# Patient Record
Sex: MALE | Race: WHITE | URBAN - METROPOLITAN AREA
[De-identification: names, ages, dates, MRNs, and addresses within clinical notes are randomized per-mention and may not be internally consistent; named-entity substitution may affect disease eponyms.]

---

## 2017-01-01 ENCOUNTER — MEDICAL CORRESPONDENCE (OUTPATIENT)
Dept: HEALTH INFORMATION MANAGEMENT | Facility: CLINIC | Age: 82
End: 2017-01-01

## 2017-01-01 ENCOUNTER — NURSE TRIAGE (OUTPATIENT)
Dept: NURSING | Facility: CLINIC | Age: 82
End: 2017-01-01

## 2017-01-01 ENCOUNTER — TRANSFERRED RECORDS (OUTPATIENT)
Dept: HEALTH INFORMATION MANAGEMENT | Facility: CLINIC | Age: 82
End: 2017-01-01

## 2017-01-01 ENCOUNTER — TELEPHONE (OUTPATIENT)
Dept: FAMILY MEDICINE | Facility: CLINIC | Age: 82
End: 2017-01-01

## 2017-01-01 DIAGNOSIS — I10 BENIGN ESSENTIAL HYPERTENSION: ICD-10-CM

## 2017-01-01 LAB
ALT SERPL-CCNC: 40 U/L (ref 0–45)
AST SERPL-CCNC: 34 U/L (ref 0–40)
CREAT SERPL-MCNC: 1.92 MG/DL (ref 0.7–1.3)
GFR SERPL CREATININE-BSD FRML MDRD: 33 ML/MIN/1.73M2
GLUCOSE SERPL-MCNC: 136 MG/DL (ref 70–125)
INR PPP: 1.29 (ref 0.9–1.1)
POTASSIUM SERPL-SCNC: 5.7 MMOL/L (ref 3.5–5)

## 2017-01-01 RX ORDER — AMLODIPINE BESYLATE 5 MG/1
TABLET ORAL
Qty: 30 TABLET | Refills: 0 | Status: SHIPPED | OUTPATIENT
Start: 2017-01-01 | End: 2018-01-01

## 2017-01-01 RX ORDER — LISINOPRIL AND HYDROCHLOROTHIAZIDE 20; 25 MG/1; MG/1
TABLET ORAL
Qty: 30 TABLET | Refills: 0 | Status: SHIPPED | OUTPATIENT
Start: 2017-01-01 | End: 2018-01-01

## 2017-02-14 DIAGNOSIS — E78.5 HYPERLIPIDEMIA LDL GOAL <100: ICD-10-CM

## 2017-02-14 RX ORDER — SIMVASTATIN 40 MG
TABLET ORAL
Qty: 90 TABLET | Refills: 3 | Status: SHIPPED | OUTPATIENT
Start: 2017-02-14 | End: 2018-01-01

## 2017-11-25 NOTE — TELEPHONE ENCOUNTER
"Danisha Aguila who identifies herself as Patient's significant other calling to inform patient is in [facility] in [place], [place] with a stoke and receiving \"clot busting drugs\".  Asking if medical providers require any medical information, how they may obtain it.  FNA advised to call clinic and will be directed to FNA.  "

## 2017-11-25 NOTE — TELEPHONE ENCOUNTER
Clinic Action Needed:No  Reason for Call: Isiah Hooker MD with [place] [place] [facility] calling to verify current medication list and inquiring about code status or advance directives.  Advised of current medication list in Epic, and dosages, verified no code status or advance directive on file per last office visit in December of 2016.  Isiah in ER in [place], [place] for care.   Routed to: Not routed.    Norma Chávez RN  [facility]

## 2017-11-28 NOTE — TELEPHONE ENCOUNTER
Daughter Alexandra Petty calling back, Please call Alexandra back at # [phone]  Ok to leave a detailed message    Thank You

## 2017-11-28 NOTE — TELEPHONE ENCOUNTER
"Spent 18 minutes listening to relayed story from significant other of 30 years.  Unable to give information as no Consent.  Pt has had stroke, is in [place], and [place] be discharged [place] a [place]cu in a few days. SO wondering if pt can be transferred to [place], in [place] they are saying pt cannot.  Daughter is decision maker who is in [place], not significant other. Daughter may be calling clinic later today to discuss. Also advised could have current hospital fax update to PCP.    \"We were away for holiday visiting family. [place] [place], I realized that Jayden just had a stroke.  He went to give me a bottle of water, and he couldn't say anything.  I looked and realized a side of his face had dropped.  Then when he went to drink the water it was spilling all over him. I got off of highway, and 3 blocks down fire station.  Immediately called for ambulance, and put him in ambulance. Immediately to [facility].  Was in ER at [facility] on Sunday the 19th  for ASHLEY abiley.  Went out of town Tuesday, and was waking up over night could not catch breath.  Went to  in [facility], was given medication help sleep, thought was panic attack.  Had stroke on Sat Afternoon.  Will stay at hospital through end of week, then rehab in [place].\"    Daughter Alexandra is decision maker.   Advised to call back to clinic if further questions, but arrangements and judgement on if pt can fly should be made by Hospitalist/hospital  in [place].  Diana Castillo RN    "

## 2017-11-28 NOTE — TELEPHONE ENCOUNTER
Reason for Call:  LINCOLN    Detailed comments: Patient's Significant other Mingo (Xuan) Anitha would like to talk to   due to Jayden had a Stroke in [place] on the [place] and is at a Hospital in  [place] right now, she wants to update  on want happened and what is going on    Phone Number Patient can be reached at: [phone] (or) [phone] (Mingo) Significant other    Best Time: anytime    Can we leave a detailed message on this number? YES    Call taken on 11/28/2017 at 8:14 AM by Fidel Ramirez

## 2017-11-28 NOTE — TELEPHONE ENCOUNTER
Did huddle with PCP on this.  If pt is ok to come back to [facility], office should help facilitate facility     However called daughter Alexandra back.  Daughter states everything is under control in [place], that the Hospitalist is handling things and they will be transitioning to TCU in [facility].  Daughter will call if wanting coordination with moving pt back to [place], and will have facility fax discharge paperwork if needed.    No further action needed in clinic at this point.  Diana Castillo RN

## 2017-11-29 NOTE — TELEPHONE ENCOUNTER
Medication is being filled for 1 time refill only due to:  Patient needs to be seen because due for physical and labs December 2017.   Debbie MORFIN RN    Requested Prescriptions   Pending Prescriptions Disp Refills     lisinopril-hydrochlorothiazide (PRINZIDE/ZESTORETIC) 20-25 MG per tablet [Pharmacy Med Name: LISINOPRIL-HCTZ 20-25 MG TAB] 90 tablet 3     Sig: TAKE 1 TABLET BY MOUTH EVERY DAY    Diuretics (Including Combos) Protocol Failed    11/29/2017 11:24 AM       Failed - Blood pressure under 140/90    BP Readings from Last 3 Encounters:   12/06/16 156/72   10/09/15 126/80   08/05/14 136/74                Failed - Normal serum creatinine on file in past 12 months    Recent Labs   Lab Test  12/06/16   0936   CR  2.03*             Passed - Recent or future visit with authorizing provider's specialty    Patient had office visit in the last year or has a visit in the next 30 days with authorizing provider.  See chart review.              Passed - Patient is age 18 or older       Passed - Normal serum potassium on file in past 12 months    Recent Labs   Lab Test  12/06/16   0936   POTASSIUM  4.8                   Passed - Normal serum sodium on file in past 12 months    Recent Labs   Lab Test  12/06/16   0936   NA  142

## 2017-11-29 NOTE — TELEPHONE ENCOUNTER
Left message asking Alexandra to call back to clarify what is needed/requested per below message  Janice NORRIS RN

## 2017-11-29 NOTE — TELEPHONE ENCOUNTER
Pt's daughter Alexandra called back. She had a question about one of pt's medications. It was difficult to hear her because there was a lot of background noise and side conversations between her and someone named Maliha, but what I gathered is that pt is out of one of his medications, she's not sure which one, but she wonders if it is best that Dr. Zaldivar send script to her pharmacy in [place], or if Hospital Doctor should prescribe. Please call Alexandra back for clarification asap. Ph # is [phone]

## 2017-12-01 NOTE — TELEPHONE ENCOUNTER
I reached her and they are going to fax Dr Zaldivar when he is discharged.  Nothing needed now  Faby Elmore RN- Triage [employer]

## 2017-12-08 NOTE — TELEPHONE ENCOUNTER
Reason for Call:  Other call back    Detailed comments: Pt needs rehab and lives in [facility]-- Pt's home care nurse is wondering if Kayley can follow up on pt with fax and phone due to the pt recently having a stroke. Please give pt's home care nurse a call back in regards to this.    Phone Number Patient can be reached at: Other phone number:  [facility]: [facility]-- [phone]    Best Time:     Can we leave a detailed message on this number? YES    Call taken on 12/8/2017 at 10:02 AM by Sarah Villar

## 2017-12-08 NOTE — TELEPHONE ENCOUNTER
Called [facility] back and they got one of the in house doctors to follow Jayden, but they will let him know that he needs an OV soon.   Savana Neal MA

## 2017-12-22 NOTE — TELEPHONE ENCOUNTER
I can not today, could do hosp follow up spot on the 26th.  However, if he is at [facility] then I suspect is seeing nursing home md.  I usually have that person care for person in [facility] and when out then see patient.    Azar Zaldivar M.D.

## 2017-12-22 NOTE — TELEPHONE ENCOUNTER
TO PCP:   Called patient - son Roberto (not on C2C) answered (unable to discuss reason for call)  Roberto states patient recently had a stroke and is currently at [facility] and he wants to bring pt in for OV to see Dr. Zaldivar but he is only in town through 12/26/17 and is asking if pt can either be fit into the schedule this afternoon or sometime on 12/26/17?   Roberto's cell number is: [phone] - he would like a call back     Also RN unable to send Rx as 1) patient overdue for OV/labs 2) last BP elevated 3) last creatinine elevated   Amlodipine 30 day Rx is pended     Janice NORRIS RN            Requested Prescriptions   Pending Prescriptions Disp Refills     amLODIPine (NORVASC) 5 MG tablet [Pharmacy Med Name: AMLODIPINE BESYLATE 5 MG TAB] 90 tablet 3     Sig: TAKE 1 TABLET BY MOUTH EVERY DAY    Calcium Channel Blockers Protocol  Failed    12/21/2017 10:01 AM       Failed - Blood pressure under 140/90    BP Readings from Last 3 Encounters:   12/06/16 156/72   10/09/15 126/80   08/05/14 136/74          Failed - Recent or future visit with authorizing provider    Patient had office visit in the last year or has a visit in the next 30 days with authorizing provider.  See chart review.          Failed - Normal serum creatinine on file in past 12 months    Recent Labs   Lab Test  12/06/16   0936   CR  2.03*            Passed - Patient is age 18 or older        Last OV 12/6/16

## 2017-12-22 NOTE — TELEPHONE ENCOUNTER
Spoke with Roberto - scheduled patient for 12/26/17 with PCP - still wants to see PCP even though currently at [facility]   Pt is receiving medication at [facility] currently - can discuss medication at upcoming OV     30 day Rx is pended   Routing refill request to provider for review/approval because:  Labs not current/out of range:  Last creatinine elevated   Last BP elevated       Janice NORRIS RN

## 2018-01-01 ENCOUNTER — ANTICOAGULATION THERAPY VISIT (OUTPATIENT)
Dept: NURSING | Facility: CLINIC | Age: 83
End: 2018-01-01
Payer: COMMERCIAL

## 2018-01-01 ENCOUNTER — MEDICAL CORRESPONDENCE (OUTPATIENT)
Dept: HEALTH INFORMATION MANAGEMENT | Facility: CLINIC | Age: 83
End: 2018-01-01

## 2018-01-01 ENCOUNTER — NURSE TRIAGE (OUTPATIENT)
Dept: NURSING | Facility: CLINIC | Age: 83
End: 2018-01-01

## 2018-01-01 ENCOUNTER — APPOINTMENT (OUTPATIENT)
Dept: ULTRASOUND IMAGING | Facility: CLINIC | Age: 83
DRG: 091 | End: 2018-01-01
Attending: HOSPITALIST
Payer: MEDICARE

## 2018-01-01 ENCOUNTER — APPOINTMENT (OUTPATIENT)
Dept: GENERAL RADIOLOGY | Facility: CLINIC | Age: 83
DRG: 091 | End: 2018-01-01
Attending: NURSE PRACTITIONER
Payer: MEDICARE

## 2018-01-01 ENCOUNTER — TELEPHONE (OUTPATIENT)
Dept: FAMILY MEDICINE | Facility: CLINIC | Age: 83
End: 2018-01-01

## 2018-01-01 ENCOUNTER — TRANSFERRED RECORDS (OUTPATIENT)
Dept: HEALTH INFORMATION MANAGEMENT | Facility: CLINIC | Age: 83
End: 2018-01-01

## 2018-01-01 ENCOUNTER — DOCUMENTATION ONLY (OUTPATIENT)
Dept: CARDIOLOGY | Facility: CLINIC | Age: 83
End: 2018-01-01

## 2018-01-01 ENCOUNTER — RECORDS - HEALTHEAST (OUTPATIENT)
Dept: LAB | Facility: CLINIC | Age: 83
End: 2018-01-01

## 2018-01-01 ENCOUNTER — DOCUMENTATION ONLY (OUTPATIENT)
Dept: SLEEP MEDICINE | Facility: CLINIC | Age: 83
End: 2018-01-01
Payer: COMMERCIAL

## 2018-01-01 ENCOUNTER — APPOINTMENT (OUTPATIENT)
Dept: NUCLEAR MEDICINE | Facility: CLINIC | Age: 83
DRG: 091 | End: 2018-01-01
Attending: INTERNAL MEDICINE
Payer: MEDICARE

## 2018-01-01 ENCOUNTER — DOCUMENTATION ONLY (OUTPATIENT)
Dept: FAMILY MEDICINE | Facility: CLINIC | Age: 83
End: 2018-01-01

## 2018-01-01 ENCOUNTER — OFFICE VISIT (OUTPATIENT)
Dept: FAMILY MEDICINE | Facility: CLINIC | Age: 83
End: 2018-01-01
Payer: COMMERCIAL

## 2018-01-01 ENCOUNTER — TELEPHONE (OUTPATIENT)
Dept: CARDIOLOGY | Facility: CLINIC | Age: 83
End: 2018-01-01

## 2018-01-01 ENCOUNTER — ANTICOAGULATION THERAPY VISIT (OUTPATIENT)
Dept: FAMILY MEDICINE | Facility: CLINIC | Age: 83
End: 2018-01-01
Payer: COMMERCIAL

## 2018-01-01 ENCOUNTER — HOSPITAL ENCOUNTER (INPATIENT)
Facility: CLINIC | Age: 83
LOS: 5 days | Discharge: NURSING, REHAB OR LONG-TERM CARE | DRG: 091 | End: 2018-06-18
Attending: INTERNAL MEDICINE | Admitting: INTERNAL MEDICINE
Payer: MEDICARE

## 2018-01-01 ENCOUNTER — ANTICOAGULATION THERAPY VISIT (OUTPATIENT)
Dept: FAMILY MEDICINE | Facility: CLINIC | Age: 83
End: 2018-01-01

## 2018-01-01 ENCOUNTER — APPOINTMENT (OUTPATIENT)
Dept: PHYSICAL THERAPY | Facility: CLINIC | Age: 83
DRG: 091 | End: 2018-01-01
Attending: PHYSICIAN ASSISTANT
Payer: MEDICARE

## 2018-01-01 ENCOUNTER — APPOINTMENT (OUTPATIENT)
Dept: CARDIOLOGY | Facility: CLINIC | Age: 83
DRG: 091 | End: 2018-01-01
Attending: PHYSICIAN ASSISTANT
Payer: MEDICARE

## 2018-01-01 ENCOUNTER — OFFICE VISIT (OUTPATIENT)
Dept: SLEEP MEDICINE | Facility: CLINIC | Age: 83
End: 2018-01-01
Payer: COMMERCIAL

## 2018-01-01 ENCOUNTER — APPOINTMENT (OUTPATIENT)
Dept: MRI IMAGING | Facility: CLINIC | Age: 83
DRG: 091 | End: 2018-01-01
Attending: NURSE PRACTITIONER
Payer: MEDICARE

## 2018-01-01 ENCOUNTER — HOSPITAL ENCOUNTER (OUTPATIENT)
Dept: CARDIOLOGY | Facility: CLINIC | Age: 83
Discharge: HOME | End: 2018-02-06
Attending: INTERNAL MEDICINE | Admitting: INTERNAL MEDICINE
Payer: MEDICARE

## 2018-01-01 ENCOUNTER — HOSPITAL ENCOUNTER (EMERGENCY)
Facility: CLINIC | Age: 83
Discharge: HOME | End: 2018-06-03
Attending: EMERGENCY MEDICINE | Admitting: EMERGENCY MEDICINE
Payer: MEDICARE

## 2018-01-01 ENCOUNTER — APPOINTMENT (OUTPATIENT)
Dept: PHYSICAL THERAPY | Facility: CLINIC | Age: 83
DRG: 091 | End: 2018-01-01
Attending: INTERNAL MEDICINE
Payer: MEDICARE

## 2018-01-01 ENCOUNTER — TELEPHONE (OUTPATIENT)
Dept: SLEEP MEDICINE | Facility: CLINIC | Age: 83
End: 2018-01-01

## 2018-01-01 ENCOUNTER — APPOINTMENT (OUTPATIENT)
Dept: CT IMAGING | Facility: CLINIC | Age: 83
DRG: 091 | End: 2018-01-01
Attending: PHYSICIAN ASSISTANT
Payer: MEDICARE

## 2018-01-01 ENCOUNTER — HOSPITAL ENCOUNTER (OUTPATIENT)
Dept: CT IMAGING | Facility: CLINIC | Age: 83
Discharge: HOME | End: 2018-05-18
Attending: NURSE PRACTITIONER | Admitting: NURSE PRACTITIONER
Payer: MEDICARE

## 2018-01-01 ENCOUNTER — ANTICOAGULATION THERAPY VISIT (OUTPATIENT)
Dept: NURSING | Facility: CLINIC | Age: 83
End: 2018-01-01

## 2018-01-01 ENCOUNTER — APPOINTMENT (OUTPATIENT)
Dept: CT IMAGING | Facility: CLINIC | Age: 83
DRG: 091 | End: 2018-01-01
Attending: NURSE PRACTITIONER
Payer: MEDICARE

## 2018-01-01 ENCOUNTER — APPOINTMENT (OUTPATIENT)
Dept: CT IMAGING | Facility: CLINIC | Age: 83
End: 2018-01-01
Attending: EMERGENCY MEDICINE
Payer: MEDICARE

## 2018-01-01 ENCOUNTER — OFFICE VISIT (OUTPATIENT)
Dept: CARDIOLOGY | Facility: CLINIC | Age: 83
End: 2018-01-01
Payer: COMMERCIAL

## 2018-01-01 VITALS
WEIGHT: 125.5 LBS | TEMPERATURE: 97.6 F | OXYGEN SATURATION: 95 % | BODY MASS INDEX: 18.59 KG/M2 | HEART RATE: 69 BPM | DIASTOLIC BLOOD PRESSURE: 68 MMHG | HEIGHT: 69 IN | SYSTOLIC BLOOD PRESSURE: 128 MMHG

## 2018-01-01 VITALS
HEIGHT: 69 IN | TEMPERATURE: 97.6 F | DIASTOLIC BLOOD PRESSURE: 86 MMHG | OXYGEN SATURATION: 100 % | SYSTOLIC BLOOD PRESSURE: 160 MMHG | HEART RATE: 81 BPM

## 2018-01-01 VITALS
BODY MASS INDEX: 18.51 KG/M2 | WEIGHT: 125 LBS | HEIGHT: 69 IN | SYSTOLIC BLOOD PRESSURE: 134 MMHG | DIASTOLIC BLOOD PRESSURE: 68 MMHG | HEART RATE: 61 BPM | OXYGEN SATURATION: 92 %

## 2018-01-01 VITALS
DIASTOLIC BLOOD PRESSURE: 76 MMHG | SYSTOLIC BLOOD PRESSURE: 118 MMHG | HEIGHT: 69 IN | TEMPERATURE: 97.4 F | BODY MASS INDEX: 22.07 KG/M2 | OXYGEN SATURATION: 94 % | WEIGHT: 149 LBS | HEART RATE: 96 BPM

## 2018-01-01 VITALS
WEIGHT: 122 LBS | BODY MASS INDEX: 18.07 KG/M2 | HEIGHT: 69 IN | TEMPERATURE: 97.8 F | SYSTOLIC BLOOD PRESSURE: 108 MMHG | HEART RATE: 65 BPM | DIASTOLIC BLOOD PRESSURE: 62 MMHG | OXYGEN SATURATION: 95 %

## 2018-01-01 VITALS
DIASTOLIC BLOOD PRESSURE: 65 MMHG | HEIGHT: 69 IN | TEMPERATURE: 97.4 F | HEART RATE: 68 BPM | BODY MASS INDEX: 18.96 KG/M2 | SYSTOLIC BLOOD PRESSURE: 126 MMHG | WEIGHT: 128 LBS | OXYGEN SATURATION: 100 %

## 2018-01-01 VITALS
HEART RATE: 56 BPM | HEIGHT: 69 IN | BODY MASS INDEX: 18.51 KG/M2 | RESPIRATION RATE: 16 BRPM | WEIGHT: 125 LBS | SYSTOLIC BLOOD PRESSURE: 136 MMHG | DIASTOLIC BLOOD PRESSURE: 62 MMHG | OXYGEN SATURATION: 95 %

## 2018-01-01 VITALS
BODY MASS INDEX: 18.81 KG/M2 | TEMPERATURE: 97.7 F | RESPIRATION RATE: 20 BRPM | OXYGEN SATURATION: 97 % | SYSTOLIC BLOOD PRESSURE: 146 MMHG | HEIGHT: 69 IN | WEIGHT: 127 LBS | DIASTOLIC BLOOD PRESSURE: 86 MMHG

## 2018-01-01 VITALS
DIASTOLIC BLOOD PRESSURE: 76 MMHG | HEART RATE: 64 BPM | BODY MASS INDEX: 17.96 KG/M2 | TEMPERATURE: 97.3 F | OXYGEN SATURATION: 93 % | WEIGHT: 121.6 LBS | SYSTOLIC BLOOD PRESSURE: 154 MMHG | RESPIRATION RATE: 16 BRPM

## 2018-01-01 VITALS
OXYGEN SATURATION: 100 % | HEART RATE: 62 BPM | HEIGHT: 69 IN | WEIGHT: 127.9 LBS | DIASTOLIC BLOOD PRESSURE: 63 MMHG | SYSTOLIC BLOOD PRESSURE: 127 MMHG | BODY MASS INDEX: 18.94 KG/M2 | TEMPERATURE: 96.5 F

## 2018-01-01 VITALS
BODY MASS INDEX: 18.51 KG/M2 | HEIGHT: 69 IN | WEIGHT: 125 LBS | SYSTOLIC BLOOD PRESSURE: 129 MMHG | HEART RATE: 62 BPM | DIASTOLIC BLOOD PRESSURE: 64 MMHG | RESPIRATION RATE: 16 BRPM | OXYGEN SATURATION: 92 %

## 2018-01-01 VITALS
HEIGHT: 69 IN | WEIGHT: 133 LBS | HEART RATE: 81 BPM | BODY MASS INDEX: 19.7 KG/M2 | TEMPERATURE: 97.9 F | OXYGEN SATURATION: 100 % | SYSTOLIC BLOOD PRESSURE: 126 MMHG | DIASTOLIC BLOOD PRESSURE: 58 MMHG

## 2018-01-01 DIAGNOSIS — I63.419 CEREBROVASCULAR ACCIDENT (CVA) DUE TO EMBOLISM OF MIDDLE CEREBRAL ARTERY, UNSPECIFIED BLOOD VESSEL LATERALITY (H): ICD-10-CM

## 2018-01-01 DIAGNOSIS — R29.6 RECURRENT FALLS: Primary | ICD-10-CM

## 2018-01-01 DIAGNOSIS — I73.9 PVD (PERIPHERAL VASCULAR DISEASE) WITH CLAUDICATION (H): ICD-10-CM

## 2018-01-01 DIAGNOSIS — Z79.01 LONG-TERM (CURRENT) USE OF ANTICOAGULANTS: ICD-10-CM

## 2018-01-01 DIAGNOSIS — I25.10 ASCVD (ARTERIOSCLEROTIC CARDIOVASCULAR DISEASE): ICD-10-CM

## 2018-01-01 DIAGNOSIS — I48.0 INTERMITTENT ATRIAL FIBRILLATION (H): Primary | ICD-10-CM

## 2018-01-01 DIAGNOSIS — N18.4 CKD (CHRONIC KIDNEY DISEASE) STAGE 4, GFR 15-29 ML/MIN (H): Primary | ICD-10-CM

## 2018-01-01 DIAGNOSIS — I10 BENIGN ESSENTIAL HYPERTENSION: ICD-10-CM

## 2018-01-01 DIAGNOSIS — I42.0 DILATED CARDIOMYOPATHY (H): ICD-10-CM

## 2018-01-01 DIAGNOSIS — I63.9 CEREBROVASCULAR ACCIDENT (CVA), UNSPECIFIED MECHANISM (H): ICD-10-CM

## 2018-01-01 DIAGNOSIS — Z87.820 SIGNIFICANT CLOSED HEAD TRAUMA WITHIN PAST 3 MONTHS: ICD-10-CM

## 2018-01-01 DIAGNOSIS — N18.4 CKD (CHRONIC KIDNEY DISEASE) STAGE 4, GFR 15-29 ML/MIN (H): ICD-10-CM

## 2018-01-01 DIAGNOSIS — Z71.89 ADVANCED DIRECTIVES, COUNSELING/DISCUSSION: Primary | ICD-10-CM

## 2018-01-01 DIAGNOSIS — I63.9 CEREBROVASCULAR ACCIDENT (CVA), UNSPECIFIED MECHANISM (H): Primary | ICD-10-CM

## 2018-01-01 DIAGNOSIS — I48.0 INTERMITTENT ATRIAL FIBRILLATION (H): ICD-10-CM

## 2018-01-01 DIAGNOSIS — I25.5 ISCHEMIC CARDIOMYOPATHY: ICD-10-CM

## 2018-01-01 DIAGNOSIS — R53.83 OTHER FATIGUE: ICD-10-CM

## 2018-01-01 DIAGNOSIS — R29.6 FALLS FREQUENTLY: Primary | ICD-10-CM

## 2018-01-01 DIAGNOSIS — F41.9 ANXIETY: ICD-10-CM

## 2018-01-01 DIAGNOSIS — G47.20 CIRCADIAN RHYTHM SLEEP DISORDER: Primary | ICD-10-CM

## 2018-01-01 DIAGNOSIS — G47.9 DIFFICULTY SLEEPING: ICD-10-CM

## 2018-01-01 DIAGNOSIS — R60.0 BILATERAL LEG EDEMA: ICD-10-CM

## 2018-01-01 DIAGNOSIS — S09.90XA ACUTE HEAD TRAUMA, INITIAL ENCOUNTER: Primary | ICD-10-CM

## 2018-01-01 DIAGNOSIS — I42.9 CARDIOMYOPATHY, UNSPECIFIED TYPE (H): ICD-10-CM

## 2018-01-01 DIAGNOSIS — R55 SYNCOPE, UNSPECIFIED SYNCOPE TYPE: Primary | ICD-10-CM

## 2018-01-01 DIAGNOSIS — R06.3 CHEYNE-STOKES BREATHING DISORDER: ICD-10-CM

## 2018-01-01 DIAGNOSIS — G47.419 NARCOLEPSY WITHOUT CATAPLEXY(347.00): ICD-10-CM

## 2018-01-01 DIAGNOSIS — S09.90XA ACUTE HEAD TRAUMA, INITIAL ENCOUNTER: ICD-10-CM

## 2018-01-01 DIAGNOSIS — R63.4 LOSS OF WEIGHT: Primary | ICD-10-CM

## 2018-01-01 DIAGNOSIS — G47.19 EXCESSIVE DAYTIME SLEEPINESS: ICD-10-CM

## 2018-01-01 DIAGNOSIS — E78.5 HYPERLIPIDEMIA LDL GOAL <100: ICD-10-CM

## 2018-01-01 DIAGNOSIS — R74.8 ELEVATED LIVER ENZYMES: ICD-10-CM

## 2018-01-01 DIAGNOSIS — F51.04 CHRONIC INSOMNIA: ICD-10-CM

## 2018-01-01 DIAGNOSIS — L02.436: ICD-10-CM

## 2018-01-01 DIAGNOSIS — R55 SYNCOPE, UNSPECIFIED SYNCOPE TYPE: ICD-10-CM
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
## 2018-01-01 DIAGNOSIS — E46 PROTEIN-CALORIE MALNUTRITION, UNSPECIFIED SEVERITY (H): ICD-10-CM

## 2018-01-01 DIAGNOSIS — Z79.01 CURRENT USE OF LONG TERM ANTICOAGULATION: ICD-10-CM

## 2018-01-01 DIAGNOSIS — I48.21 PERMANENT ATRIAL FIBRILLATION (H): ICD-10-CM

## 2018-01-01 DIAGNOSIS — I25.10 CORONARY ARTERY DISEASE INVOLVING NATIVE CORONARY ARTERY OF NATIVE HEART WITHOUT ANGINA PECTORIS: ICD-10-CM

## 2018-01-01 DIAGNOSIS — R41.89 COGNITIVE IMPAIRMENT: ICD-10-CM

## 2018-01-01 DIAGNOSIS — I63.419 CEREBROVASCULAR ACCIDENT (CVA) DUE TO EMBOLISM OF MIDDLE CEREBRAL ARTERY, UNSPECIFIED BLOOD VESSEL LATERALITY (H): Primary | ICD-10-CM

## 2018-01-01 DIAGNOSIS — G47.31 CENTRAL SLEEP APNEA: Primary | ICD-10-CM

## 2018-01-01 DIAGNOSIS — S00.03XA CONTUSION OF SCALP, INITIAL ENCOUNTER: ICD-10-CM

## 2018-01-01 DIAGNOSIS — I10 BENIGN ESSENTIAL HYPERTENSION: Primary | ICD-10-CM

## 2018-01-01 LAB
ALBUMIN SERPL-MCNC: 2.4 G/DL (ref 3.4–5)
ALBUMIN UR-MCNC: 100 MG/DL
ALP SERPL-CCNC: 227 U/L (ref 40–150)
ALT SERPL W P-5'-P-CCNC: 25 U/L (ref 0–70)
ANION GAP SERPL CALCULATED.3IONS-SCNC: 10 MMOL/L (ref 3–14)
ANION GAP SERPL CALCULATED.3IONS-SCNC: 5 MMOL/L (ref 3–14)
ANION GAP SERPL CALCULATED.3IONS-SCNC: 7 MMOL/L (ref 3–14)
ANION GAP SERPL CALCULATED.3IONS-SCNC: 8 MMOL/L (ref 5–18)
ANION GAP SERPL CALCULATED.3IONS-SCNC: 8 MMOL/L (ref 5–18)
ANION GAP SERPL CALCULATED.3IONS-SCNC: 9 MMOL/L (ref 3–14)
APPEARANCE UR: CLEAR
AST SERPL W P-5'-P-CCNC: 43 U/L (ref 0–45)
BACTERIA SPEC CULT: NORMAL
BACTERIA SPEC CULT: NORMAL
BASOPHILS # BLD AUTO: 0 10E9/L (ref 0–0.2)
BASOPHILS # BLD AUTO: 0 10E9/L (ref 0–0.2)
BASOPHILS # BLD AUTO: 0 THOU/UL (ref 0–0.2)
BASOPHILS NFR BLD AUTO: 0 % (ref 0–2)
BASOPHILS NFR BLD AUTO: 0.2 %
BASOPHILS NFR BLD AUTO: 0.2 %
BILIRUB DIRECT SERPL-MCNC: 0.1 MG/DL (ref 0–0.2)
BILIRUB SERPL-MCNC: 0.7 MG/DL (ref 0.2–1.3)
BILIRUB UR QL STRIP: NEGATIVE
BUN SERPL-MCNC: 43 MG/DL (ref 7–30)
BUN SERPL-MCNC: 43 MG/DL (ref 8–28)
BUN SERPL-MCNC: 47 MG/DL (ref 7–30)
BUN SERPL-MCNC: 47 MG/DL (ref 8–28)
BUN SERPL-MCNC: 48 MG/DL (ref 7–30)
BUN SERPL-MCNC: 49 MG/DL (ref 7–30)
BUN SERPL-MCNC: 55 MG/DL (ref 7–30)
BUN SERPL-MCNC: 64 MG/DL (ref 7–30)
CALCIUM SERPL-MCNC: 8.7 MG/DL (ref 8.5–10.1)
CALCIUM SERPL-MCNC: 9 MG/DL (ref 8.5–10.5)
CALCIUM SERPL-MCNC: 9.1 MG/DL (ref 8.5–10.1)
CALCIUM SERPL-MCNC: 9.2 MG/DL (ref 8.5–10.1)
CALCIUM SERPL-MCNC: 9.3 MG/DL (ref 8.5–10.1)
CALCIUM SERPL-MCNC: 9.4 MG/DL (ref 8.5–10.1)
CALCIUM SERPL-MCNC: 9.4 MG/DL (ref 8.5–10.5)
CALCIUM SERPL-MCNC: 9.6 MG/DL (ref 8.5–10.1)
CHLORIDE BLD-SCNC: 106 MMOL/L (ref 98–107)
CHLORIDE BLD-SCNC: 109 MMOL/L (ref 98–107)
CHLORIDE SERPL-SCNC: 103 MMOL/L (ref 94–109)
CHLORIDE SERPL-SCNC: 106 MMOL/L (ref 94–109)
CHLORIDE SERPL-SCNC: 107 MMOL/L (ref 94–109)
CHLORIDE SERPL-SCNC: 108 MMOL/L (ref 94–109)
CHLORIDE SERPL-SCNC: 108 MMOL/L (ref 94–109)
CHLORIDE SERPL-SCNC: 109 MMOL/L (ref 94–109)
CO2 SERPL-SCNC: 22 MMOL/L (ref 20–32)
CO2 SERPL-SCNC: 26 MMOL/L (ref 20–32)
CO2 SERPL-SCNC: 26 MMOL/L (ref 20–32)
CO2 SERPL-SCNC: 27 MMOL/L (ref 22–31)
CO2 SERPL-SCNC: 28 MMOL/L (ref 20–32)
CO2 SERPL-SCNC: 28 MMOL/L (ref 22–31)
COLOR UR AUTO: YELLOW
CREAT SERPL-MCNC: 1.95 MG/DL (ref 0.66–1.25)
CREAT SERPL-MCNC: 2.05 MG/DL (ref 0.7–1.3)
CREAT SERPL-MCNC: 2.06 MG/DL (ref 0.7–1.3)
CREAT SERPL-MCNC: 2.06 MG/DL (ref 0.7–1.3)
CREAT SERPL-MCNC: 2.09 MG/DL (ref 0.66–1.25)
CREAT SERPL-MCNC: 2.11 MG/DL (ref 0.66–1.25)
CREAT SERPL-MCNC: 2.12 MG/DL (ref 0.66–1.25)
CREAT SERPL-MCNC: 2.23 MG/DL (ref 0.66–1.25)
CREAT SERPL-MCNC: 2.35 MG/DL (ref 0.66–1.25)
CREAT SERPL-MCNC: 2.36 MG/DL (ref 0.7–1.3)
DIFFERENTIAL METHOD BLD: ABNORMAL
DIFFERENTIAL METHOD BLD: ABNORMAL
EOSINOPHIL # BLD AUTO: 0.1 10E9/L (ref 0–0.7)
EOSINOPHIL # BLD AUTO: 0.1 THOU/UL (ref 0–0.4)
EOSINOPHIL # BLD AUTO: 0.2 10E9/L (ref 0–0.7)
EOSINOPHIL NFR BLD AUTO: 1 %
EOSINOPHIL NFR BLD AUTO: 1 % (ref 0–6)
EOSINOPHIL NFR BLD AUTO: 2.7 %
ERYTHROCYTE [DISTWIDTH] IN BLOOD BY AUTOMATED COUNT: 14.1 % (ref 11–14.5)
ERYTHROCYTE [DISTWIDTH] IN BLOOD BY AUTOMATED COUNT: 15.2 % (ref 10–15)
ERYTHROCYTE [DISTWIDTH] IN BLOOD BY AUTOMATED COUNT: 15.6 % (ref 10–15)
ERYTHROCYTE [DISTWIDTH] IN BLOOD BY AUTOMATED COUNT: 16 % (ref 10–15)
ERYTHROCYTE [DISTWIDTH] IN BLOOD BY AUTOMATED COUNT: 16 % (ref 10–15)
ERYTHROCYTE [DISTWIDTH] IN BLOOD BY AUTOMATED COUNT: 16.3 % (ref 10–15)
FERRITIN SERPL-MCNC: 73 NG/ML (ref 26–388)
FLUAV AG SPEC QL IA: NORMAL
FLUBV AG SPEC QL IA: NORMAL
GFR SERPL CREATININE-BSD FRML MDRD: 26 ML/MIN/1.73M2
GFR SERPL CREATININE-BSD FRML MDRD: 26 ML/MIN/1.7M2
GFR SERPL CREATININE-BSD FRML MDRD: 27 ML/MIN/1.73M2
GFR SERPL CREATININE-BSD FRML MDRD: 28 ML/MIN/1.7M2
GFR SERPL CREATININE-BSD FRML MDRD: 30 ML/MIN/1.7M2
GFR SERPL CREATININE-BSD FRML MDRD: 31 ML/MIN/1.73M2
GFR SERPL CREATININE-BSD FRML MDRD: 31 ML/MIN/1.73M2
GFR SERPL CREATININE-BSD FRML MDRD: 33 ML/MIN/1.7M2
GLUCOSE BLD-MCNC: 130 MG/DL (ref 70–125)
GLUCOSE BLD-MCNC: 89 MG/DL (ref 70–125)
GLUCOSE SERPL-MCNC: 119 MG/DL (ref 70–99)
GLUCOSE SERPL-MCNC: 130 MG/DL (ref 70–125)
GLUCOSE SERPL-MCNC: 78 MG/DL (ref 70–99)
GLUCOSE SERPL-MCNC: 85 MG/DL (ref 70–99)
GLUCOSE SERPL-MCNC: 92 MG/DL (ref 70–99)
GLUCOSE SERPL-MCNC: 94 MG/DL (ref 70–99)
GLUCOSE SERPL-MCNC: 96 MG/DL (ref 70–99)
GLUCOSE SERPL-MCNC: 97 MG/DL (ref 74–106)
GLUCOSE UR STRIP-MCNC: NEGATIVE MG/DL
HCT VFR BLD AUTO: 32.8 % (ref 40–53)
HCT VFR BLD AUTO: 33.8 % (ref 40–53)
HCT VFR BLD AUTO: 33.9 % (ref 40–53)
HCT VFR BLD AUTO: 33.9 % (ref 40–54)
HCT VFR BLD AUTO: 35.7 % (ref 40–53)
HCT VFR BLD AUTO: 35.8 % (ref 40–53)
HGB BLD-MCNC: 10.4 G/DL (ref 14–18)
HGB BLD-MCNC: 10.7 G/DL (ref 13.3–17.7)
HGB BLD-MCNC: 10.9 G/DL (ref 13.3–17.7)
HGB BLD-MCNC: 11.1 G/DL (ref 13.3–17.7)
HGB BLD-MCNC: 11.1 G/DL (ref 13.3–17.7)
HGB BLD-MCNC: 11.7 G/DL (ref 13.3–17.7)
HGB UR QL STRIP: NEGATIVE
HYALINE CASTS #/AREA URNS LPF: 4 /LPF (ref 0–2)
IMM GRANULOCYTES # BLD: 0 10E9/L (ref 0–0.4)
IMM GRANULOCYTES # BLD: 0 10E9/L (ref 0–0.4)
IMM GRANULOCYTES NFR BLD: 0.2 %
IMM GRANULOCYTES NFR BLD: 0.2 %
INR POINT OF CARE: 1.6 (ref 0.86–1.14)
INR POINT OF CARE: 1.8 (ref 0.86–1.14)
INR POINT OF CARE: 2.2 (ref 0.86–1.14)
INR POINT OF CARE: 2.5 (ref 0.86–1.14)
INR POINT OF CARE: 2.8 (ref 0.86–1.14)
INR POINT OF CARE: 3.6 (ref 0.86–1.14)
INR POINT OF CARE: 3.7 (ref 0.86–1.14)
INR PPP: 1.2 (ref 0.86–1.14)
INR PPP: 1.2 (ref 1–1.2)
INR PPP: 1.4
INR PPP: 1.49 (ref 0.86–1.14)
INR PPP: 1.7 (ref 0.86–1.14)
INR PPP: 2 (ref 0.86–1.14)
INR PPP: 2.36 (ref 0.9–1.1)
INR PPP: 2.36 (ref 0.9–1.1)
INR PPP: 2.4
INR PPP: 2.6
INR PPP: 2.8
INR PPP: 2.9
INR PPP: 2.93 (ref 0.9–1.1)
INR PPP: 2.93 (ref 0.9–1.1)
INR PPP: 3.6
INR PPP: 3.9
INR PPP: 5.2
INTERPRETATION ECG - MUSE: NORMAL
IRON SATN MFR SERPL: 15 % (ref 15–46)
IRON SERPL-MCNC: 41 UG/DL (ref 35–180)
KETONES UR STRIP-MCNC: NEGATIVE MG/DL
LEUKOCYTE ESTERASE UR QL STRIP: ABNORMAL
LYMPHOCYTES # BLD AUTO: 0.7 THOU/UL (ref 0.8–4.4)
LYMPHOCYTES # BLD AUTO: 0.9 10E9/L (ref 0.8–5.3)
LYMPHOCYTES # BLD AUTO: 0.9 10E9/L (ref 0.8–5.3)
LYMPHOCYTES NFR BLD AUTO: 10 % (ref 20–40)
LYMPHOCYTES NFR BLD AUTO: 14.8 %
LYMPHOCYTES NFR BLD AUTO: 16.3 %
Lab: NORMAL
MCH RBC QN AUTO: 30.1 PG (ref 26.5–33)
MCH RBC QN AUTO: 30.1 PG (ref 27–34)
MCH RBC QN AUTO: 30.6 PG (ref 26.5–33)
MCH RBC QN AUTO: 30.6 PG (ref 26.5–33)
MCH RBC QN AUTO: 30.7 PG (ref 26.5–33)
MCH RBC QN AUTO: 31 PG (ref 26.5–33)
MCHC RBC AUTO-ENTMCNC: 30.7 G/DL (ref 32–36)
MCHC RBC AUTO-ENTMCNC: 31 G/DL (ref 31.5–36.5)
MCHC RBC AUTO-ENTMCNC: 32.2 G/DL (ref 31.5–36.5)
MCHC RBC AUTO-ENTMCNC: 32.6 G/DL (ref 31.5–36.5)
MCHC RBC AUTO-ENTMCNC: 32.7 G/DL (ref 31.5–36.5)
MCHC RBC AUTO-ENTMCNC: 32.8 G/DL (ref 31.5–36.5)
MCV RBC AUTO: 93 FL (ref 78–100)
MCV RBC AUTO: 94 FL (ref 78–100)
MCV RBC AUTO: 94 FL (ref 78–100)
MCV RBC AUTO: 95 FL (ref 78–100)
MCV RBC AUTO: 97 FL (ref 78–100)
MCV RBC AUTO: 98 FL (ref 80–100)
MONOCYTES # BLD AUTO: 0.4 10E9/L (ref 0–1.3)
MONOCYTES # BLD AUTO: 0.5 10E9/L (ref 0–1.3)
MONOCYTES # BLD AUTO: 0.6 THOU/UL (ref 0–0.9)
MONOCYTES NFR BLD AUTO: 10 % (ref 2–10)
MONOCYTES NFR BLD AUTO: 5.9 %
MONOCYTES NFR BLD AUTO: 9.6 %
NEUTROPHILS # BLD AUTO: 3.9 10E9/L (ref 1.6–8.3)
NEUTROPHILS # BLD AUTO: 4.8 10E9/L (ref 1.6–8.3)
NEUTROPHILS # BLD AUTO: 5.2 THOU/UL (ref 2–7.7)
NEUTROPHILS NFR BLD AUTO: 71 %
NEUTROPHILS NFR BLD AUTO: 77.9 %
NEUTROPHILS NFR BLD AUTO: 79 % (ref 50–70)
NITRATE UR QL: NEGATIVE
NRBC # BLD AUTO: 0 10*3/UL
NRBC # BLD AUTO: 0 10*3/UL
NRBC BLD AUTO-RTO: 0 /100
NRBC BLD AUTO-RTO: 0 /100
PH UR STRIP: 6 PH (ref 5–7)
PLATELET # BLD AUTO: 219 10E9/L (ref 150–450)
PLATELET # BLD AUTO: 229 10E9/L (ref 150–450)
PLATELET # BLD AUTO: 235 10E9/L (ref 150–450)
PLATELET # BLD AUTO: 249 10E9/L (ref 150–450)
PLATELET # BLD AUTO: 256 10E9/L (ref 150–450)
PLATELET # BLD AUTO: 287 THOU/UL (ref 140–440)
PMV BLD AUTO: 10.7 FL (ref 8.5–12.5)
POTASSIUM BLD-SCNC: 4.7 MMOL/L (ref 3.5–5)
POTASSIUM BLD-SCNC: 5.1 MMOL/L (ref 3.5–5)
POTASSIUM SERPL-SCNC: 4.3 MMOL/L (ref 3.4–5.3)
POTASSIUM SERPL-SCNC: 4.4 MMOL/L (ref 3.4–5.3)
POTASSIUM SERPL-SCNC: 4.6 MMOL/L (ref 3.4–5.3)
POTASSIUM SERPL-SCNC: 4.7 MMOL/L (ref 3.4–5.3)
POTASSIUM SERPL-SCNC: 4.8 MMOL/L (ref 3.5–5.1)
POTASSIUM SERPL-SCNC: 4.9 MMOL/L (ref 3.4–5.3)
POTASSIUM SERPL-SCNC: 5.1 MMOL/L (ref 3.5–5)
POTASSIUM SERPL-SCNC: 5.2 MMOL/L (ref 3.4–5.3)
PROT SERPL-MCNC: 6.9 G/DL (ref 6.8–8.8)
RBC # BLD AUTO: 3.45 MILL/UL (ref 4.4–6.2)
RBC # BLD AUTO: 3.5 10E12/L (ref 4.4–5.9)
RBC # BLD AUTO: 3.55 10E12/L (ref 4.4–5.9)
RBC # BLD AUTO: 3.63 10E12/L (ref 4.4–5.9)
RBC # BLD AUTO: 3.69 10E12/L (ref 4.4–5.9)
RBC # BLD AUTO: 3.78 10E12/L (ref 4.4–5.9)
RBC #/AREA URNS AUTO: <1 /HPF (ref 0–2)
SODIUM SERPL-SCNC: 138 MMOL/L (ref 133–144)
SODIUM SERPL-SCNC: 140 MMOL/L (ref 133–144)
SODIUM SERPL-SCNC: 141 MMOL/L (ref 133–144)
SODIUM SERPL-SCNC: 141 MMOL/L (ref 133–144)
SODIUM SERPL-SCNC: 141 MMOL/L (ref 136–145)
SODIUM SERPL-SCNC: 142 MMOL/L (ref 133–144)
SODIUM SERPL-SCNC: 142 MMOL/L (ref 133–144)
SODIUM SERPL-SCNC: 145 MMOL/L (ref 136–145)
SOURCE: ABNORMAL
SP GR UR STRIP: 1.01 (ref 1–1.03)
SPECIMEN SOURCE: NORMAL
TIBC SERPL-MCNC: 275 UG/DL (ref 240–430)
TSH SERPL DL<=0.005 MIU/L-ACNC: 2.17 MU/L (ref 0.4–4)
TSH SERPL DL<=0.005 MIU/L-ACNC: 2.42 MU/L (ref 0.4–4)
UROBILINOGEN UR STRIP-MCNC: NORMAL MG/DL (ref 0–2)
WBC # BLD AUTO: 5.5 10E9/L (ref 4–11)
WBC # BLD AUTO: 5.7 10E9/L (ref 4–11)
WBC # BLD AUTO: 6.1 10E9/L (ref 4–11)
WBC # BLD AUTO: 6.3 10E9/L (ref 4–11)
WBC # BLD AUTO: 6.8 10E9/L (ref 4–11)
WBC #/AREA URNS AUTO: 12 /HPF (ref 0–5)
WBC: 6.6 THOU/UL (ref 4–11)

## 2018-01-01 PROCEDURE — 25000128 H RX IP 250 OP 636: Performed by: INTERNAL MEDICINE

## 2018-01-01 PROCEDURE — 93970 EXTREMITY STUDY: CPT

## 2018-01-01 PROCEDURE — 40000193 ZZH STATISTIC PT WARD VISIT

## 2018-01-01 PROCEDURE — 99232 SBSQ HOSP IP/OBS MODERATE 35: CPT | Performed by: INTERNAL MEDICINE

## 2018-01-01 PROCEDURE — 25000132 ZZH RX MED GY IP 250 OP 250 PS 637: Mod: GY | Performed by: INTERNAL MEDICINE

## 2018-01-01 PROCEDURE — 85027 COMPLETE CBC AUTOMATED: CPT | Performed by: INTERNAL MEDICINE

## 2018-01-01 PROCEDURE — 80048 BASIC METABOLIC PNL TOTAL CA: CPT | Performed by: INTERNAL MEDICINE

## 2018-01-01 PROCEDURE — A9270 NON-COVERED ITEM OR SERVICE: HCPCS | Mod: GY | Performed by: INTERNAL MEDICINE

## 2018-01-01 PROCEDURE — 99222 1ST HOSP IP/OBS MODERATE 55: CPT | Mod: 25 | Performed by: INTERNAL MEDICINE

## 2018-01-01 PROCEDURE — 99213 OFFICE O/P EST LOW 20 MIN: CPT | Performed by: NURSE PRACTITIONER

## 2018-01-01 PROCEDURE — A9270 NON-COVERED ITEM OR SERVICE: HCPCS | Mod: GY | Performed by: PHYSICIAN ASSISTANT

## 2018-01-01 PROCEDURE — 99215 OFFICE O/P EST HI 40 MIN: CPT | Performed by: NURSE PRACTITIONER

## 2018-01-01 PROCEDURE — 82728 ASSAY OF FERRITIN: CPT | Performed by: INTERNAL MEDICINE

## 2018-01-01 PROCEDURE — 83550 IRON BINDING TEST: CPT | Performed by: INTERNAL MEDICINE

## 2018-01-01 PROCEDURE — 99205 OFFICE O/P NEW HI 60 MIN: CPT | Performed by: INTERNAL MEDICINE

## 2018-01-01 PROCEDURE — 36416 COLLJ CAPILLARY BLOOD SPEC: CPT | Performed by: INTERNAL MEDICINE

## 2018-01-01 PROCEDURE — 99214 OFFICE O/P EST MOD 30 MIN: CPT | Performed by: INTERNAL MEDICINE

## 2018-01-01 PROCEDURE — 99207 ZZC CDG-CODE CATEGORY CHANGED: CPT | Performed by: HOSPITALIST

## 2018-01-01 PROCEDURE — 36416 COLLJ CAPILLARY BLOOD SPEC: CPT

## 2018-01-01 PROCEDURE — 81001 URINALYSIS AUTO W/SCOPE: CPT | Performed by: PHYSICIAN ASSISTANT

## 2018-01-01 PROCEDURE — A9502 TC99M TETROFOSMIN: HCPCS | Performed by: INTERNAL MEDICINE

## 2018-01-01 PROCEDURE — 93306 TTE W/DOPPLER COMPLETE: CPT | Mod: 26 | Performed by: INTERNAL MEDICINE

## 2018-01-01 PROCEDURE — 25000132 ZZH RX MED GY IP 250 OP 250 PS 637: Mod: GY | Performed by: PHYSICIAN ASSISTANT

## 2018-01-01 PROCEDURE — 73080 X-RAY EXAM OF ELBOW: CPT | Mod: RT

## 2018-01-01 PROCEDURE — 99207 ZZC NO CHARGE NURSE ONLY: CPT

## 2018-01-01 PROCEDURE — 85027 COMPLETE CBC AUTOMATED: CPT | Performed by: PHYSICIAN ASSISTANT

## 2018-01-01 PROCEDURE — 85610 PROTHROMBIN TIME: CPT | Mod: QW | Performed by: INTERNAL MEDICINE

## 2018-01-01 PROCEDURE — 78452 HT MUSCLE IMAGE SPECT MULT: CPT | Mod: 26 | Performed by: INTERNAL MEDICINE

## 2018-01-01 PROCEDURE — 80048 BASIC METABOLIC PNL TOTAL CA: CPT | Performed by: EMERGENCY MEDICINE

## 2018-01-01 PROCEDURE — 85610 PROTHROMBIN TIME: CPT | Performed by: INTERNAL MEDICINE

## 2018-01-01 PROCEDURE — 99218 ZZC INITIAL OBSERVATION CARE,LEVL I: CPT | Performed by: PHYSICIAN ASSISTANT

## 2018-01-01 PROCEDURE — 40000193 ZZH STATISTIC PT WARD VISIT: Performed by: PHYSICAL THERAPIST

## 2018-01-01 PROCEDURE — 95803 ACTIGRAPHY TESTING: CPT | Performed by: INTERNAL MEDICINE

## 2018-01-01 PROCEDURE — 40000275 ZZH STATISTIC RCP TIME EA 10 MIN

## 2018-01-01 PROCEDURE — 97116 GAIT TRAINING THERAPY: CPT | Mod: GP

## 2018-01-01 PROCEDURE — 84443 ASSAY THYROID STIM HORMONE: CPT | Performed by: PHYSICIAN ASSISTANT

## 2018-01-01 PROCEDURE — 99284 EMERGENCY DEPT VISIT MOD MDM: CPT | Mod: 25

## 2018-01-01 PROCEDURE — G0378 HOSPITAL OBSERVATION PER HR: HCPCS

## 2018-01-01 PROCEDURE — 36415 COLL VENOUS BLD VENIPUNCTURE: CPT | Performed by: PHYSICIAN ASSISTANT

## 2018-01-01 PROCEDURE — 85025 COMPLETE CBC W/AUTO DIFF WBC: CPT | Performed by: INTERNAL MEDICINE

## 2018-01-01 PROCEDURE — 36415 COLL VENOUS BLD VENIPUNCTURE: CPT | Performed by: INTERNAL MEDICINE

## 2018-01-01 PROCEDURE — 12000000 ZZH R&B MED SURG/OB

## 2018-01-01 PROCEDURE — 93005 ELECTROCARDIOGRAM TRACING: CPT

## 2018-01-01 PROCEDURE — 70450 CT HEAD/BRAIN W/O DYE: CPT

## 2018-01-01 PROCEDURE — 97110 THERAPEUTIC EXERCISES: CPT | Mod: GP

## 2018-01-01 PROCEDURE — 99207 ZZC NO CHARGE NURSE ONLY: CPT | Performed by: INTERNAL MEDICINE

## 2018-01-01 PROCEDURE — 93270 REMOTE 30 DAY ECG REV/REPORT: CPT | Performed by: PHYSICIAN ASSISTANT

## 2018-01-01 PROCEDURE — 73502 X-RAY EXAM HIP UNI 2-3 VIEWS: CPT

## 2018-01-01 PROCEDURE — 85610 PROTHROMBIN TIME: CPT | Mod: QW

## 2018-01-01 PROCEDURE — 93018 CV STRESS TEST I&R ONLY: CPT | Performed by: INTERNAL MEDICINE

## 2018-01-01 PROCEDURE — 40000855 ZZH STATISTIC ECHO STRESS OR NM NPI

## 2018-01-01 PROCEDURE — 99225 ZZC SUBSEQUENT OBSERVATION CARE,LEVEL II: CPT | Performed by: HOSPITALIST

## 2018-01-01 PROCEDURE — 99231 SBSQ HOSP IP/OBS SF/LOW 25: CPT | Performed by: NURSE PRACTITIONER

## 2018-01-01 PROCEDURE — 80076 HEPATIC FUNCTION PANEL: CPT | Performed by: INTERNAL MEDICINE

## 2018-01-01 PROCEDURE — 93306 TTE W/DOPPLER COMPLETE: CPT

## 2018-01-01 PROCEDURE — 25000132 ZZH RX MED GY IP 250 OP 250 PS 637: Mod: GY | Performed by: HOSPITALIST

## 2018-01-01 PROCEDURE — 99215 OFFICE O/P EST HI 40 MIN: CPT | Performed by: INTERNAL MEDICINE

## 2018-01-01 PROCEDURE — 40000061 ZZH STATISTIC EEG TIME EA 10 MIN

## 2018-01-01 PROCEDURE — 85610 PROTHROMBIN TIME: CPT | Performed by: PHYSICIAN ASSISTANT

## 2018-01-01 PROCEDURE — 70551 MRI BRAIN STEM W/O DYE: CPT

## 2018-01-01 PROCEDURE — 84443 ASSAY THYROID STIM HORMONE: CPT | Performed by: INTERNAL MEDICINE

## 2018-01-01 PROCEDURE — 94762 N-INVAS EAR/PLS OXIMTRY CONT: CPT

## 2018-01-01 PROCEDURE — 93017 CV STRESS TEST TRACING ONLY: CPT

## 2018-01-01 PROCEDURE — 87086 URINE CULTURE/COLONY COUNT: CPT | Performed by: INTERNAL MEDICINE

## 2018-01-01 PROCEDURE — 25000132 ZZH RX MED GY IP 250 OP 250 PS 637: Mod: GY | Performed by: EMERGENCY MEDICINE

## 2018-01-01 PROCEDURE — 99233 SBSQ HOSP IP/OBS HIGH 50: CPT | Performed by: INTERNAL MEDICINE

## 2018-01-01 PROCEDURE — 85610 PROTHROMBIN TIME: CPT | Performed by: EMERGENCY MEDICINE

## 2018-01-01 PROCEDURE — 93016 CV STRESS TEST SUPVJ ONLY: CPT | Performed by: INTERNAL MEDICINE

## 2018-01-01 PROCEDURE — G0463 HOSPITAL OUTPT CLINIC VISIT: HCPCS

## 2018-01-01 PROCEDURE — 95813 EEG EXTND MNTR 61-119 MIN: CPT

## 2018-01-01 PROCEDURE — 80048 BASIC METABOLIC PNL TOTAL CA: CPT | Performed by: PHYSICIAN ASSISTANT

## 2018-01-01 PROCEDURE — 99207 ZZC APP CREDIT; MD BILLING SHARED VISIT: CPT | Performed by: NURSE PRACTITIONER

## 2018-01-01 PROCEDURE — 85025 COMPLETE CBC W/AUTO DIFF WBC: CPT | Performed by: EMERGENCY MEDICINE

## 2018-01-01 PROCEDURE — 97161 PT EVAL LOW COMPLEX 20 MIN: CPT | Mod: GP | Performed by: PHYSICAL THERAPIST

## 2018-01-01 PROCEDURE — 99239 HOSP IP/OBS DSCHRG MGMT >30: CPT | Performed by: INTERNAL MEDICINE

## 2018-01-01 PROCEDURE — 83540 ASSAY OF IRON: CPT | Performed by: INTERNAL MEDICINE

## 2018-01-01 PROCEDURE — 99222 1ST HOSP IP/OBS MODERATE 55: CPT | Performed by: NURSE PRACTITIONER

## 2018-01-01 PROCEDURE — A9270 NON-COVERED ITEM OR SERVICE: HCPCS | Mod: GY | Performed by: EMERGENCY MEDICINE

## 2018-01-01 PROCEDURE — 93010 ELECTROCARDIOGRAM REPORT: CPT | Performed by: INTERNAL MEDICINE

## 2018-01-01 PROCEDURE — 97161 PT EVAL LOW COMPLEX 20 MIN: CPT | Mod: GP

## 2018-01-01 PROCEDURE — 34300033 ZZH RX 343: Performed by: INTERNAL MEDICINE

## 2018-01-01 PROCEDURE — 78452 HT MUSCLE IMAGE SPECT MULT: CPT

## 2018-01-01 PROCEDURE — 99207 ZZC DOWN CODE DUE TO INITIAL EXAM: CPT | Performed by: PHYSICIAN ASSISTANT

## 2018-01-01 RX ORDER — LOSARTAN POTASSIUM 50 MG/1
50 TABLET ORAL DAILY
Status: DISCONTINUED | OUTPATIENT
Start: 2018-01-01 | End: 2018-01-01

## 2018-01-01 RX ORDER — MULTIPLE VITAMINS W/ MINERALS TAB 9MG-400MCG
1 TAB ORAL DAILY
Qty: 30 EACH | Refills: 0 | DISCHARGE
Start: 2018-01-01

## 2018-01-01 RX ORDER — WARFARIN SODIUM 2.5 MG/1
TABLET ORAL
Qty: 180 TABLET | Refills: 0 | Status: ON HOLD | OUTPATIENT
Start: 2018-01-01 | End: 2018-01-01

## 2018-01-01 RX ORDER — ONDANSETRON 4 MG/1
4 TABLET, ORALLY DISINTEGRATING ORAL EVERY 6 HOURS PRN
Status: DISCONTINUED | OUTPATIENT
Start: 2018-01-01 | End: 2018-01-01 | Stop reason: HOSPADM

## 2018-01-01 RX ORDER — METOPROLOL TARTRATE 50 MG
50 TABLET ORAL 2 TIMES DAILY
Status: DISCONTINUED | OUTPATIENT
Start: 2018-01-01 | End: 2018-01-01 | Stop reason: HOSPADM

## 2018-01-01 RX ORDER — ONDANSETRON 2 MG/ML
4 INJECTION INTRAMUSCULAR; INTRAVENOUS EVERY 6 HOURS PRN
Status: DISCONTINUED | OUTPATIENT
Start: 2018-01-01 | End: 2018-01-01 | Stop reason: HOSPADM

## 2018-01-01 RX ORDER — NALOXONE HYDROCHLORIDE 0.4 MG/ML
.1-.4 INJECTION, SOLUTION INTRAMUSCULAR; INTRAVENOUS; SUBCUTANEOUS
Status: DISCONTINUED | OUTPATIENT
Start: 2018-01-01 | End: 2018-01-01 | Stop reason: HOSPADM

## 2018-01-01 RX ORDER — TORSEMIDE 10 MG/1
10 TABLET ORAL DAILY
Status: DISCONTINUED | OUTPATIENT
Start: 2018-01-01 | End: 2018-01-01 | Stop reason: HOSPADM

## 2018-01-01 RX ORDER — LOSARTAN POTASSIUM 100 MG/1
100 TABLET ORAL DAILY
COMMUNITY
Start: 2017-01-01 | End: 2018-01-01

## 2018-01-01 RX ORDER — TORSEMIDE 20 MG/1
20 TABLET ORAL DAILY
Qty: 90 TABLET | Refills: 3 | Status: ON HOLD | OUTPATIENT
Start: 2018-01-01 | End: 2018-01-01

## 2018-01-01 RX ORDER — LOSARTAN POTASSIUM 50 MG/1
50 TABLET ORAL DAILY
Status: DISCONTINUED | OUTPATIENT
Start: 2018-01-01 | End: 2018-01-01 | Stop reason: HOSPADM

## 2018-01-01 RX ORDER — ALBUTEROL SULFATE 90 UG/1
2 AEROSOL, METERED RESPIRATORY (INHALATION) EVERY 5 MIN PRN
Status: DISCONTINUED | OUTPATIENT
Start: 2018-01-01 | End: 2018-01-01

## 2018-01-01 RX ORDER — ASPIRIN 81 MG/1
81 TABLET ORAL DAILY
Status: DISCONTINUED | OUTPATIENT
Start: 2018-01-01 | End: 2018-01-01 | Stop reason: HOSPADM

## 2018-01-01 RX ORDER — ACETAMINOPHEN 650 MG/1
650 SUPPOSITORY RECTAL EVERY 4 HOURS PRN
Status: DISCONTINUED | OUTPATIENT
Start: 2018-01-01 | End: 2018-01-01 | Stop reason: HOSPADM

## 2018-01-01 RX ORDER — SIMVASTATIN 40 MG
TABLET ORAL
Qty: 90 TABLET | Refills: 0 | Status: SHIPPED | OUTPATIENT
Start: 2018-01-01 | End: 2018-01-01

## 2018-01-01 RX ORDER — WARFARIN SODIUM 2.5 MG/1
2.5 TABLET ORAL DAILY
Qty: 90 TABLET | Refills: 0 | Status: SHIPPED | OUTPATIENT
Start: 2018-01-01 | End: 2018-01-01

## 2018-01-01 RX ORDER — WARFARIN SODIUM 7.5 MG/1
7.5 TABLET ORAL ONCE
Status: COMPLETED | OUTPATIENT
Start: 2018-01-01 | End: 2018-01-01

## 2018-01-01 RX ORDER — ATENOLOL 50 MG/1
TABLET ORAL
Qty: 45 TABLET | Refills: 0 | Status: SHIPPED | OUTPATIENT
Start: 2018-01-01 | End: 2018-01-01

## 2018-01-01 RX ORDER — LOSARTAN POTASSIUM 100 MG/1
100 TABLET ORAL DAILY
Qty: 90 TABLET | Refills: 3 | Status: ON HOLD | OUTPATIENT
Start: 2018-01-01 | End: 2018-01-01

## 2018-01-01 RX ORDER — WARFARIN SODIUM 2.5 MG/1
TABLET ORAL
Qty: 90 TABLET | Refills: 0 | Status: CANCELLED | OUTPATIENT
Start: 2018-01-01

## 2018-01-01 RX ORDER — REGADENOSON 0.08 MG/ML
0.4 INJECTION, SOLUTION INTRAVENOUS ONCE
Status: COMPLETED | OUTPATIENT
Start: 2018-01-01 | End: 2018-01-01

## 2018-01-01 RX ORDER — METOPROLOL TARTRATE 25 MG/1
50 TABLET, FILM COATED ORAL 2 TIMES DAILY
Qty: 120 TABLET | Refills: 11 | Status: SHIPPED | OUTPATIENT
Start: 2018-01-01

## 2018-01-01 RX ORDER — BUDESONIDE AND FORMOTEROL FUMARATE DIHYDRATE 160; 4.5 UG/1; UG/1
2 AEROSOL RESPIRATORY (INHALATION) 2 TIMES DAILY PRN
COMMUNITY
End: 2018-01-01

## 2018-01-01 RX ORDER — WARFARIN SODIUM 2.5 MG/1
2.5 TABLET ORAL DAILY
COMMUNITY
Start: 2017-01-01 | End: 2018-01-01

## 2018-01-01 RX ORDER — LANOLIN ALCOHOL/MO/W.PET/CERES
3 CREAM (GRAM) TOPICAL AT BEDTIME
COMMUNITY
Start: 2017-01-01 | End: 2018-01-01

## 2018-01-01 RX ORDER — MULTIPLE VITAMINS W/ MINERALS TAB 9MG-400MCG
1 TAB ORAL DAILY
Status: DISCONTINUED | OUTPATIENT
Start: 2018-01-01 | End: 2018-01-01 | Stop reason: HOSPADM

## 2018-01-01 RX ORDER — CLOTRIMAZOLE 1 %
CREAM (GRAM) TOPICAL 2 TIMES DAILY
COMMUNITY

## 2018-01-01 RX ORDER — DOCUSATE SODIUM 100 MG/1
100 CAPSULE, LIQUID FILLED ORAL 2 TIMES DAILY PRN
COMMUNITY
Start: 2017-01-01 | End: 2018-01-01

## 2018-01-01 RX ORDER — MODAFINIL 100 MG/1
100 TABLET ORAL DAILY
Qty: 30 TABLET | Refills: 3 | Status: SHIPPED | OUTPATIENT
Start: 2018-01-01

## 2018-01-01 RX ORDER — WARFARIN SODIUM 2.5 MG/1
TABLET ORAL
OUTPATIENT
Start: 2018-01-01

## 2018-01-01 RX ORDER — CEPHALEXIN 500 MG/1
500 CAPSULE ORAL 2 TIMES DAILY
Qty: 14 CAPSULE | Refills: 0 | Status: SHIPPED | OUTPATIENT
Start: 2018-01-01 | End: 2018-01-01

## 2018-01-01 RX ORDER — METOPROLOL TARTRATE 25 MG/1
50 TABLET, FILM COATED ORAL 2 TIMES DAILY
Qty: 120 TABLET | Refills: 3
Start: 2018-01-01 | End: 2018-01-01

## 2018-01-01 RX ORDER — ASPIRIN 81 MG/1
81 TABLET ORAL DAILY
Qty: 90 TABLET | Refills: 3 | Status: SHIPPED | OUTPATIENT
Start: 2018-01-01

## 2018-01-01 RX ORDER — CLOTRIMAZOLE AND BETAMETHASONE DIPROPIONATE 10; .64 MG/G; MG/G
CREAM TOPICAL 2 TIMES DAILY
Qty: 15 G | Refills: 1 | Status: SHIPPED | OUTPATIENT
Start: 2018-01-01

## 2018-01-01 RX ORDER — ACYCLOVIR 200 MG/1
0-1 CAPSULE ORAL
Status: DISCONTINUED | OUTPATIENT
Start: 2018-01-01 | End: 2018-01-01 | Stop reason: HOSPADM

## 2018-01-01 RX ORDER — METOPROLOL TARTRATE 25 MG/1
25 TABLET, FILM COATED ORAL 2 TIMES DAILY
Qty: 120 TABLET | Refills: 3 | Status: SHIPPED | OUTPATIENT
Start: 2018-01-01 | End: 2018-01-01

## 2018-01-01 RX ORDER — ACETAMINOPHEN 325 MG/1
650 TABLET ORAL EVERY 4 HOURS PRN
Status: DISCONTINUED | OUTPATIENT
Start: 2018-01-01 | End: 2018-01-01 | Stop reason: HOSPADM

## 2018-01-01 RX ORDER — ASPIRIN 81 MG/1
81 TABLET ORAL DAILY
COMMUNITY
Start: 2017-01-01 | End: 2018-01-01

## 2018-01-01 RX ORDER — METOPROLOL TARTRATE 25 MG/1
25 TABLET, FILM COATED ORAL 2 TIMES DAILY
COMMUNITY
Start: 2017-01-01 | End: 2018-01-01

## 2018-01-01 RX ORDER — TORSEMIDE 10 MG/1
10 TABLET ORAL DAILY
Qty: 90 TABLET | Refills: 3 | Status: SHIPPED | OUTPATIENT
Start: 2018-01-01 | End: 2018-01-01

## 2018-01-01 RX ORDER — TORSEMIDE 10 MG/1
20 TABLET ORAL DAILY
Qty: 90 TABLET | Refills: 3
Start: 2018-01-01 | End: 2018-01-01

## 2018-01-01 RX ORDER — WARFARIN SODIUM 2.5 MG/1
TABLET ORAL
Qty: 90 TABLET | Refills: 0 | Status: SHIPPED | OUTPATIENT
Start: 2018-01-01 | End: 2018-01-01

## 2018-01-01 RX ORDER — AMINOPHYLLINE 25 MG/ML
50-100 INJECTION, SOLUTION INTRAVENOUS
Status: DISCONTINUED | OUTPATIENT
Start: 2018-01-01 | End: 2018-01-01

## 2018-01-01 RX ADMIN — LOSARTAN POTASSIUM 50 MG: 50 TABLET ORAL at 08:38

## 2018-01-01 RX ADMIN — MULTIPLE VITAMINS W/ MINERALS TAB 1 TABLET: TAB at 09:31

## 2018-01-01 RX ADMIN — SERTRALINE HYDROCHLORIDE 50 MG: 50 TABLET ORAL at 08:31

## 2018-01-01 RX ADMIN — TORSEMIDE 10 MG: 10 TABLET ORAL at 08:40

## 2018-01-01 RX ADMIN — MULTIPLE VITAMINS W/ MINERALS TAB 1 TABLET: TAB at 10:21

## 2018-01-01 RX ADMIN — ASPIRIN 81 MG: 81 TABLET, COATED ORAL at 08:31

## 2018-01-01 RX ADMIN — LOSARTAN POTASSIUM 50 MG: 50 TABLET ORAL at 08:30

## 2018-01-01 RX ADMIN — METOPROLOL TARTRATE 50 MG: 50 TABLET, FILM COATED ORAL at 08:41

## 2018-01-01 RX ADMIN — MULTIPLE VITAMINS W/ MINERALS TAB 1 TABLET: TAB at 10:58

## 2018-01-01 RX ADMIN — SERTRALINE HYDROCHLORIDE 50 MG: 50 TABLET ORAL at 10:58

## 2018-01-01 RX ADMIN — SERTRALINE HYDROCHLORIDE 50 MG: 50 TABLET ORAL at 10:21

## 2018-01-01 RX ADMIN — TORSEMIDE 10 MG: 10 TABLET ORAL at 09:31

## 2018-01-01 RX ADMIN — MULTIPLE VITAMINS W/ MINERALS TAB 1 TABLET: TAB at 18:32

## 2018-01-01 RX ADMIN — SERTRALINE HYDROCHLORIDE 50 MG: 50 TABLET ORAL at 08:38

## 2018-01-01 RX ADMIN — LOSARTAN POTASSIUM 50 MG: 50 TABLET ORAL at 09:31

## 2018-01-01 RX ADMIN — LOSARTAN POTASSIUM 50 MG: 50 TABLET ORAL at 08:41

## 2018-01-01 RX ADMIN — MULTIPLE VITAMINS W/ MINERALS TAB 1 TABLET: TAB at 08:31

## 2018-01-01 RX ADMIN — MULTIPLE VITAMINS W/ MINERALS TAB 1 TABLET: TAB at 08:40

## 2018-01-01 RX ADMIN — METOPROLOL TARTRATE 50 MG: 50 TABLET, FILM COATED ORAL at 22:31

## 2018-01-01 RX ADMIN — METOPROLOL TARTRATE 50 MG: 50 TABLET, FILM COATED ORAL at 10:21

## 2018-01-01 RX ADMIN — TORSEMIDE 10 MG: 10 TABLET ORAL at 09:38

## 2018-01-01 RX ADMIN — SERTRALINE HYDROCHLORIDE 50 MG: 50 TABLET ORAL at 09:38

## 2018-01-01 RX ADMIN — ASPIRIN 81 MG: 81 TABLET, COATED ORAL at 10:21

## 2018-01-01 RX ADMIN — LOSARTAN POTASSIUM 50 MG: 50 TABLET ORAL at 19:35

## 2018-01-01 RX ADMIN — TETROFOSMIN 8 MCI.: 1.38 INJECTION, POWDER, LYOPHILIZED, FOR SOLUTION INTRAVENOUS at 10:55

## 2018-01-01 RX ADMIN — SERTRALINE HYDROCHLORIDE 50 MG: 50 TABLET ORAL at 08:40

## 2018-01-01 RX ADMIN — METOPROLOL TARTRATE 50 MG: 50 TABLET, FILM COATED ORAL at 22:14

## 2018-01-01 RX ADMIN — ASPIRIN 81 MG: 81 TABLET, COATED ORAL at 09:31

## 2018-01-01 RX ADMIN — WARFARIN SODIUM 7.5 MG: 7.5 TABLET ORAL at 15:02

## 2018-01-01 RX ADMIN — METOPROLOL TARTRATE 50 MG: 50 TABLET, FILM COATED ORAL at 08:31

## 2018-01-01 RX ADMIN — SERTRALINE HYDROCHLORIDE 50 MG: 50 TABLET ORAL at 09:31

## 2018-01-01 RX ADMIN — LOSARTAN POTASSIUM 50 MG: 50 TABLET ORAL at 10:20

## 2018-01-01 RX ADMIN — MULTIPLE VITAMINS W/ MINERALS TAB 1 TABLET: TAB at 09:38

## 2018-01-01 RX ADMIN — ASPIRIN 81 MG: 81 TABLET, COATED ORAL at 17:29

## 2018-01-01 RX ADMIN — METOPROLOL TARTRATE 50 MG: 50 TABLET, FILM COATED ORAL at 09:31

## 2018-01-01 RX ADMIN — MICONAZOLE NITRATE: 2 POWDER TOPICAL at 22:15

## 2018-01-01 RX ADMIN — REGADENOSON 0.4 MG: 0.08 INJECTION, SOLUTION INTRAVENOUS at 12:57

## 2018-01-01 RX ADMIN — TETROFOSMIN 26 MCI.: 1.38 INJECTION, POWDER, LYOPHILIZED, FOR SOLUTION INTRAVENOUS at 13:00

## 2018-01-01 RX ADMIN — ACETAMINOPHEN 650 MG: 325 TABLET, FILM COATED ORAL at 01:55

## 2018-01-01 RX ADMIN — METOPROLOL TARTRATE 50 MG: 50 TABLET, FILM COATED ORAL at 21:33

## 2018-01-01 RX ADMIN — TORSEMIDE 10 MG: 10 TABLET ORAL at 08:30

## 2018-01-01 RX ADMIN — Medication 1 MG: at 22:53

## 2018-01-01 RX ADMIN — ASPIRIN 81 MG: 81 TABLET, COATED ORAL at 08:40

## 2018-01-01 RX ADMIN — METOPROLOL TARTRATE 50 MG: 50 TABLET, FILM COATED ORAL at 21:07

## 2018-01-01 RX ADMIN — METOPROLOL TARTRATE 50 MG: 50 TABLET, FILM COATED ORAL at 21:34

## 2018-01-01 RX ADMIN — Medication 1 MG: at 22:14

## 2018-01-01 RX ADMIN — METOPROLOL TARTRATE 50 MG: 50 TABLET, FILM COATED ORAL at 20:58

## 2018-01-01 RX ADMIN — METOPROLOL TARTRATE 50 MG: 50 TABLET, FILM COATED ORAL at 08:38

## 2018-01-01 RX ADMIN — ASPIRIN 81 MG: 81 TABLET, COATED ORAL at 09:38

## 2018-01-01 RX ADMIN — Medication 1 MG: at 23:36

## 2018-01-01 RX ADMIN — METOPROLOL TARTRATE 50 MG: 50 TABLET, FILM COATED ORAL at 10:58

## 2018-01-01 RX ADMIN — LOSARTAN POTASSIUM 50 MG: 50 TABLET ORAL at 10:58

## 2018-01-01 RX ADMIN — METOPROLOL TARTRATE 50 MG: 50 TABLET, FILM COATED ORAL at 09:38

## 2018-01-01 RX ADMIN — LOSARTAN POTASSIUM 50 MG: 50 TABLET ORAL at 09:38

## 2018-01-01 RX ADMIN — SERTRALINE HYDROCHLORIDE 50 MG: 50 TABLET ORAL at 19:34

## 2018-01-01 RX ADMIN — TORSEMIDE 10 MG: 10 TABLET ORAL at 10:21

## 2018-01-01 ASSESSMENT — ACTIVITIES OF DAILY LIVING (ADL)
TRANSFERRING: 1-->ASSISTIVE EQUIPMENT
BATHING: 0-->INDEPENDENT
RETIRED_COMMUNICATION: 0-->UNDERSTANDS/COMMUNICATES WITHOUT DIFFICULTY
RETIRED_EATING: 0-->INDEPENDENT
TOILETING: 1-->ASSISTIVE EQUIPMENT
WHICH_OF_THE_ABOVE_FUNCTIONAL_RISKS_HAD_A_RECENT_ONSET_OR_CHANGE?: AMBULATION
SWALLOWING: 0-->SWALLOWS FOODS/LIQUIDS WITHOUT DIFFICULTY
AMBULATION: 1-->ASSISTIVE EQUIPMENT
DRESS: 0-->INDEPENDENT
COGNITION: 0 - NO COGNITION ISSUES REPORTED

## 2018-01-01 ASSESSMENT — ENCOUNTER SYMPTOMS: ROS SKIN COMMENTS: ABRASION TO RIGHT FOREHEAD

## 2018-01-15 NOTE — TELEPHONE ENCOUNTER
"[facility] for [facility]      Patient has not been seen by [facility] provider >1 yr.    No information in epic regarding recent \"stroke\" or hospitalization.   Warfarin not on patient's med list.   Notes/orders Warfarin dosing information from rehab not available.    How long has patient been taking Warfarin?    On Lovenox currently?   No current INR referral in [facility].     Patient is currently scheduled with Dr Zaldivar 1-25-18.  Dr Zaldivar not in clinic this week.     If INR/Warfarin need to be managed by [facility] beginning this week---advised patient be scheduled with same day provider tomorrow.    Otherwise, [facility] TCU provider who managed Warfarin and INR while in rehab could be notified with today's INR results for orders in the interim.      Kori MARIA RN,BSN      "

## 2018-01-15 NOTE — TELEPHONE ENCOUNTER
Reason for Call:  INR    Who is calling?  Home Care: [facility]    Phone number:  [phone]    Fax number:      Name of caller: Deb    INR Value:  1.7- finger stick    Are there any other concerns:  No    Can we leave a detailed message on this number? YES    Phone number patient can be reached at: Home number on file [phone] (home)      Call taken on 1/15/2018 at 12:37 PM by Conchita Chakraborty

## 2018-01-15 NOTE — TELEPHONE ENCOUNTER
Reason for Call:  Other Would like to Speak with      Detailed comments: PT recently had a stroke and he would like to speak with someone one on Dr. Zaldivar's team  Phone Number Patient can be reached at: Home number on file [phone] (home)    Best Time: Today    Can we leave a detailed message on this number? YES    Call taken on 1/15/2018 at 9:23 AM by Dalia Butt

## 2018-01-15 NOTE — TELEPHONE ENCOUNTER
"[facility].    Will await return call.      Patient has not been seen by [facility] provider >1 yr.    No information in epic regarding recent \"stroke\" or hospitalization.   Warfarin not on patient's med list.   Notes/orders Warfarin dosing information from rehab not available.    Per note in epic from 12-8-17, patient was being managed by in-house rehab provider until seen in future by Dr Zaldivar.    How long has patient been taking Warfarin?    On Lovenox currently?   No current INR referral in [facility].     Patient is currently scheduled with Dr Zaldivar 1-25-18.  Dr Zaldivar not in clinic this week.     If INR/Warfarin need to be managed by [facility] beginning this week---advised patient be scheduled with same day provider tomorrow.    Otherwise, [facility] TCU provider who managed Warfarin and INR while in rehab could be notified with today's INR results for orders in the interim.      Kori MARIA RN,BSN        "

## 2018-01-15 NOTE — TELEPHONE ENCOUNTER
"Returned call to Xuan, patient's \"significant other\".    No signed  \"Authorization To Discuss Protected Health Information\" in epic.     Xuan says she's responsible for setting up patient's meds today and needs Warfarin dosing advise.    Reports Deb HC nurse placed call to clinic ealier with INR results from today--asking for Warfarin dosing.      Informed Xuan that clinic would return call to Deb, HC nurse for additional information.     Kori MARIA RN,BSN    "

## 2018-01-15 NOTE — TELEPHONE ENCOUNTER
"Routing to Mandi Calles as FYI.  Please be on the lookout for patient's med records from [facility].   Patient to schedule OV tomorrow with NP.   Will need med review and med list update.   Will also need INR referral and INR done in lab?  INR nurse if available after OV with NP.    Patient does have OV scheduled with Dr Zaldivar 1-25-18.    OK's same Warfarin dose as directed by [facility] provider--for patient to take tonight.    Please route back if you have other Warfarin dosing advise.      Deb called back.    Reports patient admitted today to .   Had \"stroke\" in November while in [place].    Was discharged from [facility] TCU Friday.   Hasn't been seen by Dr Zaldivar >1 yr.  Per note 12-8-17 in epic---patient was being followed/managed by in-house [facility] provider during TCU.     Discharge orders from \"in-house\" provider for Warfarin---take 2.5mg FriSatSun and have INR rechecked Monday.   INR on Friday was 1.6 (per nurse)  INR today 1.7  Nurse reports patient not taking Lovenox.     Advised Warfarin 2.5mg again tonight and for patient to schedule OV with NP tomorrow for med review and update, and INR referral.  Also all med records from [facility] to be faxed to clinic today to be available for appointment tomorrow.    Will be attn: Mandi Calles. (Pt might be scheduled with Karen?).  Patient's \"friend\" Xuan will call to schedule the appointment      Kori MARIA RN,BSN    "

## 2018-01-15 NOTE — TELEPHONE ENCOUNTER
No answer, will need to recall.  Last seen 12/2016 - due for OV/and hosp F/U?  Diana Castillo RN

## 2018-01-16 PROBLEM — Z79.01 LONG-TERM (CURRENT) USE OF ANTICOAGULANTS: Status: ACTIVE | Noted: 2018-01-01

## 2018-01-16 NOTE — PATIENT INSTRUCTIONS
We will get discharge information from the hospital   We will get you started with our INR clinic    Please follow up with the stroke neurologist   See DR Zalidvar as planned

## 2018-01-16 NOTE — MR AVS SNAPSHOT
After Visit Summary   1/16/2018    Bj Ponce    MRN: [id number]           Patient Information     Date Of Birth          10/6/1929        Visit Information        Provider Department      1/16/2018 10:30 AM Karen Armendariz APRN [facility]        Today's Diagnoses     Cerebrovascular accident (CVA), unspecified mechanism (H)    -  1      Care Instructions    We will get discharge information from the hospital   We will get you started with our INR clinic    Please follow up with the stroke neurologist   See DR Zaldivar as planned           Follow-ups after your visit        Additional Services     INR CLINIC REFERRAL       Your provider has referred you to INR Services.    Please be aware that coverage of these services is subject to the terms and limitations of your health insurance plan.  Call member services at your health plan with any benefit or coverage questions.    Indication for Anticoagulation: CVA/TIA  If nonstandard INR is desired, indicate goal range and explanation:   Expected Duration of Therapy: Lifetime            NEUROLOGY ADULT REFERRAL       Your provider has referred you for the following:   Consult at [facility]: [facility]  Adriana [phone]   [email or web address]    Please be aware that coverage of these services is subject to the terms and limitations of your health insurance plan.  Call member services at your health plan with any benefit or coverage questions.      Please bring the following with you to your appointment:    (1) Any X-Rays, CTs or MRIs which have been performed.  Contact the facility where they were done to arrange for  prior to your scheduled appointment.    (2) List of current medications  (3) This referral request   (4) Any documents/labs given to you for this referral                  Your next 10 appointments already scheduled     Jan 25, 2018  2:00 PM CST   Office Visit with  "Azar [facility]divar MD   [facility]    [address] [facility] [address] [phone]           Bring a current list of meds and any records pertaining to this visit. For Physicals, please bring immunization records and any forms needing to be filled out. Please arrive 10 minutes early to complete paperwork.              Who to contact     If you have questions or need follow up information about today's clinic visit or your schedule please contact [facility] directly at [phone].  Normal or non-critical lab and imaging results will be communicated to you by [employer], letter or phone within 4 business days after the clinic has received the results. If you do not hear from us within 7 days, please contact the clinic through [employer] or phone. If you have a critical or abnormal lab result, we will notify you by phone as soon as possible.  Submit refill requests through [employer] or call your pharmacy and they will forward the refill request to us. Please allow 3 business days for your refill to be completed.          Additional Information About Your Visit        MyChart Information     [employer] lets you send messages to your doctor, view your test results, renew your prescriptions, schedule appointments and more. To sign up, go to [email or web address] . Click on \"Log in\" on the left side of the screen, which will take you to the Welcome page. Then click on \"Sign up Now\" on the right side of the page.     You will be asked to enter the access code listed below, as well as some personal information. Please follow the directions to create your username and password.     Your access code is: [email or web address]  Expires: 2018 12:00 PM     Your access code will  in 90 days. If you need help or a new code, please call your [facility] or [phone].        Care EveryWhere ID     This is your Care EveryWhere ID. This could be used by other " organizations to access your [place] medical records  [id number]         Blood Pressure from Last 3 Encounters:   12/06/16 156/72   10/09/15 126/80   08/05/14 136/74    Weight from Last 3 Encounters:   12/06/16 142 lb (64.4 kg)   10/09/15 147 lb (66.7 kg)   08/05/14 152 lb 6.4 oz (69.1 kg)              We Performed the Following     INR CLINIC REFERRAL     NEUROLOGY ADULT REFERRAL          Today's Medication Changes          These changes are accurate as of: 1/16/18 12:00 PM.  If you have any questions, ask your nurse or doctor.               These medicines have changed or have updated prescriptions.        Dose/Directions    aspirin EC 81 MG EC tablet   This may have changed:  Another medication with the same name was removed. Continue taking this medication, and follow the directions you see here.   Used for:  Cerebrovascular accident (CVA), unspecified mechanism (H)        Dose:  81 mg   Take 81 mg by mouth daily   Refills:  0       simvastatin 40 MG tablet   Commonly known as:  ZOCOR   This may have changed:  Another medication with the same name was removed. Continue taking this medication, and follow the directions you see here.   Used for:  Hyperlipidemia LDL goal <100        TAKE ONE TABLET BY MOUTH AT BEDTIME   Quantity:  90 tablet   Refills:  3         Stop taking these medicines if you haven't already. Please contact your care team if you have questions.     ALEVE PO           amLODIPine 5 MG tablet   Commonly known as:  NORVASC           atenolol 50 MG tablet   Commonly known as:  TENORMIN           lisinopril-hydrochlorothiazide 20-25 MG per tablet   Commonly known as:  PRINZIDE/ZESTORETIC                    Primary Care Provider Office Phone # Fax #    Azar Zaldivar [phone] [address]  [place] [address]        Equal Access to Services     LALITHA MERA AH: Noam Reinoso, jaquelin lora, rc ramos  priti billyaan ah. [facility] [phone].    ATENCIÓN: Si poli chi, tiene a clifford disposición servicios gratuitos de asistencia lingüística. Kenny al [phone].    We comply with applicable federal civil rights laws and [place] laws. We do not discriminate on the basis of race, color, national origin, age, disability, sex, sexual orientation, or gender identity.            Thank you!     Thank you for choosing [facility]  for your care. Our goal is always to provide you with excellent care. Hearing back from our patients is one way we can continue to improve our services. Please take a few minutes to complete the written survey that you may receive in the mail after your visit with us. Thank you!             Your Updated Medication List - Protect others around you: Learn how to safely use, store and throw away your medicines at www.disposemymeds.org.          This list is accurate as of: 1/16/18 12:00 PM.  Always use your most recent med list.                   Brand Name Dispense Instructions for use Diagnosis    aspirin EC 81 MG EC tablet      Take 81 mg by mouth daily    Cerebrovascular accident (CVA), unspecified mechanism (H)       budesonide-formoterol 160-4.5 MCG/ACT Inhaler    SYMBICORT     Inhale 2 puffs into the lungs 2 times daily as needed    Cerebrovascular accident (CVA), unspecified mechanism (H)       clotrimazole 1 % cream    LOTRIMIN     Apply topically 2 times daily Apply to feet and between toes    Cerebrovascular accident (CVA), unspecified mechanism (H)       docusate sodium 100 MG capsule    COLACE     Take 100 mg by mouth 2 times daily as needed    Cerebrovascular accident (CVA), unspecified mechanism (H)       losartan 100 MG tablet    COZAAR     Take 100 mg by mouth daily    Cerebrovascular accident (CVA), unspecified mechanism (H)       melatonin 3 MG tablet      Take 3 mg by mouth At Bedtime    Cerebrovascular accident (CVA), unspecified mechanism (H)       metoprolol  tartrate 25 MG tablet    LOPRESSOR     Take 25 mg by mouth 2 times daily    Cerebrovascular accident (CVA), unspecified mechanism (H)       simvastatin 40 MG tablet    ZOCOR    90 tablet    TAKE ONE TABLET BY MOUTH AT BEDTIME    Hyperlipidemia LDL goal <100       warfarin 2.5 MG tablet    COUMADIN     Take 2.5 mg by mouth daily AS INSTRUCTED BY INR  CLINIC    Cerebrovascular accident (CVA), unspecified mechanism (H)

## 2018-01-16 NOTE — PROGRESS NOTES
ANTICOAGULATION FOLLOW-UP CLINIC VISIT    Patient Name:  Bj Ponce  Date:  1/16/2018  Contact Type:  Telephone    SUBJECTIVE:        OBJECTIVE    INR Protime   Date Value Ref Range Status   01/16/2018 1.8 (A) 0.86 - 1.14 Final       ASSESSMENT / PLAN  INR assessment SUB    Recheck INR In: 3 DAYS    INR Location [facility]R      Anticoagulation Summary as of 1/16/2018     INR goal 2.0-3.0   Today's INR 1.8!   Maintenance plan No maintenance plan   Full instructions 1/16: 2.5 mg; 1/17: 2.5 mg; 1/18: 2.5 mg   Plan last modified Prachi Olsen RN (1/16/2018)   Next INR check 1/19/2018   Target end date Indefinite    Indications   Intermittent atrial fibrillation (H) [I48.0]  Long-term (current) use of anticoagulants [Z79.01] [Z79.01]         Anticoagulation Episode Summary     [facility]    Preferred lab     Send INR reminders to  ANTICOAGULATION    Comments [facility]; new to  INR on 1/16/18      Anticoagulation Care Providers     Provider Role Specialty Phone number    Azar Zaldivar MD [facility] Internal Medicine [phone]            See the Encounter Report to view Anticoagulation Flowsheet and Dosing Calendar (Go to Encounters tab in chart review, and find the Anticoagulation Therapy Visit)    Dosage adjustment made based on physician directed care plan. Deb from [facility] calling back. Patient saw BRETT Oglesby today, to get INR referral. He was previously managed by provider at [facility] and now will be managed by [facility] clinic.  INR today 1.8.  He has had 12.5 mg/7 days due to HOLD X 2 on 1/9 and 1/10 due to supra readings (5.0 and 3.8 consecutively).  Begin 2.5 mg daily and recheck INR on Friday. Home care has NO record of patient's dosing prior to 1/9/18, so basically we are re-initiating. She will be seeing patient twice a week initially, so much flexibility for INR testing.      rPachi Olsen, RN

## 2018-01-16 NOTE — NURSING NOTE
"Chief Complaint   Patient presents with     INR Followup     [facility]      Medication Reconciliation       Initial There were no vitals taken for this visit. Estimated body mass index is 20.97 kg/(m^2) as calculated from the following:    Height as of 12/6/16: 5' 9\" (1.753 m).    Weight as of 12/6/16: 142 lb (64.4 kg).  Medication Reconciliation: complete   Many med changes made based on discharge med list from [facility] brought in by sig other.  Karen verified that the meds I discontinued were not on the med list we received.      "

## 2018-01-16 NOTE — MR AVS SNAPSHOT
Bj CARROLL Kris   1/16/2018   Anticoagulation Therapy Visit    Description:  88 year old male   Provider:  Azar Zaldivar MD   Department:  [facility] Nurse           INR as of 1/16/2018     Today's INR 1.8!      Anticoagulation Summary as of 1/16/2018     INR goal 2.0-3.0   Today's INR 1.8!   Full instructions 1/16: 2.5 mg; 1/17: 2.5 mg; 1/18: 2.5 mg   Next INR check 1/19/2018    Indications   Intermittent atrial fibrillation (H) [I48.0]  Long-term (current) use of anticoagulants [Z79.01] [Z79.01]         Description     Held warfarin X 2 days due to supra 5.0 on 1/9/18. New to  [facility]. Has [facility]      Contact Numbers     Clinic Number:         January 2018 Details    Sun Mon Tue Wed Thu Fri Sat      1               2               3               4               5               6                 7               8               9               10               11               12               13                 14               15               16      2.5 mg   See details      17      2.5 mg         18      2.5 mg         19            20                 21               22               23               24               25               26               27                 28               29               30               31                   Date Details   01/16 This INR check       Date of next INR:  1/19/2018         How to take your warfarin dose     To take:  2.5 mg Take 1 of the 2.5 mg tablets.

## 2018-01-16 NOTE — TELEPHONE ENCOUNTER
See anticoagulation therapy encounter. Patient saw Mendel today to get INR referral and begin warfarin management by [facility] clinic. Previously, he was managed by provider at [facility].  I spoke with home care nurse, Deb, from [facility].  She is unsure what previous INR dosing was. However, she reports INR 5.0 one week ago and patient held warfarin X 2 days.    Advised 2.5 mg TuWTh and recheck INR Friday.  See anticoagulation flow sheet for dosing over past 7 days. (12.5 mg per her report)  Prachi Olsen RN

## 2018-01-16 NOTE — PROGRESS NOTES
HPI      SUBJECTIVE:   Bj Ponce is a 88 year old male who presents to clinic today for the following health issues:    -- updated our med list  Based on [facility] discharge med list.  Many changes made.      At end of Nov he had a CVA while in [place], [place]. See details below.    Pt just discharged from TCU on Saturday, 3 days ago and is supposed to have home care. They came yesterday and did an INR, but there was a miscommunication about who would be managing this.   Jayden has been feeling ok   Has trouble sleeping. Partly d/t perseverating   Tried a couple medications without improvement. Cannot take Ambien d/t side effects.    Deb [phone] [facility]        We received records from [facility] after the office visit and were reviewed:  L MCA embolism, received TPA. All deficits resolved    Workup showed heavy carotid plaques bilaterally   Echo with EF of 35-40%   He also was positive for influenza and BNP of 16k  Started on atorvastatin which increased his LFTs and was subsequently stopped   1/3 he had a slight bump of potassium (5.1) and kidney function (crt 2.06)       Past Medical History:   Diagnosis Date     afib 2004, 2010, 2012    post op and then again 2010, 2012     ASCVD (arteriosclerotic cardiovascular disease) 2004    angio for cp and 2 vessel dz, cabg done 2004, post op afib     CKD (chronic kidney disease) stage 4, GFR 15-29 ml/min (H)      CVA (cerebral vascular accident) (H) 11/25/2017    L MCA embolism, received TPA. No residual deficits     Genital herpes      HTN (hypertension)      Hypercholesteremia      Pseudogout      PVD (peripheral vascular disease) with claudication (H)      Past Surgical History:   Procedure Laterality Date     CATARACT IOL, RT/LT  2006     CORONARY ARTERY BYPASS  2004     HYDROCELECTOMY SCROTAL  1997     TONSILLECTOMY       TURP  1997     Social History   Substance Use Topics     Smoking status: Former Smoker     Quit date: 4/16/1954      "Smokeless tobacco: Never Used     Alcohol use No     Current Outpatient Prescriptions   Medication Sig Dispense Refill     aspirin EC 81 MG EC tablet Take 81 mg by mouth daily       losartan (COZAAR) 100 MG tablet Take 100 mg by mouth daily       metoprolol tartrate (LOPRESSOR) 25 MG tablet Take 25 mg by mouth 2 times daily       budesonide-formoterol (SYMBICORT) 160-4.5 MCG/ACT Inhaler Inhale 2 puffs into the lungs 2 times daily as needed       melatonin 3 MG tablet Take 3 mg by mouth At Bedtime       docusate sodium (COLACE) 100 MG capsule Take 100 mg by mouth 2 times daily as needed       clotrimazole (LOTRIMIN) 1 % cream Apply topically 2 times daily Apply to feet and between toes       warfarin (COUMADIN) 2.5 MG tablet Take 1 tablet (2.5 mg) by mouth daily OR AS INSTRUCTED BY INR  CLINIC 90 tablet 0     simvastatin (ZOCOR) 40 MG tablet TAKE ONE TABLET BY MOUTH AT BEDTIME 90 tablet 3     No Known Allergies    Reviewed and updated as needed this visit by clinical staff and provider    ROS  Detailed as above       /76 (Cuff Size: Adult Regular)  Pulse 96  Temp 97.4  F (36.3  C) (Oral)  Ht 5' 9\" (1.753 m)  Wt 149 lb (67.6 kg)  SpO2 94%  BMI 22 kg/m2    Physical Exam   Constitutional: He is well-developed, well-nourished, and in no distress.   HENT:   Head: Normocephalic.   Eyes: Conjunctivae are normal.   Pulmonary/Chest: Effort normal.   Neurological: He is alert.   Psychiatric: Mood and affect normal.   Drowsy    Vitals reviewed.      Assessment and Plan:       ICD-10-CM    1. Cerebrovascular accident (CVA), unspecified mechanism (H) I63.9 aspirin EC 81 MG EC tablet     losartan (COZAAR) 100 MG tablet     metoprolol tartrate (LOPRESSOR) 25 MG tablet     budesonide-formoterol (SYMBICORT) 160-4.5 MCG/ACT Inhaler     melatonin 3 MG tablet     docusate sodium (COLACE) 100 MG capsule     clotrimazole (LOTRIMIN) 1 % cream     INR CLINIC REFERRAL     NEUROLOGY ADULT REFERRAL     DISCONTINUED: warfarin " (COUMADIN) 2.5 MG tablet   2. Intermittent atrial fibrillation (H) I48.0    3. Difficulty sleeping G47.9        Recent hospitalization in [place] and was found to have a CVA, given TPA and all deficits resolved.  He was just discharged from TCU over the weekend and is transitioning to home care  He will need INR follow-up, so this is ordered today for our INR clinic. He will have the home care nurse draw the INR as of now.  He has been struggling with sleep, and there was some mention about some mild cognitive impairments, so thought it would be prudent to follow-up with stroke neurologist. Patient and his significant other would like to wait until follow-up with PCP prior to doing this.  After patient left the clinic and thorough review of fax notes was completed, I found that he did have a slight elevation of his potassium and creatinine as noted above in history of present illness. This, however, seems to follow his trend based on previous labs completed here  He has follow-up with his PCP scheduled for next week and should contact us if there are any concerns in the interim       The total visit time was 40 minutes more  than 50% was spent in counseling and coordination of care as discussed above.      Karen Armendariz, APRN, CNP  [facility]

## 2018-01-17 NOTE — TELEPHONE ENCOUNTER
Reason for Call:  Home Health Care    Deb with [facility] called regarding (reason for call):     Orders are needed for this patient.     PT: Eval and treat    OT: Eval and treat    Skilled Nursinx a week for 3 weeks   Pressure ulcer on Coccyx area   Foam dressing to be changed twice a week     Home health aide Services to assist with personal care     Pt was admitted to  on 1/15    Pt Provider: Dr. Zaldivar    Phone Number Homecare Nurse can be reached at: [phone]    Can we leave a detailed message on this number? YES      Call taken on 2018 at 8:24 AM by Savana Valdivia

## 2018-01-19 NOTE — TELEPHONE ENCOUNTER
Reason for Call:  Home Health Care    Leigha with [facility] called regarding (reason for call): Orders    Orders are needed for this patient. OT       OT: 2 x week for 2 weeks, activities of daily living, strengthening and home safety fall prevention       Pt Provider:     Phone Number Homecare Nurse can be reached at: [phone]    Can we leave a detailed message on this number? YES        Call taken on 1/19/2018 at 3:46 PM by Fidel Ramirez

## 2018-01-19 NOTE — PROGRESS NOTES
ANTICOAGULATION FOLLOW-UP CLINIC VISIT    Patient Name:  Bj Ponce  Date:  1/19/2018  Contact Type:  Telephone    SUBJECTIVE:     Patient Findings     Positives Initiation of therapy           OBJECTIVE    INR   Date Value Ref Range Status   01/19/2018 1.4  Final       ASSESSMENT / PLAN  INR assessment SUB    Recheck INR In: 3 DAYS    INR Location [facility] INR      Anticoagulation Summary as of 1/19/2018     INR goal 2.0-3.0   Today's INR 1.4!   Maintenance plan No maintenance plan   Full instructions 1/19: 5 mg; 1/20: 2.5 mg; 1/21: 5 mg   Plan last modified Prachi Olsen, RN (1/16/2018)   Next INR check 1/22/2018   Target end date Indefinite    Indications   Intermittent atrial fibrillation (H) [I48.0]  Long-term (current) use of anticoagulants [Z79.01] [Z79.01]         Anticoagulation Episode Summary     [facility]    Preferred lab     Send INR reminders to CS ANTICOAGULATION    Comments [facility]; new to  INR on 1/16/18      Anticoagulation Care Providers     Provider Role Specialty Phone number    Azar Zaldivar MD [facility] Internal Medicine [phone]            See the Encounter Report to view Anticoagulation Flowsheet and Dosing Calendar (Go to Encounters tab in chart review, and find the Anticoagulation Therapy Visit)    Dosage adjustment made based on physician directed care plan. Charmaine from [facility] reporting INR 1.4.  See flow sheet for dose increase; recheck on 1/22/18 during visit for medication set-up.    Prachi Olsen, RN

## 2018-01-19 NOTE — MR AVS SNAPSHOT
Bj CARROLL Kris   1/19/2018   Anticoagulation Therapy Visit    Description:  88 year old male   Provider:  Azar Zaldivar MD   Department:  [facility]           INR as of 1/19/2018     Today's INR 1.4!      Anticoagulation Summary as of 1/19/2018     INR goal 2.0-3.0   Today's INR 1.4!   Full instructions 1/19: 5 mg; 1/20: 2.5 mg; 1/21: 5 mg   Next INR check 1/22/2018    Indications   Intermittent atrial fibrillation (H) [I48.0]  Long-term (current) use of anticoagulants [Z79.01] [Z79.01]         January 2018 Details    Sun Mon Tue Wed Thu Fri Sat      1               2               3               4               5               6                 7               8               9               10               11               12               13                 14               15               16               17               18               19      5 mg   See details      20      2.5 mg           21      5 mg         22            23               24               25               26               27                 28               29               30               31                   Date Details   01/19 This INR check       Date of next INR:  1/22/2018         How to take your warfarin dose     To take:  2.5 mg Take 1 of the 2.5 mg tablets.    To take:  5 mg Take 2 of the 2.5 mg tablets.

## 2018-01-22 NOTE — TELEPHONE ENCOUNTER
Reason for Call:  INR    Who is calling?  Home Care: [facility]    Phone number:  [phone]    Fax number:  None    Name of caller: Charmaine    INR Value:  1.4    Are there any other concerns:  Yes please call back ASAP    Can we leave a detailed message on this number? YES    Phone number patient can be reached at: NA      Call taken on 1/22/2018 at 12:31 PM by Lindsay Ralph

## 2018-01-22 NOTE — TELEPHONE ENCOUNTER
Left message for Sye to call back to INR nurse - would really like to talk to Sye - as number has been low x2 , need to verify/discuss.  Please try to lync INR nurses.  Diana Castillo RN

## 2018-01-24 NOTE — PROGRESS NOTES
"  SUBJECTIVE:   Bj Ponce is a 88 year old male who presents to clinic today for the following health issues:          Hospital Follow-up Visit:    Hospital/Nursing Home/ Rehab Facility: [facility]  Date of Admission: 12/8/17  Date of Discharge: ***  Reason(s) for Admission: stroke rehab            Problems taking medications regularly:  {NONE DEFAULTED:897330::\"None\"}       Medication changes since discharge: {NONE DEFAULTED:536977::\"None\"}       Problems adhering to non-medication therapy:  {NONE DEFAULTED:296718::\"None\"}  {roomer to stop here, delete this reminder}  Summary of hospitalization:  {HOSPITAL DISCHARGE SUMMARY INFO:407716::\"[facility] discharge summary reviewed\"}  Diagnostic Tests/Treatments reviewed.  Follow up needed: {NONE DEFAULTED:110049::\"none\"}  Other Healthcare Providers Involved in Patient s Care:         {those currently involved after discharge:680300::\"None\"}  Update since discharge: {IMPROVED DEFAULT:494607::\"improved.\"} {include information from family, SNF, care coordination}    Post Discharge Medication Reconciliation: {ACO Med Rec (Provider):920325}.  Plan of care communicated with {Communicate Plan to:227457::\"patient\"}     Coding guidelines for this visit:  Type of Medical   Decision Making Face-to-Face Visit       within 7 Days of discharge Face-to-Face Visit        within 14 days of discharge   Moderate Complexity 32597 08654   High Complexity 41660 29207              {additional problems for provider to add:872903}    Problem list and histories reviewed & adjusted, as indicated.  Additional history: {NONE - AS DOCUMENTED:610240::\"as documented\"}    {HIST REVIEW/ LINKS 2:947144}    Reviewed and updated as needed this visit by clinical staff       Reviewed and updated as needed this visit by Provider         {PROVIDER CHARTING PREFERENCE:281312}    "

## 2018-01-25 PROBLEM — I63.9 CVA (CEREBRAL VASCULAR ACCIDENT) (H): Status: ACTIVE | Noted: 2017-01-01

## 2018-01-25 NOTE — LETTER
[facility]  [address] [facility]  [address]  [place]  [address]  Tel: [phone]    January 29, 2018    Bj Ponce  [address]   [place] [address]        Dear Mr. Ponce,    Your labs are stable with some minor abnormalities but nothing serious.  Your kidney test or creatinine has been high before so this is not new.  Your blood salts and sugar tests are normal and your iron level and thyroid are fine.  Your proteins are low, likely due to your recent health issues.  Your blood count is just fine.     Please follow up with me as we discussed.    If you have any further questions or problems, please contact our office.      Sincerely,    Azar Zaldivar MD/ Eduarda Farah CMA  Results for orders placed or performed in visit on 01/25/18   CBC with platelets   Result Value Ref Range    WBC 6.8 4.0 - 11.0 10e9/L    RBC Count 3.69 (L) 4.4 - 5.9 10e12/L    Hemoglobin 11.1 (L) 13.3 - 17.7 g/dL    Hematocrit 35.8 (L) 40.0 - 53.0 %    MCV 97 78 - 100 fl    MCH 30.1 26.5 - 33.0 pg    MCHC 31.0 (L) 31.5 - 36.5 g/dL    RDW 15.2 (H) 10.0 - 15.0 %    Platelet Count 256 150 - 450 10e9/L   Basic metabolic panel   Result Value Ref Range    Sodium 141 133 - 144 mmol/L    Potassium 5.2 3.4 - 5.3 mmol/L    Chloride 106 94 - 109 mmol/L    Carbon Dioxide 28 20 - 32 mmol/L    Anion Gap 7 3 - 14 mmol/L    Glucose 85 70 - 99 mg/dL    Urea Nitrogen 43 (H) 7 - 30 mg/dL    Creatinine 2.35 (H) 0.66 - 1.25 mg/dL    GFR Estimate 26 (L) >60 mL/min/1.7m2    GFR Estimate If Black 32 (L) >60 mL/min/1.7m2    Calcium 8.7 8.5 - 10.1 mg/dL   Hepatic panel   Result Value Ref Range    Bilirubin Direct 0.1 0.0 - 0.2 mg/dL    Bilirubin Total 0.7 0.2 - 1.3 mg/dL    Albumin 2.4 (L) 3.4 - 5.0 g/dL    Protein Total 6.9 6.8 - 8.8 g/dL    Alkaline Phosphatase 227 (H) 40 - 150 U/L    ALT 25 0 - 70 U/L    AST 43 0 - 45 U/L   TSH with free T4 reflex   Result Value Ref Range    TSH 2.42 0.40 - 4.00 mU/L   Ferritin   Result Value  Ref Range    Ferritin 73 26 - 388 ng/mL   Iron and iron binding capacity   Result Value Ref Range    Iron 41 35 - 180 ug/dL    Iron Binding Cap 275 240 - 430 ug/dL    Iron Saturation Index 15 15 - 46 %   INR   Result Value Ref Range    INR 1.70 (H) 0.86 - 1.14               Enclosure: Lab Results

## 2018-01-25 NOTE — PROGRESS NOTES
The patient presents with his longtime female friend for post hospital and then TCU follow-up.  I do not see records from [place] from his November stroke but I did review the notes from the last office visit here.    The patient presented with an acute stroke in November in [place] and was found to have a left middle cerebral artery embolism.  He was treated with TPA with the abrupt resolution of his symptoms.  He apparently came in with expressive aphasia and facial droop.  During that evaluation he was found to have calcified carotid plaques and an echocardiogram that showed an EF of 35-40%.  He had been on Zocor the last time I saw him over a year ago and apparently this was changed to Lipitor but his LFTs went up so now he is off of that.  He has a history of atrial fibrillation and I encouraged him to take anticoagulation last office visit but he did not want to do that.  He has known history of CKD stage IV as well as peripheral vascular disease and hypercholesterolemia.    The patient is living at [facility] now after getting out of [facility].  He is no longer living in his [place].  He seems to be doing fairly well.  His biggest complaint is anxiety and insomnia.  Apparently was some of this before he went to [place] but it is been worse since.  He does perseverate about things at times at night and has a hard time falling asleep and staying asleep.  He has been on Remeron as well as melatonin without success.  He does not have depression.    He also has bilateral lower extremity edema which is somewhat new.  He has no chest pain or shortness of breath.    They do not have the medication list with him today.  He was previously on atenolol, Zestoretic, Norvasc, simvastatin and aspirin.    He is using a walker now.  He was not before.  He has had no falls.  He denies HEENT, cardiovascular, respiratory, GI,  complaints on review of systems except for his edema.  With respect to  neurologic symptoms he is doing quite well without any expressive aphasia or focal weakness.    He does note that he is generally not as strong as he used to be as well as fatigue and has the insomnia.  He has no other symptoms.    Past Medical History:   Diagnosis Date     afib 2004, 2010, 2012    post op and then again 2010, 2012     ASCVD (arteriosclerotic cardiovascular disease) 2004    angio for cp and 2 vessel dz, cabg done 2004, post op afib     Cardiomyopathy (H) 11/2017    found on echo done during cvi in [facility]     CKD (chronic kidney disease) stage 4, GFR 15-29 ml/min (H)      CVA (cerebral vascular accident) (H) 11/25/2017    L MCA embolism, received TPA. No residual deficits, add coumadin     Genital herpes      HTN (hypertension)      Hypercholesteremia      Pseudogout      PVD (peripheral vascular disease) with claudication (H)      Past Surgical History:   Procedure Laterality Date     CATARACT IOL, RT/LT  2006     CORONARY ARTERY BYPASS  2004     HYDROCELECTOMY SCROTAL  1997     TONSILLECTOMY       TURP  1997     Social History     Social History     Marital status:      Spouse name: N/A     Number of children: 3     Years of education: N/A     Occupational History           Social History Main Topics     Smoking status: Former Smoker     Quit date: 4/16/1954     Smokeless tobacco: Never Used     Alcohol use No     Drug use: No     Sexual activity: Not Currently     Other Topics Concern     Not on file     Social History Narrative     Current Outpatient Prescriptions   Medication Sig Dispense Refill     sertraline (ZOLOFT) 50 MG tablet Take 1 tablet (50 mg) by mouth daily 30 tablet 1     aspirin EC 81 MG EC tablet Take 81 mg by mouth daily       losartan (COZAAR) 100 MG tablet Take 100 mg by mouth daily       metoprolol tartrate (LOPRESSOR) 25 MG tablet Take 25 mg by mouth 2 times daily       melatonin 3 MG tablet Take 3 mg by mouth At Bedtime       clotrimazole (LOTRIMIN) 1  "% cream Apply topically 2 times daily Apply to feet and between toes       warfarin (COUMADIN) 2.5 MG tablet Take 1 tablet (2.5 mg) by mouth daily OR AS INSTRUCTED BY INR  CLINIC 90 tablet 0     No Known Allergies  FAMILY HISTORY NOTED AND REVIEWED    REVIEW OF SYSTEMS: above    PHYSICAL EXAM    /58  Pulse 81  Temp 97.9  F (36.6  C) (Oral)  Ht 5' 9\" (1.753 m)  Wt 133 lb (60.3 kg)  SpO2 100%  BMI 19.64 kg/m2    Patient appears non toxic  Patient able to get onto and off table with min assist    Mouth within normal limits  tms and canals within normal limits  Eyes within normal limits  Neck within normal limits  No supraclavicular, cervical or axillary lymphadenopathy  Thyroid within normal limits  Lungs clear, normal flow  cv irreg, irreg, no murmer, rub or gallop, no jvp, 1+ bilat lower leg edema  Abdomen normal active bowel sounds, soft, non-tender no mgr, no hepatosplenomegaly  Neuro: alert, speech fluent, cn within normal limits, torres well    Labs sent; echo to be done    ASSESSMENT:  1. Acute cvi, resolved, embolic, suspect afib  2. Afib, rate fine, on anticoag  3. Hypertension, controlled  4. ?cmyop, will order echo  5. Ckd, follow up labs  6. Elevated cholesterol, follow up labs and consider statin  7. Pvd, stable  8. Ascvd, stable  9. Fatigue, due to above  10. Anxiety, significant  11. Insomnia, due to anxiety    PLAN:  Labs today  Dc remeron  Add zoloft 50mg  Continue other meds  Echo  Follow up 3 weeks  Leg elevation and limit salt  Call if changes    Azar Zaldivar M.D.        "

## 2018-01-25 NOTE — TELEPHONE ENCOUNTER
"Requested Prescriptions   Pending Prescriptions Disp Refills     simvastatin (ZOCOR) 40 MG tablet [Pharmacy Med Name: SIMVASTATIN 40 MG TABLET] 90 tablet 3     Sig: TAKE ONE TABLET BY MOUTH AT BEDTIME    Statins Protocol Failed    1/25/2018  1:45 AM       Failed - LDL on file in past 12 months    Recent Labs   Lab Test  12/06/16   0936   LDL  78            Passed - No abnormal creatine kinase in past 12 months    No lab results found.         Passed - Recent or future visit with authorizing provider    Patient had office visit in the last year or has a visit in the next 30 days with authorizing provider.  See \"Patient Info\" tab in inbasket, or \"Choose Columns\" in Meds & Orders section of the refill encounter.            Passed - Patient is age 18 or older     Last Written Prescription Date:  2/14/17  Last Fill Quantity: 90 tablet,  # refills: 3   Last Office Visit with G, P or [facility] prescribing provider:  1/16/18 Marcello, 12/06/16 Kayley   Future Office Visit:    Next 5 appointments (look out 90 days)     Jan 25, 2018  2:00 PM CST   [facility]    [address] [facility] [address] [phone]                      atenolol (TENORMIN) 50 MG tablet [Pharmacy Med Name: ATENOLOL 50 MG TABLET] 45 tablet 3     Sig: TAKE ONE-HALF TABLET BY MOUTH ONCE DAILY    Beta-Blockers Protocol Passed    1/25/2018  1:45 AM       Passed - Blood pressure under 140/90    BP Readings from Last 3 Encounters:   01/16/18 118/76   12/06/16 156/72   10/09/15 126/80                Passed - Patient is age 6 or older       Passed - Recent or future visit with authorizing provider's specialty    Patient had office visit in the last year or has a visit in the next 30 days with authorizing provider.  See \"Patient Info\" tab in inbasket, or \"Choose Columns\" in Meds & Orders section of the refill encounter.              Last Written Prescription Date:  " ?  Last Fill Quantity: ?,   # refills: ?  Last Office Visit: 1/16/18 Marcello, 12/06/16 Kayley  Future Office visit:    Next 5 appointments (look out 90 days)     Jan 25, 2018  2:00 PM CST   [facility]    [address] [facility] [address]   [phone]                   Routing refill request to provider for review/approval because:  Drug not active on patient's medication list

## 2018-01-25 NOTE — MR AVS SNAPSHOT
Bj CARROLL Kris   1/25/2018   Anticoagulation Therapy Visit    Description:  88 year old male   Provider:  Azar Zaldivar MD   Department:  [facility]           INR as of 1/25/2018     Today's INR 1.70!      Anticoagulation Summary as of 1/25/2018     INR goal 2.0-3.0   Today's INR 1.70!   Full instructions 1/25: 7.5 mg; 1/26: 7.5 mg; 1/27: 5 mg; 1/28: 7.5 mg   Next INR check 1/29/2018    Indications   Intermittent atrial fibrillation (H) [I48.0]  Long-term (current) use of anticoagulants [Z79.01] [Z79.01]         Contact Numbers     Clinic Number:         January 2018 Details    Sun Mon Tue Wed Thu Fri Sat      1               2               3               4               5               6                 7               8               9               10               11               12               13                 14               15               16               17               18               19               20                 21               22               23               24               25      7.5 mg   See details      26      7.5 mg         27      5 mg           28      7.5 mg         29            30               31                   Date Details   01/25 This INR check       Date of next INR:  1/29/2018         How to take your warfarin dose     To take:  5 mg Take 2 of the 2.5 mg tablets.    To take:  7.5 mg Take 3 of the 2.5 mg tablets.

## 2018-01-25 NOTE — TELEPHONE ENCOUNTER
Reason for Call:  Home Health Care    Yasmany with [facility] called regarding INR: Pt needs to be advised on how much coumadin tonight after his INR today.    Pt Provider: Dr. Zaldivar    Phone Number Homecare Nurse can be reached at: [phone]    Can we leave a detailed message on this number? YES    Phone number patient can be reached at: Other phone number:  [phone]    Best Time: any    Call taken on 1/25/2018 at 4:34 PM by Dalia Butt

## 2018-01-25 NOTE — NURSING NOTE
"Chief Complaint   Patient presents with     Hospital F/U       Initial /81  Pulse 81  Temp 97.9  F (36.6  C) (Oral)  Ht 5' 9\" (1.753 m)  Wt 133 lb (60.3 kg)  SpO2 100%  BMI 19.64 kg/m2 Estimated body mass index is 19.64 kg/(m^2) as calculated from the following:    Height as of this encounter: 5' 9\" (1.753 m).    Weight as of this encounter: 133 lb (60.3 kg).  Medication Reconciliation: complete   Eduarda Farah CMA      "

## 2018-01-25 NOTE — PROGRESS NOTES
Can you please dose this patients coumadin and let the patient know?    Thanks    Azar Zaldivar M.D.

## 2018-01-25 NOTE — PATIENT INSTRUCTIONS
1. Stop the remeron    2. Start the new medication for your anxiety which should gradually help your sleep.  This is called sertraline and you take one daily in the morning.    3. I will have the heart clinic call you to schedule an echocardiogram    4. I need to see you back in 3 to 4 weeks, schedule that today    5. Call if problems before    6. For the leg swelling try to really restrict your sodium intake and keep your legs elevated when you are seated    Azar Zaldivar M.D.

## 2018-01-25 NOTE — PROGRESS NOTES
ANTICOAGULATION FOLLOW-UP CLINIC VISIT    Patient Name:  Bj Ponce  Date:  1/25/2018  Contact Type:  Telephone/ Spoke with Deb ([facility] RN) and provided dosing instruction and recheck date.     SUBJECTIVE:     Patient Findings     Positives Initiation of therapy           OBJECTIVE    INR   Date Value Ref Range Status   01/25/2018 1.70 (H) 0.86 - 1.14 Final     Comment:     Performed at Point of Care  This test is intended for monitoring Coumadin therapy.  Results are not   accurate in patients with prolonged INR due to factor deficiency.         ASSESSMENT / PLAN  INR assessment SUB    Recheck INR In: 4 DAYS    INR Location Home[facility] INR      Anticoagulation Summary as of 1/25/2018     INR goal 2.0-3.0   Today's INR 1.70!   Maintenance plan No maintenance plan   Full instructions 1/25: 7.5 mg; 1/26: 7.5 mg; 1/27: 5 mg; 1/28: 7.5 mg   Plan last modified Prachi Olsen RN (1/16/2018)   Next INR check 1/29/2018   Target end date Indefinite    Indications   Intermittent atrial fibrillation (H) [I48.0]  Long-term (current) use of anticoagulants [Z79.01] [Z79.01]         Anticoagulation Episode Summary     INR check location [facility]    Preferred lab     Send INR reminders to [facility] ANTICOAGULATION    Comments [facility]; new to  INR on 1/16/18      Anticoagulation Care Providers     Provider Role Specialty Phone number    Azar Zaldivar MD [facility] Internal Medicine [phone]            See the Encounter Report to view Anticoagulation Flowsheet and Dosing Calendar (Go to Encounters tab in chart review, and find the Anticoagulation Therapy Visit)    Dosage adjustment made based on physician directed care plan.      Janny Haddad RN

## 2018-01-25 NOTE — TELEPHONE ENCOUNTER
Spoke with home care RN and provided dosing instructions based on INR results for patient.  See Anticoagulation Encounter from 1/25/18 for more details.    Janny Haddad RN

## 2018-01-25 NOTE — MR AVS SNAPSHOT
After Visit Summary   1/25/2018    Bj Ponce    MRN: [id number]           Patient Information     Date Of Birth          10/6/1929        Visit Information        Provider Department      1/25/2018 2:00 PM Azar Zaldivar MD [facility]        Today's Diagnoses     Cerebrovascular accident (CVA) due to embolism of middle cerebral artery, unspecified blood vessel laterality (H)    -  1    CKD (chronic kidney disease) stage 4, GFR 15-29 ml/min (H)        Intermittent atrial fibrillation (H)        Cardiomyopathy, unspecified type (H)        PVD (peripheral vascular disease) with claudication (H)        Hyperlipidemia LDL goal <100        Benign essential hypertension        ASCVD (arteriosclerotic cardiovascular disease)        Long-term (current) use of anticoagulants [Z79.01]        Chronic insomnia        Bilateral leg edema        Other fatigue        Elevated liver enzymes        Anxiety          Care Instructions    1. Stop the remeron    2. Start the new medication for your anxiety which should gradually help your sleep.  This is called sertraline and you take one daily in the morning.    3. I will have the heart clinic call you to schedule an echocardiogram    4. I need to see you back in 3 to 4 weeks, schedule that today    5. Call if problems before    6. For the leg swelling try to really restrict your sodium intake and keep your legs elevated when you are seated    Azar Zaldivar M.D.            Follow-ups after your visit        Future tests that were ordered for you today     Open Future Orders        Priority Expected Expires Ordered    Echocardiogram Complete Routine  1/25/2019 1/25/2018            Who to contact     If you have questions or need follow up information about today's clinic visit or your schedule please contact [facility] directly at [phone].  Normal or non-critical lab and imaging results will be communicated to you by MyChart, letter  "or phone within 4 business days after the clinic has received the results. If you do not hear from us within 7 days, please contact the clinic through [employer] or phone. If you have a critical or abnormal lab result, we will notify you by phone as soon as possible.  Submit refill requests through [employer] or call your pharmacy and they will forward the refill request to us. Please allow 3 business days for your refill to be completed.          Additional Information About Your Visit        [employer] Information     [employer] lets you send messages to your doctor, view your test results, renew your prescriptions, schedule appointments and more. To sign up, go to [email or web address] . Click on \"Log in\" on the left side of the screen, which will take you to the Welcome page. Then click on \"Sign up Now\" on the right side of the page.     You will be asked to enter the access code listed below, as well as some personal information. Please follow the directions to create your username and password.     Your access code is: [email or web address]  Expires: 2018 12:00 PM     Your access code will  in 90 days. If you need help or a new code, please call your [facility] clinic or [phone].        Care EveryWhere ID     This is your Care EveryWhere ID. This could be used by other organizations to access your [facility] medical records  [id number]        Your Vitals Were     Pulse Temperature Height Pulse Oximetry BMI (Body Mass Index)       81 97.9  F (36.6  C) (Oral) 5' 9\" (1.753 m) 100% 19.64 kg/m2        Blood Pressure from Last 3 Encounters:   18 126/58   18 118/76   16 156/72    Weight from Last 3 Encounters:   18 133 lb (60.3 kg)   18 149 lb (67.6 kg)   16 142 lb (64.4 kg)              We Performed the Following     Basic metabolic panel     CBC with platelets     Ferritin     Hepatic panel     Iron and iron binding capacity     TSH with free T4 reflex          Today's Medication " Changes          These changes are accurate as of 1/25/18  2:36 PM.  If you have any questions, ask your nurse or doctor.               Start taking these medicines.        Dose/Directions    sertraline 50 MG tablet   Commonly known as:  ZOLOFT   Used for:  Anxiety   Started by:  Azar Zaldivar MD        Dose:  50 mg   Take 1 tablet (50 mg) by mouth daily   Quantity:  30 tablet   Refills:  1         Stop taking these medicines if you haven't already. Please contact your care team if you have questions.     budesonide-formoterol 160-4.5 MCG/ACT Inhaler   Commonly known as:  SYMBICORT   Stopped by:  Azar Zaldivar MD           docusate sodium 100 MG capsule   Commonly known as:  COLACE   Stopped by:  Azar Zaldivar MD           simvastatin 40 MG tablet   Commonly known as:  ZOCOR   Stopped by:  Azar Zaldivar MD                Where to get your medicines      These medications were sent to [place] [address] [place] [address] [address]     Phone:  [phone]     sertraline 50 MG tablet                Primary Care Provider Office Phone # Fax #    Azar Zaldivar [phone] [address] [address]        Equal Access to Services     [facility] HospitalSTEFANIE AH: Hadii adarsh ku hadasho Soomaali, waaxda luqadaha, qaybta kaalmada adeegyada, waxay billie reynaga. So [facility] [phone].    ATENCIÓN: Si habla español, tiene a clifford disposición servicios gratuitos de asistencia lingüística. ame al [phone].    We comply with applicable federal civil rights laws and [place] laws. We do not discriminate on the basis of race, color, national origin, age, disability, sex, sexual orientation, or gender identity.            Thank you!     Thank you for choosing [facility]  for your care. Our goal is always to provide you with excellent care. Hearing back from our patients is one way we  can continue to improve our services. Please take a few minutes to complete the written survey that you may receive in the mail after your visit with us. Thank you!             Your Updated Medication List - Protect others around you: Learn how to safely use, store and throw away your medicines at www.disposemymeds.org.          This list is accurate as of 1/25/18  2:36 PM.  Always use your most recent med list.                   Brand Name Dispense Instructions for use Diagnosis    aspirin EC 81 MG EC tablet      Take 81 mg by mouth daily    Cerebrovascular accident (CVA), unspecified mechanism (H)       clotrimazole 1 % cream    LOTRIMIN     Apply topically 2 times daily Apply to feet and between toes    Cerebrovascular accident (CVA), unspecified mechanism (H)       losartan 100 MG tablet    COZAAR     Take 100 mg by mouth daily    Cerebrovascular accident (CVA), unspecified mechanism (H)       melatonin 3 MG tablet      Take 3 mg by mouth At Bedtime    Cerebrovascular accident (CVA), unspecified mechanism (H)       metoprolol tartrate 25 MG tablet    LOPRESSOR     Take 25 mg by mouth 2 times daily    Cerebrovascular accident (CVA), unspecified mechanism (H)       sertraline 50 MG tablet    ZOLOFT    30 tablet    Take 1 tablet (50 mg) by mouth daily    Anxiety       warfarin 2.5 MG tablet    COUMADIN    90 tablet    Take 1 tablet (2.5 mg) by mouth daily OR AS INSTRUCTED BY INR  CLINIC    Cerebrovascular accident (CVA), unspecified mechanism (H)

## 2018-01-26 NOTE — TELEPHONE ENCOUNTER
2 med questions from tcu    1. Ok for pt to continue Melatonin?  Nothing in OV notes, but pt recalls PCP advising to discontinue as well as Remeron.    2. Pt is taking Torsemide 10 mg daily, but this is not noted on [facility].  Did find this script in outside records, and may have been prescribed by TCU provider.  Copied below.  Should pt continue this rx?  Please advise.    Diana Castillo RN        New: TORSEMIDE 10 MG TABLET   Add as: torsemide (DEMADEX) 10 MG tablet    (1 dispense in past 1 month)      External Pharmacy 1/15/2018 AddDiscard       Sig: TAKE 1 TAB BY MOUTH DAILY  Dispense date: 1/15/2018  Qty: 30.00

## 2018-01-26 NOTE — TELEPHONE ENCOUNTER
Ok melatonin, no remeron    If he has been on the lasix then continue, but if not then do not use    Azar Zaldivar M.D.

## 2018-01-26 NOTE — TELEPHONE ENCOUNTER
Yes he is currently on torsemide, 10mg daily.  Pended RX for review. Verified pharmacy, prefers 90 day rx.    Deb gave verbal back, no remeron, ok to 3mg melatonin.    Diana Castillo RN

## 2018-01-26 NOTE — TELEPHONE ENCOUNTER
Pt's medication list states that he takes melatonin 3 MG tablet.  Per Pt he no longer takes this. Please call CESAR Woody at [facility]  To verify.  [phone]

## 2018-01-27 NOTE — PROGRESS NOTES
It was a pleasure seeing you.  I wanted to get back to you with your test results.  I have enclosed a copy for your records.    Your labs are stable with some minor abnormalities but nothing serious.  Your kidney test or creatinine has been high before so this is not new.  Your blood salts and sugar tests are normal and your iron level and thyroid are fine.  Your proteins are low, likely due to your recent health issues.  Your blood count is just fine.     Please follow up with me as we discussed.  If you have any questions please call me.

## 2018-01-29 NOTE — MR AVS SNAPSHOT
Bj CARROLL Kris   1/29/2018   Anticoagulation Therapy Visit    Description:  88 year old male   Provider:  Azar Zaldivar MD   Department:  [facility]           INR as of 1/29/2018     Today's INR 3.6!      Anticoagulation Summary as of 1/29/2018     INR goal 2.0-3.0   Today's INR 3.6!   Full instructions 5 mg on Mon, Wed, Fri; 7.5 mg all other days   Next INR check 2/1/2018    Indications   Intermittent atrial fibrillation (H) [I48.0]  Long-term (current) use of anticoagulants [Z79.01] [Z79.01]         January 2018 Details    Sun Mon Tue Wed Thu Fri Sat      1               2               3               4               5               6                 7               8               9               10               11               12               13                 14               15               16               17               18               19               20                 21               22               23               24               25               26               27                 28               29      5 mg   See details      30      7.5 mg         31      5 mg             Date Details   01/29 This INR check               How to take your warfarin dose     To take:  5 mg Take 1 of the 5 mg tablets.    To take:  7.5 mg Take 1.5 of the 5 mg tablets.           February 2018 Details    Sun Mon Tue Wed Thu Fri Sat         1            2               3                 4               5               6               7               8               9               10                 11               12               13               14               15               16               17                 18               19               20               21               22               23               24                 25               26               27               28                   Date Details   No additional details    Date of next INR:  2/1/2018         How to  take your warfarin dose     To take:  7.5 mg Take 1.5 of the 5 mg tablets.

## 2018-01-29 NOTE — PROGRESS NOTES
ANTICOAGULATION FOLLOW-UP CLINIC VISIT    Patient Name:  Bj Ponce  Date:  1/29/2018  Contact Type:  Telephone/ [facility]    SUBJECTIVE:        OBJECTIVE    INR   Date Value Ref Range Status   01/29/2018 3.6  Final       ASSESSMENT / PLAN  INR assessment SUPRA    Recheck INR In: 3 DAYS    [facility]R Location [facility]      Anticoagulation Summary as of 1/29/2018     INR goal 2.0-3.0   Today's INR 3.6!   Maintenance plan 5 mg (5 mg x 1) on Mon, Wed, Fri; 7.5 mg (5 mg x 1.5) all other days   Full instructions 5 mg on Mon, Wed, Fri; 7.5 mg all other days   Weekly total 45 mg   Plan last modified Diana Castillo RN (1/29/2018)   Next INR check 2/1/2018   Target end date Indefinite    Indications   Intermittent atrial fibrillation (H) [I48.0]  Long-term (current) use of anticoagulants [Z79.01] [Z79.01]         Anticoagulation Episode Summary     [facility]    Preferred lab     Send INR reminders to [facility] ANT[facility]AGULATION    Comments [facility]; new to  INR on 1/16/18- To clarify [facility] is different than Facility, ok to give Verbals for INR      Anticoagulation Care Providers     Provider Role Specialty Phone number    Azar Zaldivar MD [facility] Internal Medicine [phone]            See the Encounter Report to view Anticoagulation Flowsheet and Dosing Calendar (Go to Encounters tab in chart review, and find the Anticoagulation Therapy Visit)    Dosage adjustment made based on physician directed care plan.    Diana Castillo RN

## 2018-02-01 NOTE — TELEPHONE ENCOUNTER
Reason for Call:  INR    Who is calling?  [facility]: [facility]    Phone number:  [phone]    Fax number:      Name of caller: Janny    INR Value:  3.9    Are there any other concerns:  Yes: just that INR value went up this week, they were thinking it would go down.     The only change was that patient started taking Zoloft last week.    Please call ASAP!    Can we leave a detailed message on this number? YES        Call taken on 2/1/2018 at 11:28 AM by Bridgette Wheeler  ..

## 2018-02-01 NOTE — PROGRESS NOTES
ANTICOAGULATION FOLLOW-UP CLINIC VISIT    Patient Name:  Bj Ponce  Date:  2/1/2018  Contact Type:  Telephone/ Janny Herman     SUBJECTIVE:     Patient Findings     Positives Change in diet/appetite    Comments Started Zoloft. Some GI upset and Diarrhea           OBJECTIVE    INR   Date Value Ref Range Status   02/01/2018 3.9  Final       ASSESSMENT / PLAN  INR assessment SUPRA    Recheck INR In: 4 DAYS    INR Location [facility]      Anticoagulation Summary as of 2/1/2018     INR goal 2.0-3.0   Today's INR 3.9!   Maintenance plan 5 mg (5 mg x 1) on Mon, Wed, Fri; 7.5 mg (5 mg x 1.5) all other days   Full instructions 2/1: 2.5 mg; 2/2: 2.5 mg; 2/3: 5 mg; 2/4: 2.5 mg; Otherwise 5 mg on Mon, Wed, Fri; 7.5 mg all other days   Weekly total 45 mg   Plan last modified Diana Castillo RN (1/29/2018)   Next INR check 2/5/2018   Target end date Indefinite    Indications   Intermittent atrial fibrillation (H) [I48.0]  Long-term (current) use of anticoagulants [Z79.01] [Z79.01]         Anticoagulation Episode Summary     [facility]    Preferred lab     Send INR reminders to [facility] ANTICOAGULATION    Comments [facility]; new to [facility]R on 1/16/18- To clarify [facility] is different than Facility, ok to give Verbals for INR      Anticoagulation Care Providers     Provider Role Specialty Phone number    Azar Zaldivar MD [facility] Internal Medicine [phone]            See the Encounter Report to view Anticoagulation Flowsheet and Dosing Calendar (Go to Encounters tab in chart review, and find the Anticoagulation Therapy Visit)    Janny confirms and agrees to dosing as noted above.  Patient just started Zoloft and has some GI upset (nausea, diarrhea). Dosing based on [facility] Protocol and Provider directed care plan.      Kate Lowry, RN

## 2018-02-01 NOTE — MR AVS SNAPSHOT
Bj CARROLL Kris   2/1/2018   Anticoagulation Therapy Visit    Description:  88 year old male   Provider:  Azar Zaldivar MD   Department:  [facility]           INR as of 2/1/2018     Today's INR 3.9!      Anticoagulation Summary as of 2/1/2018     INR goal 2.0-3.0   Today's INR 3.9!   Full instructions 2/1: 2.5 mg; 2/2: 2.5 mg; 2/3: 5 mg; 2/4: 2.5 mg; Otherwise 5 mg on Mon, Wed, Fri; 7.5 mg all other days   Next INR check 2/5/2018    Indications   Intermittent atrial fibrillation (H) [I48.0]  Long-term (current) use of anticoagulants [Z79.01] [Z79.01]         Description     Janny from [facility]       Contact Numbers     Clinic Number:         February 2018 Details    Sun Mon Tue Wed Thu Fri Sat         1      2.5 mg   See details      2      2.5 mg         3      5 mg           4      2.5 mg         5            6               7               8               9               10                 11               12               13               14               15               16               17                 18               19               20               21               22               23               24                 25               26               27               28                   Date Details   02/01 This INR check       Date of next INR:  2/5/2018         How to take your warfarin dose     To take:  2.5 mg Take 0.5 of a 5 mg tablet.    To take:  5 mg Take 1 of the 5 mg tablets.

## 2018-02-05 NOTE — TELEPHONE ENCOUNTER
Reason for Call:  Request for results:    Name of test or procedure: INR Results    Date of test of procedure: 02/05/2018    Location of the test or procedure[facility]    OK to leave the result message on voice mail or with a family member? YES    Phone number Patient can be reached at:  [facility]: Mireya-- [phone]          Additional comments: Pt's home care nurse called this morning to report the pt's INR for the day at 2.8 by finger stick. If there are any questions please give them a call back. Thank you.    Call taken on 2/5/2018 at 11:10 AM by Sarah Villar

## 2018-02-05 NOTE — PROGRESS NOTES
ANTICOAGULATION FOLLOW-UP CLINIC VISIT    Patient Name:  Bj Ponce  Date:  2/5/2018  Contact Type:  Telephone/ Spoke with Mireya and provided verbal order with dosing instruction and recheck date.     SUBJECTIVE:     Patient Findings     Positives No Problem Findings    Comments Reason for Call:  Results     Name of test or procedure: INR Results     Date of test of procedure: 02/05/2018     Location of the test or procedure: [facility]     OK to leave the result message on voice mail or with a family member? YES     Phone number Patient can be reached at:  [facility]: Mireya-- [phone]     Pt's home care nurse called this morning to report the pt's INR for the day at 2.8 by finger stick. If there are any questions please give them a call back.              OBJECTIVE    INR   Date Value Ref Range Status   02/05/2018 2.8  Corrected       ASSESSMENT / PLAN  INR assessment THER    Recheck INR In: 3 DAYS    INR Location [facility]      Anticoagulation Summary as of 2/5/2018     INR goal 2.0-3.0   Today's INR 2.8   Maintenance plan 5 mg (5 mg x 1) on Mon, Wed, Fri; 7.5 mg (5 mg x 1.5) all other days   Full instructions 5 mg on Mon, Wed, Fri; 7.5 mg all other days   Weekly total 45 mg   No change documented Janny Haddad, ASHLEY   Plan last modified Diana Castillo, RN (1/29/2018)   Next INR check 2/8/2018   Target end date Indefinite    Indications   Intermittent atrial fibrillation (H) [I48.0]  Long-term (current) use of anticoagulants [Z79.01] [Z79.01]         Anticoagulation Episode Summary     [facility]    Preferred lab     Send INR reminders to [facility] ANTICOAGULATION    Comments [facility]; new to  INR on 1/16/18- To clarify [facility] is different than Facility, ok to give Verbals for INR      Anticoagulation Care Providers     Provider Role Specialty Phone number    Azar Zaldivar MD [facility] Internal Medicine [phone]            See the Encounter Report to  view Anticoagulation Flowsheet and Dosing Calendar (Go to Encounters tab in chart review, and find the Anticoagulation Therapy Visit)    Dosage adjustment made based on physician directed care plan.      Janny Haddad RN

## 2018-02-05 NOTE — MR AVS SNAPSHOT
Bj CARROLL Kris   2/5/2018   Anticoagulation Therapy Visit    Description:  88 year old male   Provider:  Azar Zaldivar MD   Department:  [facility]           INR as of 2/5/2018     Today's INR 2.8      Anticoagulation Summary as of 2/5/2018     INR goal 2.0-3.0   Today's INR 2.8   Full instructions 5 mg on Mon, Wed, Fri; 7.5 mg all other days   Next INR check 2/8/2018    Indications   Intermittent atrial fibrillation (H) [I48.0]  Long-term (current) use of anticoagulants [Z79.01] [Z79.01]         Contact Numbers     Clinic Number:         February 2018 Details    Sun Mon Tue Wed Thu Fri Sat         1               2               3                 4               5      5 mg   See details      6      7.5 mg         7      5 mg         8            9               10                 11               12               13               14               15               16               17                 18               19               20               21               22               23               24                 25               26               27               28                   Date Details   02/05 This INR check       Date of next INR:  2/8/2018         How to take your warfarin dose     To take:  5 mg Take 1 of the 5 mg tablets.    To take:  7.5 mg Take 1.5 of the 5 mg tablets.

## 2018-02-05 NOTE — TELEPHONE ENCOUNTER
Spoke with RN and provided dosing instructions based on INR results for patient.  See Anticoagulation Encounter from 2/5/18 for more details.    Janny Haddad RN

## 2018-02-06 NOTE — TELEPHONE ENCOUNTER
Please call patient re echo just done.  It looks about the same as the one in [place].  His heart function is a little but reduced but nothing serious.  We will treat this with medications so I want to raise the metoprolol dose to 50mg bid and riase the torsemide dose to 20mg daily and see patient back in follow up on the 19th as he has scheduled.  If any problems before call    Thanks    Azar Zaldivar M.D.

## 2018-02-06 NOTE — TELEPHONE ENCOUNTER
aspirin EC 81 MG EC tablet  ; losartan (COZAAR) 100 MG tablet  ; metoprolol tartrate (LOPRESSOR) 25 MG tablet  Last Written Prescription Date:  ?  Last Fill Quantity: ?,   # refills: ?  Last Office Visit: 1/25/2018  Future Office visit:    Next 5 appointments (look out 90 days)     Feb 19, 2018 11:30 AM CST   Office Visit with Azar [facility]aldivar MD   [facility]    [address] [facility] [address]   [phone]                   Routing refill request to provider for review/approval because:  Medication is reported/historical

## 2018-02-06 NOTE — TELEPHONE ENCOUNTER
Reason for Call:  Medication or medication refill:    Do you use a [facility]?  Name of the pharmacy and phone number for the current request:       [facility] [id number] [place] [address]      Name of the medication requested: Asprin 81mg, Cozar 100mg, Metoprolol Succ. 25mg 1BID    Other request: none    Can we leave a detailed message on this number? YES    Phone number patient can be reached at: [phone] (Mireya) from [facility]    Best Time: anytime    Call taken on 2/6/2018 at 9:43 AM by Fidel Ramirez

## 2018-02-07 NOTE — TELEPHONE ENCOUNTER
Called and spoke with patient    Updated on plan below.     Pt verbalized understanding of medication changes with Read-back.     Advised to notify clinic with symptoms of low blood pressure (lightheaded/dizziness), and any other questions or concerns, but otherwise follow up with PCP on the 19th as scheduled.     Krystle ERNANDEZ RN

## 2018-02-08 NOTE — TELEPHONE ENCOUNTER
Yes,    If pulse under 50 hold that dose of metoprolol    If blood pressure under 100 call    Azar Zaldivar M.D.

## 2018-02-08 NOTE — TELEPHONE ENCOUNTER
Sorry, but I am confused on what the issue is, what is he taking and what do they have listed that he should be taking    Thanks    Azar Zaldivar M.D.

## 2018-02-08 NOTE — MR AVS SNAPSHOT
Bj CARROLL Kris   2/8/2018   Anticoagulation Therapy Visit    Description:  88 year old male   Provider:  Azar Zaldivar MD   Department:  [facility]           INR as of 2/8/2018     Today's INR 2.9      Anticoagulation Summary as of 2/8/2018     INR goal 2.0-3.0   Today's INR 2.9   Full instructions 5 mg every day   Next INR check 2/15/2018    Indications   Intermittent atrial fibrillation (H) [I48.0]  Long-term (current) use of anticoagulants [Z79.01] [Z79.01]         February 2018 Details    Sun Mon Tue Wed Thu Fri Sat         1               2               3                 4               5               6               7               8      5 mg   See details      9      5 mg         10      5 mg           11      5 mg         12      5 mg         13      5 mg         14      5 mg         15            16               17                 18               19               20               21               22               23               24                 25               26               27               28                   Date Details   02/08 This INR check       Date of next INR:  2/15/2018         How to take your warfarin dose     To take:  5 mg Take 1 of the 5 mg tablets.

## 2018-02-08 NOTE — TELEPHONE ENCOUNTER
Reason for Call:  INR    Who is calling?  [facility]: [facility]    Phone number:  [phone]    Fax number:      Name of caller: Deb    INR Value:  2.9 finger stick    Are there any other concerns:  Yes: Clarification needed- Pt's dosing for metoprolol tartrate (LOPRESSOR) 25 mg tablets  Changed from 1 tablet twice a day to 2 tablet twice a day but new Rx does not state this.  Deb with Pt now [phone]        Can we leave a detailed message on this number? YES    Phone number patient can be reached at: Home number on file [phone] (home)      Call taken on 2/8/2018 at 11:37 AM by Conchita Chakraborty

## 2018-02-08 NOTE — PROGRESS NOTES
ANTICOAGULATION FOLLOW-UP CLINIC VISIT    Patient Name:  Bj Ponce  Date:  2/8/2018  Contact Type:  Telephone    SUBJECTIVE:     Patient Findings     Positives No Problem Findings           OBJECTIVE    INR   Date Value Ref Range Status   02/08/2018 2.9  Final       ASSESSMENT / PLAN  INR assessment THER    Recheck INR In: 1 WEEK    [facility]      Anticoagulation Summary as of 2/8/2018     INR goal 2.0-3.0   Today's INR 2.9   Maintenance plan 5 mg (5 mg x 1) every day   Full instructions 5 mg every day   Weekly total 35 mg   Plan last modified Prachi Olsen RN (2/8/2018)   Next INR check 2/15/2018   Target end date Indefinite    Indications   Intermittent atrial fibrillation (H) [I48.0]  Long-term (current) use of anticoagulants [Z79.01] [Z79.01]         Anticoagulation Episode Summary     [facility]    Preferred lab     Send INR reminders to [facility] ANTICOAGULATION    Comments [facility]; new to [facility] on 1/16/18- To clarify [facility] is different than Facility, ok to give Verbals for INR      Anticoagulation Care Providers     Provider Role Specialty Phone number    Azar Zaldivar MD Responsible Internal Medicine [phone]            See the Encounter Report to view Anticoagulation Flowsheet and Dosing Calendar (Go to Encounters tab in chart review, and find the Anticoagulation Therapy Visit)    Dosage adjustment made based on physician directed care plan. Begin 5 mg daily and recheck in 1 week.  ASHLEY Ricci RN

## 2018-02-08 NOTE — TELEPHONE ENCOUNTER
Lots of confusion about medication dose that patient STATES he is supposed to take vs.what the nurse shows on med list ([facility])    (copied below from recent telephone encounter and I have updated med list based on below)    Please call patient re echo just done.  It looks about the same as the one in [place].  His heart function is a little but reduced but nothing serious.  We will treat this with medications so I want to raise the metoprolol dose to 50mg bid and riase the torsemide dose to 20mg daily and see patient back in follow up on the 19th as he has scheduled.  If any problems before call     Thanks     Azar Zaldivar M.D.  ------  INR dosing done.  Nurse reports /74  And Pulse 54 today.  Will recheck on Monday.  LINCOLN Olsen RN

## 2018-02-08 NOTE — TELEPHONE ENCOUNTER
I called Deb and spoke with her    Clarification in regard to meds:   1.) Patient was taking Metoprolol 25 mg twice daily. As of yesterday, patient states he rec'd phone call to increase Metoprolol to 50 mg twice daily. Patient started that yesterday    2.) Patient was taking Torsemide 10 mg daily. As of this morning, patient has started taking Torsemide 20 mg     Symptoms to report coming from Deb:  1.) since starting the increase dose of Metoprolol (as of yesterday) she has noticed lower BP and pulse   HR usually runs in the 60s and TODAY HR was recorded at 54 and also 48 at one point.    BPs usually run at 120s/60s, but again today BP was low at 110/74    Wondering if increase in Metoprolol could be causing this?    Janny Haddad RN

## 2018-02-12 NOTE — TELEPHONE ENCOUNTER
Reason for Call:  Other call back and prescription    Detailed comments: Mireya is calling from [facility] stating that updated torsemide rx was never sent to pt's pharmacy for refill. Please send. [facility] [address]  torsemide (DEMADEX) 10 MG tablet    Phone Number Patient can be reached at: Other phone number:  [phone]    Best Time: any    Can we leave a detailed message on this number? Not Applicable    Call taken on 2/12/2018 at 4:47 PM by Amalia Solis

## 2018-02-13 NOTE — TELEPHONE ENCOUNTER
Spoke with Deb from [facility] with update.     She will advise patient to hold LOSARTAN if SBP <90, not metoprolol. Hold Metoprolol only if Heart Rate is less than 50 per notes from previous encounter.     Krystle ERNANDEZ RN

## 2018-02-13 NOTE — TELEPHONE ENCOUNTER
Pt was told to call if his BP goes below 100. Last night BP was 92/56, heart rate was 84.  Deb with [facility] said Pt called last night to report this and was told to hold his   metoprolol tartrate (LOPRESSOR) 25mg tablet. Pt took BP this morning -119/56, heart rate 80.  Should Pt's metoprolol dosage be changed.  Please call Deb from [facility]  [phone]

## 2018-02-15 NOTE — TELEPHONE ENCOUNTER
Reason for Call:  INR    Who is calling?  Home Care: [facility]    Phone number:  Ph. [phone]     Fax number:      Name of caller: Marion    INR Value:  1.6    Are there any other concerns:  No  HC nurse states pt is taking losartan (COZAAR) 100 MG tablet at bed time, HC nurse would like to know if that's okay or if he should be taking at bed time       Can we leave a detailed message on this number? YES      Call taken on 2/15/2018 at 11:03 AM by Savana Valdivia

## 2018-02-15 NOTE — MR AVS SNAPSHOT
Bj Ponce   2/15/2018   Anticoagulation Therapy Visit    Description:  88 year old male   Provider:  Azar Zaldivar MD   Department:  [facility] Nurse           INR as of 2/15/2018     Today's INR 1.6!      Anticoagulation Summary as of 2/15/2018     INR goal 2.0-3.0   Today's INR 1.6!   Full instructions 2/15: 7.5 mg; 2/16: 7.5 mg; 2/17: 7.5 mg; 2/18: 5 mg   Next INR check 2/19/2018    Indications   Intermittent atrial fibrillation (H) [I48.0]  Long-term (current) use of anticoagulants [Z79.01] [Z79.01]         Description     Call to [facility] ASHLEY Schultz, INR 1.6 today.  Denies any changes except increase in Metoprolol and Torsemide doses. No interaction changes with Warfarin.  States he is seen by Home care twice weekly and they set up Warfarin and he has not missed any doses.  No other changes.    OF NOTE: Dose was decreased last encounter when INR still in range at 2.9.      Contact Numbers     Clinic Number:         February 2018 Details    Sun Mon Tue Wed Thu Fri Sat         1               2               3                 4               5               6               7               8               9               10                 11               12               13               14               15      7.5 mg   See details      16      7.5 mg         17      7.5 mg           18      5 mg         19            20               21               22               23               24                 25               26               27               28                   Date Details   02/15 This INR check       Date of next INR:  2/19/2018         How to take your warfarin dose     To take:  5 mg Take 1 of the 5 mg tablets.    To take:  7.5 mg Take 1.5 of the 5 mg tablets.

## 2018-02-15 NOTE — TELEPHONE ENCOUNTER
Call back to Marion [phone].  INR 1.6.  See Anticoagulation Encounter.  Tish Crisostomo RN

## 2018-02-15 NOTE — TELEPHONE ENCOUNTER
Marion calling back would like to speak with a nurse regarding INR. No nurse available at time of call.

## 2018-02-19 PROBLEM — I42.9 CARDIOMYOPATHY (H): Status: ACTIVE | Noted: 2017-01-01

## 2018-02-19 NOTE — MR AVS SNAPSHOT
After Visit Summary   2/19/2018    Bj Ponce    MRN: [id number]           Patient Information     Date Of Birth          10/6/1929        Visit Information        Provider Department      2/19/2018 11:30 AM Azar Zaldivar MD [facility]        Today's Diagnoses     CKD (chronic kidney disease) stage 4, GFR 15-29 ml/min (H)    -  1    Dilated cardiomyopathy (H)        Cerebrovascular accident (CVA) due to embolism of middle cerebral artery, unspecified blood vessel laterality (H)        Intermittent atrial fibrillation (H)        PVD (peripheral vascular disease) with claudication (H)        ASCVD (arteriosclerotic cardiovascular disease)        Benign essential hypertension        Long-term (current) use of anticoagulants [Z79.01]        Carbuncle of left leg          Care Instructions    1. Please call here with the exact medicines and doses    2. Start the new prescription antibiotic for the red ankle.  I would also use heat on the area 3 to 4x a day for 15 minutes.  Let me know if the redness is not improving soon    3. Call if you do get shortness of breath, chest pains or increasing weight/leg swellling    4. Follow up with me in 4 weeks    Azar Zaldivar M.D.            Follow-ups after your visit        Who to contact     If you have questions or need follow up information about today's clinic visit or your schedule please contact [facility] directly at [phone].  Normal or non-critical lab and imaging results will be communicated to you by MyChart, letter or phone within 4 business days after the clinic has received the results. If you do not hear from us within 7 days, please contact the clinic through MyChart or phone. If you have a critical or abnormal lab result, we will notify you by phone as soon as possible.  Submit refill requests through [employer] or call your pharmacy and they will forward the refill request to us. Please allow 3 business days for  "your refill to be completed.          Additional Information About Your Visit        "[employer] Information     [employer] lets you send messages to your doctor, view your test results, renew your prescriptions, schedule appointments and more. To sign up, go to [email or web address] . Click on \"Log in\" on the left side of the screen, which will take you to the Welcome page. Then click on \"Sign up Now\" on the right side of the page.     You will be asked to enter the access code listed below, as well as some personal information. Please follow the directions to create your username and password.     Your access code is: [email or web address]  Expires: 2018 12:00 PM     Your access code will  in 90 days. If you need help or a new code, please call your [place] clinic or [phone].        Care EveryWhere ID     This is your Care EveryWhere ID. This could be used by other organizations to access your [place] medical records  [id number]        Your Vitals Were     Pulse Temperature Height Pulse Oximetry BMI (Body Mass Index)       69 97.6  F (36.4  C) (Oral) 5' 9\" (1.753 m) 95% 18.53 kg/m2        Blood Pressure from Last 3 Encounters:   18 128/68   18 126/58   18 118/76    Weight from Last 3 Encounters:   18 125 lb 8 oz (56.9 kg)   18 133 lb (60.3 kg)   18 149 lb (67.6 kg)              We Performed the Following     Basic metabolic panel     INR          Today's Medication Changes          These changes are accurate as of 18 12:04 PM.  If you have any questions, ask your nurse or doctor.               Start taking these medicines.        Dose/Directions    cephALEXin 500 MG capsule   Commonly known as:  KEFLEX   Used for:  Carbuncle of left leg   Started by:  Azar Zaldivar MD        Dose:  500 mg   Take 1 capsule (500 mg) by mouth 2 times daily   Quantity:  14 capsule   Refills:  0            Where to get your medicines      These medications were sent to [facility] [id number] [place] " [facility] - [place], [place] - [address]  [address] [address], [facility] [address]     Phone:  [phone]     cephALEXin 500 MG capsule                Primary Care Provider Office Phone # Fax #    Azar Zaldivar [phone] [address] [facility] [address]  [facility] [address]        Equal Access to Services     [facility] CenterSTEFANIE : Hadii aad ku hadasho Soomaali, waaxda luqadaha, qaybta kaalmada adeegyada, waxay idiin hayaan adeeg kharash la'aan ah. So [facility] [phone].    ATENCIÓN: Si habla espephraim, tiene a clifford disposición servicios gratuitos de asistencia lingüística. Kenny al [phone].    We comply with applicable federal civil rights laws and [place] laws. We do not discriminate on the basis of race, color, national origin, age, disability, sex, sexual orientation, or gender identity.            Thank you!     Thank you for choosing [facility]  for your care. Our goal is always to provide you with excellent care. Hearing back from our patients is one way we can continue to improve our services. Please take a few minutes to complete the written survey that you may receive in the mail after your visit with us. Thank you!             Your Updated Medication List - Protect others around you: Learn how to safely use, store and throw away your medicines at www.disposemymeds.org.          This list is accurate as of 2/19/18 12:04 PM.  Always use your most recent med list.                   Brand Name Dispense Instructions for use Diagnosis    aspirin EC 81 MG EC tablet     90 tablet    Take 1 tablet (81 mg) by mouth daily    Cerebrovascular accident (CVA), unspecified mechanism (H)       cephALEXin 500 MG capsule    KEFLEX    14 capsule    Take 1 capsule (500 mg) by mouth 2 times daily    Carbuncle of left leg       clotrimazole 1 % cream    LOTRIMIN     Apply topically 2 times daily Apply to feet and between toes    Cerebrovascular accident (CVA), unspecified mechanism (H)       losartan  100 MG tablet    COZAAR    90 tablet    Take 1 tablet (100 mg) by mouth daily    Cerebrovascular accident (CVA), unspecified mechanism (H)       melatonin 3 MG tablet      Take 3 mg by mouth At Bedtime    Cerebrovascular accident (CVA), unspecified mechanism (H)       metoprolol tartrate 25 MG tablet    LOPRESSOR    120 tablet    Take 2 tablets (50 mg) by mouth 2 times daily    Cerebrovascular accident (CVA), unspecified mechanism (H)       sertraline 50 MG tablet    ZOLOFT    30 tablet    Take 1 tablet (50 mg) by mouth daily    Anxiety       torsemide 20 MG tablet    DEMADEX    90 tablet    Take 1 tablet (20 mg) by mouth daily    Benign essential hypertension       warfarin 2.5 MG tablet    COUMADIN    90 tablet    Take 1 tablet (2.5 mg) by mouth daily OR AS INSTRUCTED BY INR  CLINIC    Cerebrovascular accident (CVA), unspecified mechanism (H)

## 2018-02-19 NOTE — LETTER
[facility]  [address] [place] [address] [address] [facility]  [address]  [place]  [address]  Tel: [phone]    February 20, 2018    Bj Ponce  [address]   [place] [address]        Dear Mr. Ponce,    Your chemistries are very stable.  The kidney test is a bit lower which is good and everything else is fine.    If you have any further questions or problems, please contact our office.      Sincerely,    Azar Zaldivar MD/ Eduarda Farah CMA  Results for orders placed or performed in visit on 02/19/18   INR   Result Value Ref Range    INR 2.00 (H) 0.86 - 1.14   Basic metabolic panel   Result Value Ref Range    Sodium 138 133 - 144 mmol/L    Potassium 4.7 3.4 - 5.3 mmol/L    Chloride 103 94 - 109 mmol/L    Carbon Dioxide 28 20 - 32 mmol/L    Anion Gap 7 3 - 14 mmol/L    Glucose 119 (H) 70 - 99 mg/dL    Urea Nitrogen 64 (H) 7 - 30 mg/dL    Creatinine 2.09 (H) 0.66 - 1.25 mg/dL    GFR Estimate 30 (L) >60 mL/min/1.7m2    GFR Estimate If Black 36 (L) >60 mL/min/1.7m2    Calcium 9.2 8.5 - 10.1 mg/dL               Enclosure: Lab Results

## 2018-02-19 NOTE — PROGRESS NOTES
ANTICOAGULATION FOLLOW-UP CLINIC VISIT    Patient Name:  Bj Ponce  Date:  2/19/2018  Contact Type:  Telephone/ Homecare  [place] with [facility]     SUBJECTIVE:        OBJECTIVE    INR   Date Value Ref Range Status   02/19/2018 2.00 (H) 0.86 - 1.14 Final     Comment:     Performed at Point of Care       ASSESSMENT / PLAN  INR assessment THER    Recheck INR In: 1 WEEK    INR Location Clinic      Anticoagulation Summary as of 2/19/2018     INR goal 2.0-3.0   Today's INR 2.00   Maintenance plan 5 mg (5 mg x 1) on Tue, Thu, Sat; 7.5 mg (5 mg x 1.5) all other days   Full instructions 5 mg on Tue, Thu, Sat; 7.5 mg all other days   Weekly total 45 mg   Plan last modified Diana Castillo RN (2/19/2018)   Next INR check 2/26/2018   Target end date Indefinite    Indications   Intermittent atrial fibrillation (H) [I48.0]  Long-term (current) use of anticoagulants [Z79.01] [Z79.01]         Anticoagulation Episode Summary     INR check location [facility]    Preferred lab     Send INR reminders to [facility] ANTICOAGULATION    Comments [facility]; new to [facility] INR on 1/16/18- To clarify [facility] is different than Facility, ok to give Verbals for INR  Mireya at [facility]  Ph. [phone]       Anticoagulation Care Providers     Provider Role Specialty Phone number    Azar Zaldivar MD [facility] Internal Medicine [phone]            See the Encounter Report to view Anticoagulation Flowsheet and Dosing Calendar (Go to Encounters tab in chart review, and find the Anticoagulation Therapy Visit)    Dosage adjustment made based on physician directed care plan.  Called changed orders/verbal to Mireya at [facility]    Ph. [phone]     Diana Castillo RN

## 2018-02-19 NOTE — MR AVS SNAPSHOT
Bj CARROLL Kris   2/19/2018   Anticoagulation Therapy Visit    Description:  88 year old male   Provider:  Azar Zaldivar MD   Department:  [facility]           INR as of 2/19/2018     Today's INR 2.00      Anticoagulation Summary as of 2/19/2018     INR goal 2.0-3.0   Today's INR 2.00   Full instructions 5 mg on Tue, Thu, Sat; 7.5 mg all other days   Next INR check 2/26/2018    Indications   Intermittent atrial fibrillation (H) [I48.0]  Long-term (current) use of anticoagulants [Z79.01] [Z79.01]         February 2018 Details    Sun Mon Tue Wed Thu Fri Sat         1               2               3                 4               5               6               7               8               9               10                 11               12               13               14               15               16               17                 18               19      7.5 mg   See details      20      5 mg         21      7.5 mg         22      5 mg         23      7.5 mg         24      5 mg           25      7.5 mg         26            27               28                   Date Details   02/19 This INR check       Date of next INR:  2/26/2018         How to take your warfarin dose     To take:  5 mg Take 1 of the 5 mg tablets.    To take:  7.5 mg Take 1.5 of the 5 mg tablets.

## 2018-02-19 NOTE — NURSING NOTE
"Chief Complaint   Patient presents with     RECHECK       Initial /68 (BP Location: Left arm, Patient Position: Chair, Cuff Size: Adult Regular)  Pulse 69  Temp 97.6  F (36.4  C) (Oral)  Ht 5' 9\" (1.753 m)  Wt 125 lb 8 oz (56.9 kg)  SpO2 95%  BMI 18.53 kg/m2 Estimated body mass index is 18.53 kg/(m^2) as calculated from the following:    Height as of this encounter: 5' 9\" (1.753 m).    Weight as of this encounter: 125 lb 8 oz (56.9 kg).  Medication Reconciliation: complete.  Mikaela Cesar CMA    "

## 2018-02-19 NOTE — PATIENT INSTRUCTIONS
1. Please call here with the exact medicines and doses    2. Start the new prescription antibiotic for the red ankle.  I would also use heat on the area 3 to 4x a day for 15 minutes.  Let me know if the redness is not improving soon    3. Call if you do get shortness of breath, chest pains or increasing weight/leg swellling    4. Follow up with me in 4 weeks    Azar Zaldivar M.D.

## 2018-02-19 NOTE — PROGRESS NOTES
The patient is here with his daughter who lives in [place] for follow-up multiple medical problems.    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  He is now on Coumadin doing well without any recurrent neurologic symptoms.    During that hospitalization he was found to have a cardiomyopathy.  This was confirmed with a recent follow-up cardiac echo was noted and reviewed with the patient and his daughter.    The patient has chronic kidney disease, stage IV, for quite some time.  His last creatinine was reviewed.    The patient has intermittent atrial fibrillation, for which she now takes Coumadin.    The patient has peripheral vascular disease with prior claudication.  He is not having symptoms currently.  No sores on his legs.    The patient has a red lesion on his left lower leg.  The daughter just noticed it.  Patient has not noticed it.  It is tender to the touch.  He otherwise does not feel pain down there.  No fevers.    At the last office visit he was having significant anxiety with insomnia for which I started Zoloft.  This is worked great.  He is not having side effects and is sleeping very well.  He does not have anxiety.    Unfortunately the patient does not have a medication list with him and neither he nor his daughter really know with her on.  I stressed to him that he needs to bring a medication list that is accurate and up-to-date at each office visit.    The patient has no HEENT, cardiovascular, respiratory,  symptoms.  He was having some recent diarrhea but this seems to be improving.  He lives at [facility].  He does have a nurse coming to set out his meds.    Past Medical History:   Diagnosis Date     afib 2004, 2010, 2012    post op and then again 2010, 2012     ASCVD (arteriosclerotic cardiovascular disease) 2004    angio for cp and 2 vessel dz, cabg done 2004, post op afib     Cardiomyopathy  (H) 11/2017    found on echo done during cvi in [facility]; echo 1/18 here with ef 38%, global hypokinesis     CKD (chronic kidney disease) stage 4, GFR 15-29 ml/min (H)      CVA (cerebral vascular accident) (H) 11/25/2017    L MCA embolism, received TPA. No residual deficits, add coumadin     Genital herpes      HTN (hypertension)      Hypercholesteremia      Pseudogout      PVD (peripheral vascular disease) with claudication (H)      Past Surgical History:   Procedure Laterality Date     CATARACT IOL, RT/LT  2006     CORONARY ARTERY BYPASS  2004     HYDROCELECTOMY SCROTAL  1997     TONSILLECTOMY       TURP  1997     Social History     Social History     Marital status:      Spouse name: N/A     Number of children: 3     Years of education: N/A     Occupational History           Social History Main Topics     Smoking status: Former Smoker     Quit date: 4/16/1954     Smokeless tobacco: Never Used     Alcohol use No     Drug use: No     Sexual activity: Not Currently     Other Topics Concern     Not on file     Social History Narrative     Current Outpatient Prescriptions   Medication Sig Dispense Refill     cephALEXin (KEFLEX) 500 MG capsule Take 1 capsule (500 mg) by mouth 2 times daily 14 capsule 0     sertraline (ZOLOFT) 50 MG tablet Take 1 tablet (50 mg) by mouth daily 90 tablet 1     torsemide (DEMADEX) 20 MG tablet Take 1 tablet (20 mg) by mouth daily 90 tablet 3     metoprolol tartrate (LOPRESSOR) 25 MG tablet Take 2 tablets (50 mg) by mouth 2 times daily 120 tablet 3     losartan (COZAAR) 100 MG tablet Take 1 tablet (100 mg) by mouth daily 90 tablet 3     aspirin EC 81 MG EC tablet Take 1 tablet (81 mg) by mouth daily 90 tablet 3     melatonin 3 MG tablet Take 3 mg by mouth At Bedtime       clotrimazole (LOTRIMIN) 1 % cream Apply topically 2 times daily Apply to feet and between toes       warfarin (COUMADIN) 2.5 MG tablet Take 1 tablet (2.5 mg) by mouth daily OR AS INSTRUCTED BY INR   "CLINIC 90 tablet 0     [DISCONTINUED] sertraline (ZOLOFT) 50 MG tablet Take 1 tablet (50 mg) by mouth daily 30 tablet 1     No Known Allergies  FAMILY HISTORY NOTED AND REVIEWED    REVIEW OF SYSTEMS: above    PHYSICAL EXAM    /68 (BP Location: Left arm, Patient Position: Chair, Cuff Size: Adult Regular)  Pulse 69  Temp 97.6  F (36.4  C) (Oral)  Ht 5' 9\" (1.753 m)  Wt 125 lb 8 oz (56.9 kg)  SpO2 95%  BMI 18.53 kg/m2    Patient appears non toxic  Lungs clear  cv irreg irreg, 2/6 sm, no jvp or edema  Legs no edema, has one raised, red area, tender, about 3x3cm, no spreading redness    Labs sent    ASSESSMENT:  1. Dilated cmyop, stable, discussed with patient need to call for shortness of breath, edema.  He will get me med list  2. Ckd, follow up labs  3. Cvi, stable, embolic, on com  4. Afib, rate controlled, on coum  5. Pvd, stable  6. Ascvd, stable, consider adding statin, watch lft's with that  7. Carbuncle  8. Anxiety, much improved    PLAN:  Keflex 500mg bid for 7 days, heat, call if worsens, not gone soon  Call with med list  Call if shortness of breath, weight gain  Follow up 4 weeks  Labs    Azar Zaldivar M.D.    I received fax from [facility] 2/22/18 and the meds listed are correct.    Azar Zaldivar M.D.    "

## 2018-02-20 NOTE — TELEPHONE ENCOUNTER
"Requested Prescriptions   Pending Prescriptions Disp Refills     warfarin (COUMADIN) 2.5 MG tablet [Pharmacy Med Name: WARFARIN SODIUM 2.5 MG TABLET] 90 tablet 0     Sig: TAKE 1 TABLET (2.5 MG) BY MOUTH DAILY OR AS INSTRUCTED BY INR CLINIC    Vitamin K Antagonists Failed    2/20/2018 10:56 AM       Failed - INR is within goal in the past 6 weeks    Confirm INR is within goal in the past 6 weeks.     Recent Labs   Lab Test  02/19/18   1210   INR  2.00*                      Passed - Recent or future visit with authorizing provider's specialty    Patient had office visit in the last year or has a visit in the next 30 days with authorizing provider.  See \"Patient Info\" tab in inbasket, or \"Choose Columns\" in Meds & Orders section of the refill encounter.            Passed - Patient is 18 years of age or older        Last Written Prescription Date:  01/16/18  Last Fill Quantity: 90,  # refills: 0   Last office visit: 2/19/2018 with prescribing provider:     Future Office Visit:        Savana Neal MA    "

## 2018-02-20 NOTE — TELEPHONE ENCOUNTER
Anticoagulation Monitoring Instructions   Latest Ref Rng & Units    2/19/2018 5 mg on Tue, Thu, Sat; 7.5 mg all other days   Prescription approved per [facility] Refill Protocol.  Kate Lowry RN

## 2018-02-20 NOTE — PROGRESS NOTES
It was a pleasure seeing you.  I wanted to get back to you with your test results.  I have enclosed a copy for your records.    Your chemistries are very stable.  The kidney test is a bit lower which is good and everything else is fine.  If you have any questions please call me.

## 2018-02-26 NOTE — TELEPHONE ENCOUNTER
Reason for Call:  Request for results:    Name of test or procedure: INR results    Date of test of procedure: 02/26/2018    Location of the test or procedure: Pt's home    OK to leave the result message on voice mail or with a family member? YES    Phone number Patient can be reached at:  Other phone number:  [facility]: [phone]    Additional comments: Pt's home care nurse called this morning and they wanted to report the pt's INR at 2.7 for today 02/26/2018 .If there are any questions please feel free to contact them. Thank you.     Call taken on 2/26/2018 at 10:59 AM by Sarah Villar

## 2018-02-26 NOTE — TELEPHONE ENCOUNTER
Reason for Call:  Other prescription    Detailed comments: Pt needs a refill on his warfarin.    Phone Number Patient can be reached at: Other phone number:  [facility]- [phone]    Best Time:     Can we leave a detailed message on this number? YES    Call taken on 2/26/2018 at 11:01 AM by Sarah Villar

## 2018-03-05 NOTE — PROGRESS NOTES
ANTICOAGULATION FOLLOW-UP CLINIC VISIT    Patient Name:  Bj Ponce  Date:  3/5/2018  Contact Type:  Telephone/ Spoke with [facility] RN and provided dosing instruction and recheck date.     SUBJECTIVE:     Patient Findings     Positives No Problem Findings           OBJECTIVE    INR   Date Value Ref Range Status   03/05/2018 2.6  Final       ASSESSMENT / PLAN  INR assessment THER    Recheck INR In: 1 WEEK    INR Location Homecare INR      Anticoagulation Summary as of 3/5/2018     INR goal 2.0-3.0   Today's INR 2.6   Maintenance plan 5 mg (5 mg x 1) on Tue, Thu, Sat; 7.5 mg (5 mg x 1.5) all other days   Full instructions 5 mg on Tue, Thu, Sat; 7.5 mg all other days   Weekly total 45 mg   No change documented Janny Haddad RN   Plan last modified Diana Castillo RN (2/19/2018)   Next INR check 3/12/2018   Target end date Indefinite    Indications   Intermittent atrial fibrillation (H) [I48.0]  Long-term (current) use of anticoagulants [Z79.01] [Z79.01]         Anticoagulation Episode Summary     INR check location [facility]    Preferred lab     Send INR reminders to [facility] ANTICOAGULATION    Comments [facility]; new to  INR on 1/16/18- To clarify [facility] is different than Facility, ok to give Verbals for INR  Mireya at [facility]  Ph. [phone]       Anticoagulation Care Providers     Provider Role Specialty Phone number    Azar Zaldivar MD [facility] Internal Medicine [phone]            See the Encounter Report to view Anticoagulation Flowsheet and Dosing Calendar (Go to Encounters tab in chart review, and find the Anticoagulation Therapy Visit)    Dosage adjustment made based on physician directed care plan.      Janny Haddad, RN

## 2018-03-05 NOTE — MR AVS SNAPSHOT
Bj CARROLL Kris   3/5/2018   Anticoagulation Therapy Visit    Description:  88 year old male   Provider:  Azar Zaldivar MD   Department:  [facility]           INR as of 3/5/2018     Today's INR 2.6      Anticoagulation Summary as of 3/5/2018     INR goal 2.0-3.0   Today's INR 2.6   Full instructions 5 mg on Tue, Thu, Sat; 7.5 mg all other days   Next INR check 3/12/2018    Indications   Intermittent atrial fibrillation (H) [I48.0]  Long-term (current) use of anticoagulants [Z79.01] [Z79.01]         Contact Numbers     Clinic Number:         March 2018 Details    Sun Mon Tue Wed Thu Fri Sat         1               2               3                 4               5      7.5 mg   See details      6      5 mg         7      7.5 mg         8      5 mg         9      7.5 mg         10      5 mg           11      7.5 mg         12            13               14               15               16               17                 18               19               20               21               22               23               24                 25               26               27               28               29               30               31                Date Details   03/05 This INR check       Date of next INR:  3/12/2018         How to take your warfarin dose     To take:  5 mg Take 1 of the 5 mg tablets.    To take:  7.5 mg Take 1.5 of the 5 mg tablets.

## 2018-03-06 NOTE — TELEPHONE ENCOUNTER
Per 2/6/18 TE, metoprolol dose was changed, chart was updated but new Rx was not sent to pharmacy.    Pharmacy now requesting new Rx.    30 days with 3 refills pended.    RT George (R)     51 year old obese female from home live alone with PMH of HTN, HLD, DM, obesity, hypothyroidism, fibromyalgia who had recent admission in september for chest pain pw non exertional chest pain and palpitation for 4 hour on and off which later accompanied by dizziness, left sided arm numbness.

## 2018-03-07 NOTE — TELEPHONE ENCOUNTER
"Requested Prescriptions   Pending Prescriptions Disp Refills     metoprolol tartrate (LOPRESSOR) 25 MG tablet 120 tablet 3     Sig: Take 2 tablets (50 mg) by mouth 2 times daily    Beta-Blockers Protocol Passed    3/7/2018 10:16 AM       Passed - Blood pressure under 140/90 in past 12 months    BP Readings from Last 3 Encounters:   02/19/18 128/68   01/25/18 126/58   01/16/18 118/76                Passed - Patient is age 6 or older       Passed - Recent (12 mo) or future (30 days) visit within the authorizing provider's specialty    Patient had office visit in the last year or has a visit in the next 30 days with authorizing provider.  See \"Patient Info\" tab in inbasket, or \"Choose Columns\" in Meds & Orders section of the refill encounter.               Prescription approved per [facility].    Future Office Visit:   Next 5 appointments (look out 90 days)     Mar 27, 2018 11:00 AM CDT   Office Visit with Azar Zaldivar MD   [facility]    [address] [address]   [phone]                   Alicia Manjarrez, BS, RN, PHN        "

## 2018-03-12 NOTE — TELEPHONE ENCOUNTER
Reason for Call:  INR    Who is calling?  Home Care: [facility]    Phone number:  [phone]    Fax number:      Name of caller: Annalise    INR Value:  2.4- finger stick    Are there any other concerns:  No, Pt is being discharged from [facility] and  Will need to come in to clinic for his INR's. Pt will call to schedule    Can we leave a detailed message on this number? YES    Phone number patient can be reached at: Home number on file [phone] (home)      Call taken on 3/12/2018 at 10:28 AM by Conchita Chakraborty

## 2018-03-12 NOTE — TELEPHONE ENCOUNTER
See INR enct.  Called pt to dose and schedule, notified Annalise at homecare as well.  Diana Castillo RN

## 2018-03-12 NOTE — PROGRESS NOTES
ANTICOAGULATION FOLLOW-UP CLINIC VISIT    Patient Name:  Bj Ponce  Date:  3/12/2018  Contact Type:  Homecare, called to pt to schedule follow up for INR after Visit with itzel on 3/27/18  SUBJECTIVE:     Patient Findings     Positives No Problem Findings           OBJECTIVE    INR   Date Value Ref Range Status   03/12/2018 2.4  Final       ASSESSMENT / PLAN  INR assessment THER    Recheck INR In: 3 WEEKS    INR Location Homecare INR      Anticoagulation Summary as of 3/12/2018     INR goal 2.0-3.0   Today's INR 2.4   Maintenance plan 5 mg (5 mg x 1) on Tue, Thu, Sat; 7.5 mg (5 mg x 1.5) all other days   Full instructions 5 mg on Tue, Thu, Sat; 7.5 mg all other days   Weekly total 45 mg   No change documented Diana Castillo, RN   Plan last modified Diana Castillo, RN (2/19/2018)   Next INR check 3/27/2018   Target end date Indefinite    Indications   Intermittent atrial fibrillation (H) [I48.0]  Long-term (current) use of anticoagulants [Z79.01] [Z79.01]         Anticoagulation Episode Summary     [facility] check [facility]    Preferred lab     Send INR reminders to [facility] ANTICOAGULATION    Comments [facility]; new to  INR on 1/16/18- To clarify [facility] is different than Facility, ok to give Verbals for INR  Mireya at [facility]  Ph. [phone]       Anticoagulation Care Providers     Provider Role Specialty Phone number    Azar Zaldivar MD [facility] Internal Medicine [phone]            See the Encounter Report to view Anticoagulation Flowsheet and Dosing Calendar (Go to Encounters tab in chart review, and find the Anticoagulation Therapy Visit)    Dosage adjustment made based on physician directed care plan.    Diana Castillo, RN

## 2018-03-12 NOTE — MR AVS SNAPSHOT
Bj CARROLL Kris   3/12/2018   Anticoagulation Therapy Visit    Description:  88 year old male   Provider:  Azar Zaldivar MD   Department:  [facility]           INR as of 3/12/2018     Today's INR 2.4      Anticoagulation Summary as of 3/12/2018     INR goal 2.0-3.0   Today's INR 2.4   Full instructions 5 mg on Tue, Thu, Sat; 7.5 mg all other days   Next INR check 3/27/2018    Indications   Intermittent atrial fibrillation (H) [I48.0]  Long-term (current) use of anticoagulants [Z79.01] [Z79.01]         Your next Anticoagulation Clinic appointment(s)     Mar 27, 2018 11:45 AM CDT   Anticoagulation Visit with  ANTICOAGULATION CLINIC   [facility]    [address]  [place] [address]   [phone]              March 2018 Details    Sun Mon Tue Wed Thu Fri Sat         1               2               3                 4               5               6               7               8               9               10                 11               12      7.5 mg   See details      13      5 mg         14      7.5 mg         15      5 mg         16      7.5 mg         17      5 mg           18      7.5 mg         19      7.5 mg         20      5 mg         21      7.5 mg         22      5 mg         23      7.5 mg         24      5 mg           25      7.5 mg         26      7.5 mg         27            28               29               30               31                Date Details   03/12 This INR check       Date of next INR:  3/27/2018         How to take your warfarin dose     To take:  5 mg Take 1 of the 5 mg tablets.    To take:  7.5 mg Take 1.5 of the 5 mg tablets.

## 2018-03-27 PROBLEM — F41.9 ANXIETY: Status: ACTIVE | Noted: 2018-01-01

## 2018-03-27 NOTE — NURSING NOTE
"Chief Complaint   Patient presents with     RECHECK       Initial /62  Pulse 65  Temp 97.8  F (36.6  C) (Oral)  Ht 5' 9\" (1.753 m)  Wt 122 lb (55.3 kg)  SpO2 95%  BMI 18.02 kg/m2 Estimated body mass index is 18.02 kg/(m^2) as calculated from the following:    Height as of this encounter: 5' 9\" (1.753 m).    Weight as of this encounter: 122 lb (55.3 kg).  Medication Reconciliation: complete   Eduarda Farah CMA      "

## 2018-03-27 NOTE — PATIENT INSTRUCTIONS
Call me if things change.      See me in 2 months for a follow up office visit.    Azar Zaldivar M.D.

## 2018-03-27 NOTE — MR AVS SNAPSHOT
Bj Ponce   3/27/2018 11:45 AM   Anticoagulation Therapy Visit    Description:  88 year old male   Provider:   ANTICOAGULATION CLINIC   Department:   Nurse           INR as of 3/27/2018     Today's INR 3.6!      Anticoagulation Summary as of 3/27/2018     INR goal 2.0-3.0   Today's INR 3.6!   Full instructions 3/28: 5 mg; 4/4: 5 mg; Otherwise 5 mg on Tue, Thu, Sat; 7.5 mg all other days   Next INR check 4/10/2018    Indications   Intermittent atrial fibrillation (H) [I48.0]  Long-term (current) use of anticoagulants [Z79.01] [Z79.01]         Your next Anticoagulation Clinic appointment(s)     Mar 27, 2018 11:45 AM CDT   Anticoagulation Visit with  ANTICOAGULATION CLINIC   [facility]    [address]  [place] [address]   [phone]            Apr 10, 2018  9:45 AM CDT   Anticoagulation Visit with  ANTICOAGULATION CLINIC   [facility]    [address]  [place] [address]   [phone]              Contact Numbers     Clinic Number:         March 2018 Details    Sun Mon Tue Wed Thu Fri Sat         1               2               3                 4               5               6               7               8               9               10                 11               12               13               14               15               16               17                 18               19               20               21               22               23               24                 25               26               27      5 mg   See details      28      5 mg         29      5 mg         30      7.5 mg         31      5 mg          Date Details   03/27 This INR check               How to take your warfarin dose     To take:  5 mg Take 1 of the 5 mg tablets.    To take:  7.5 mg Take 1.5 of the 5 mg tablets.           April 2018 Details    Sun Mon Tue Wed Thu Fri Sat     1      7.5 mg         2      7.5 mg          3      5 mg         4      5 mg         5      5 mg         6      7.5 mg         7      5 mg           8      7.5 mg         9      7.5 mg         10            11               12               13               14                 15               16               17               18               19               20               21                 22               23               24               25               26               27               28                 29               30                     Date Details   No additional details    Date of next INR:  4/10/2018         How to take your warfarin dose     To take:  5 mg Take 1 of the 5 mg tablets.    To take:  7.5 mg Take 1.5 of the 5 mg tablets.

## 2018-03-27 NOTE — PROGRESS NOTES
The patient is here with his girlfriend to follow-up multiple medical problems.  His past medical history is noted.    The patient is doing very well.  Since he began on the Zoloft is really felt very well with no anxiety and sleeping much better.  However, his weight is down some is noted.  He states he feels great and eats very well.  He has absolutely no pain.  No headaches or jaw claudication.  No fevers or night sweats.  No cardiovascular, respiratory symptoms.  He has no abdominal pain or nausea or vomiting.  Occasionally he will have diarrhea that come and go.  It is not often.  No bloody or black stools.  He takes his medications regularly.  Prior labs were reviewed.    Past Medical History:   Diagnosis Date     afib 2004, 2010, 2012    post op and then again 2010, 2012     Anxiety 01/2018    added zoloft     ASCVD (arteriosclerotic cardiovascular disease) 2004    angio for cp and 2 vessel dz, cabg done 2004, post op afib     Cardiomyopathy (H) 11/2017    found on echo done during cvi in [facility]; echo 1/18 here with ef 38%, global hypokinesis     CKD (chronic kidney disease) stage 4, GFR 15-29 ml/min (H)      CVA (cerebral vascular accident) (H) 11/25/2017    L MCA embolism, received TPA. No residual deficits, add coumadin     Genital herpes      HTN (hypertension)      Hypercholesteremia      Pseudogout      PVD (peripheral vascular disease) with claudication (H)      Past Surgical History:   Procedure Laterality Date     CATARACT IOL, RT/LT  2006     CORONARY ARTERY BYPASS  2004     HYDROCELECTOMY SCROTAL  1997     TONSILLECTOMY       TURP  1997     Social History     Social History     Marital status:      Spouse name: N/A     Number of children: 3     Years of education: N/A     Occupational History           Social History Main Topics     Smoking status: Former Smoker     Quit date: 4/16/1954     Smokeless tobacco: Never Used     Alcohol use No     Drug use: No     Sexual  "activity: Not Currently     Other Topics Concern     Not on file     Social History Narrative     Current Outpatient Prescriptions   Medication Sig Dispense Refill     metoprolol tartrate (LOPRESSOR) 25 MG tablet Take 2 tablets (50 mg) by mouth 2 times daily 120 tablet 11     warfarin (COUMADIN) 2.5 MG tablet 2-3 tablets daily (5-7.5 mg) BY MOUTH DAILY OR AS INSTRUCTED BY INR CLINIC 180 tablet 0     sertraline (ZOLOFT) 50 MG tablet Take 1 tablet (50 mg) by mouth daily 90 tablet 1     torsemide (DEMADEX) 20 MG tablet Take 1 tablet (20 mg) by mouth daily 90 tablet 3     losartan (COZAAR) 100 MG tablet Take 1 tablet (100 mg) by mouth daily 90 tablet 3     aspirin EC 81 MG EC tablet Take 1 tablet (81 mg) by mouth daily 90 tablet 3     clotrimazole (LOTRIMIN) 1 % cream Apply topically 2 times daily Apply to feet and between toes       No Known Allergies  FAMILY HISTORY NOTED AND REVIEWED    REVIEW OF SYSTEMS: above    PHYSICAL EXAM    /62  Pulse 65  Temp 97.8  F (36.6  C) (Oral)  Ht 5' 9\" (1.753 m)  Wt 122 lb (55.3 kg)  SpO2 95%  BMI 18.02 kg/m2    Patient appears non toxic  Mouth - tongue midline and within normal limits, mucous membranes and posterior pharynx within normal limits, no lesions seen.  Neck - no masses, lesions or tenderness  Nodes - no supraclavicular, cervical or axially adenopathy .  Lungs - clear, normal flow  Cardiovascular - irregular rate and rhythm, no murmer, rub or gallop, no jvp or edema, carotids within normal limits, no bruits.  Abdomen - normal active bowel sounds, soft, non tender, no masses, guarding or rebound, no hepatosplenomegaly      Labs noted    ASSESSMENT:  1. Weight loss, doubt pathologic, will follow closely  2. ckd stage 4, stable  3. Cmyop, med mgmt, stable  4. Pvd, stable  5. Cvi, on coum  6. Afib, on coum  7. Hypertension, controlled  8. Anxiety ,insomnia, doing super    PLAN:  inr today  No change in meds  Call if problems  Follow up 2 months      Azar Zaldivar, " M.D.

## 2018-03-27 NOTE — PROGRESS NOTES
ANTICOAGULATION FOLLOW-UP CLINIC VISIT    Patient Name:  Bj Ponce  Date:  3/27/2018  Contact Type:  Face to Face    SUBJECTIVE:     Patient Findings     Positives Unexplained INR or factor level change    Comments Patient recently discharged from Home Care.   INR slightly SUPRA today.  Will decrease dose down on Wednesday from 7.5 mg to 5 mg. All other says stays same.   Will recheck in 2 weeks.              OBJECTIVE    INR Protime   Date Value Ref Range Status   03/27/2018 3.6 (A) 0.86 - 1.14 Final       ASSESSMENT / PLAN  INR assessment SUPRA    Recheck INR In: 2 WEEKS    INR Location Clinic      Anticoagulation Summary as of 3/27/2018     INR goal 2.0-3.0   Today's INR 3.6!   Maintenance plan 5 mg (5 mg x 1) on Tue, Thu, Sat; 7.5 mg (5 mg x 1.5) all other days   Full instructions 3/28: 5 mg; 4/4: 5 mg; Otherwise 5 mg on Tue, Thu, Sat; 7.5 mg all other days   Weekly total 45 mg   Plan last modified Diana Castillo RN (2/19/2018)   Next INR check 4/10/2018   Target end date Indefinite    Indications   Intermittent atrial fibrillation (H) [I48.0]  Long-term (current) use of anticoagulants [Z79.01] [Z79.01]         Anticoagulation Episode Summary     INR check location [facility]    Preferred lab     Send INR reminders to  ANTICOAGULATION    Comments [facility]; new to [facility] INR on 1/16/18- To clarify [facility] is different than Facility, ok to give Verbals for INR  Mireya at [facility]  Ph. [phone]       Anticoagulation Care Providers     Provider Role Specialty Phone number    Azar Zaldivar MD [facility] Internal Medicine [phone]            See the Encounter Report to view Anticoagulation Flowsheet and Dosing Calendar (Go to Encounters tab in chart review, and find the Anticoagulation Therapy Visit)    Dosage adjustment made based on physician directed care plan.      Janny Haddad RN

## 2018-03-27 NOTE — MR AVS SNAPSHOT
"              After Visit Summary   3/27/2018    Bj Ponce    MRN: [id number]           Patient Information     Date Of Birth          10/6/1929        Visit Information        Provider Department      3/27/2018 11:00 AM Azar Zaldivar MD [facility]        Today's Diagnoses     Anxiety          Care Instructions    Call me if things change.      See me in 2 months for a follow up office visit.    Azar Zaldivar M.D.            Follow-ups after your visit        Your next 10 appointments already scheduled     Mar 27, 2018 11:45 AM CDT   Anticoagulation Visit with [facility]   [facility]    [address]  [facility] [address] [phone]              Who to contact     If you have questions or need follow up information about today's clinic visit or your schedule please contact [facility] directly at [phone].  Normal or non-critical lab and imaging results will be communicated to you by MyChart, letter or phone within 4 business days after the clinic has received the results. If you do not hear from us within 7 days, please contact the clinic through MyChart or phone. If you have a critical or abnormal lab result, we will notify you by phone as soon as possible.  Submit refill requests through [employer] or call your pharmacy and they will forward the refill request to us. Please allow 3 business days for your refill to be completed.          Additional Information About Your Visit        MyChart Information     [employer] lets you send messages to your doctor, view your test results, renew your prescriptions, schedule appointments and more. To sign up, go to [email or web address] . Click on \"Log in\" on the left side of the screen, which will take you to the Welcome page. Then click on \"Sign up Now\" on the right side of the page.     You will be asked to enter the access code listed below, as well as some personal information. " "Please follow the directions to create your username and password.     Your access code is: [email or web address]  Expires: 2018  1:00 PM     Your access code will  in 90 days. If you need help or a new code, please call your [facility] clinic or [phone].        Care EveryWhere ID     This is your Care EveryWhere ID. This could be used by other organizations to access your [facility] medical records  [id number]        Your Vitals Were     Pulse Temperature Height Pulse Oximetry BMI (Body Mass Index)       65 97.8  F (36.6  C) (Oral) 5' 9\" (1.753 m) 95% 18.02 kg/m2        Blood Pressure from Last 3 Encounters:   18 108/62   18 128/68   18 126/58    Weight from Last 3 Encounters:   18 122 lb (55.3 kg)   18 125 lb 8 oz (56.9 kg)   18 133 lb (60.3 kg)              Today, you had the following     No orders found for display         Today's Medication Changes          These changes are accurate as of 3/27/18 11:14 AM.  If you have any questions, ask your nurse or doctor.               Stop taking these medicines if you haven't already. Please contact your care team if you have questions.     cephALEXin 500 MG capsule   Commonly known as:  KEFLEX   Stopped by:  Azar Zaldivar MD           melatonin 3 MG tablet   Stopped by:  Azar Zaldivar MD                    Primary Care Provider Office Phone # Fax #    Azar Zaldivar [phone] [address]  [facility] [address]        Equal Access to Services     [facility]: Hadbon Reinoso, waaxda geno, qaybta rc grove. So [facility] [phone].    ATENCIÓN: Si habla español, tiene a clifford disposición servicios gratuitos de asistencia lingüística. Kenny al [phone].    We comply with applicable federal civil rights laws and [place] laws. We do not discriminate on the basis of race, color, national origin, age, disability, " sex, sexual orientation, or gender identity.            Thank you!     Thank you for choosing [facility]  for your care. Our goal is always to provide you with excellent care. Hearing back from our patients is one way we can continue to improve our services. Please take a few minutes to complete the written survey that you may receive in the mail after your visit with us. Thank you!             Your Updated Medication List - Protect others around you: Learn how to safely use, store and throw away your medicines at www.disposemymeds.org.          This list is accurate as of 3/27/18 11:14 AM.  Always use your most recent med list.                   Brand Name Dispense Instructions for use Diagnosis    aspirin EC 81 MG EC tablet     90 tablet    Take 1 tablet (81 mg) by mouth daily    Cerebrovascular accident (CVA), unspecified mechanism (H)       clotrimazole 1 % cream    LOTRIMIN     Apply topically 2 times daily Apply to feet and between toes    Cerebrovascular accident (CVA), unspecified mechanism (H)       losartan 100 MG tablet    COZAAR    90 tablet    Take 1 tablet (100 mg) by mouth daily    Cerebrovascular accident (CVA), unspecified mechanism (H)       metoprolol tartrate 25 MG tablet    LOPRESSOR    120 tablet    Take 2 tablets (50 mg) by mouth 2 times daily    Cerebrovascular accident (CVA), unspecified mechanism (H)       sertraline 50 MG tablet    ZOLOFT    90 tablet    Take 1 tablet (50 mg) by mouth daily    Anxiety       torsemide 20 MG tablet    DEMADEX    90 tablet    Take 1 tablet (20 mg) by mouth daily    Benign essential hypertension       warfarin 2.5 MG tablet    COUMADIN    180 tablet    2-3 tablets daily (5-7.5 mg) BY MOUTH DAILY OR AS INSTRUCTED BY INR CLINIC    Cerebrovascular accident (CVA), unspecified mechanism (H)

## 2018-04-10 NOTE — MR AVS SNAPSHOT
Bj Ponce   4/10/2018 9:45 AM   Anticoagulation Therapy Visit    Description:  88 year old male   Provider:   ANTICOAGULATION CLINIC   Department:   Nurse           INR as of 4/10/2018     Today's INR 5.2!      Anticoagulation Summary as of 4/10/2018     INR goal 2.0-3.0   Today's INR 5.2!   Full instructions 4/10: Hold; 4/11: Hold; Otherwise 5 mg on Tue, Thu, Sat; 7.5 mg all other days   Next INR check 4/13/2018    Indications   Intermittent atrial fibrillation (H) [I48.0]  Long-term (current) use of anticoagulants [Z79.01] [Z79.01]         Your next Anticoagulation Clinic appointment(s)     Apr 13, 2018  9:00 AM CDT   Anticoagulation Visit with  ANTICOAGULATION CLINIC   [facility] ([facility])    [address]  [place] [address]   [phone]              Contact Numbers     Clinic Number:         April 2018 Details    Sun Mon Tue Wed Thu Fri Sat     1               2               3               4               5               6               7                 8               9               10      Hold   See details      11      Hold         12      5 mg         13            14                 15               16               17               18               19               20               21                 22               23               24               25               26               27               28                 29               30                     Date Details   04/10 This INR check       Date of next INR:  4/13/2018         How to take your warfarin dose     To take:  5 mg Take 1 of the 5 mg tablets.    To take:  7.5 mg Take 1.5 of the 5 mg tablets.    Hold Do not take your warfarin dose. See the Details table to the right for additional instructions.

## 2018-04-10 NOTE — PROGRESS NOTES
ANTICOAGULATION FOLLOW-UP CLINIC VISIT    Patient Name:  Bj Ponce  Date:  4/10/2018  Contact Type:  Face to Face    SUBJECTIVE:     Patient Findings     Positives Inflammation, Unexplained INR or factor level change    Comments Patient denies any recent change in diet. Does report of some weight loss.   Patient reports falling on left elbow 2 weeks ago. I looked at elbow and seems to be healing. Not actively bleeding. Scabbed over and no signs of infection.   Denies unusual bleeding, no blood in stool, no blood in urine, no bloody noses  Patient does report of occasional diarrhea. Patient denies any blood/dark/black colored stool.   No vomiting or nausea.             OBJECTIVE    INR   Date Value Ref Range Status   04/10/2018 5.2  Final       ASSESSMENT / PLAN  INR assessment SUPRA    Recheck INR In: 3 DAYS    INR Location Clinic      Anticoagulation Summary as of 4/10/2018     INR goal 2.0-3.0   Today's INR 5.2!   Maintenance plan 5 mg (5 mg x 1) on Tue, Thu, Sat; 7.5 mg (5 mg x 1.5) all other days   Full instructions 4/10: Hold; 4/11: Hold; Otherwise 5 mg on Tue, Thu, Sat; 7.5 mg all other days   Weekly total 45 mg   Plan last modified Diana Castillo, RN (2/19/2018)   Next INR check 4/13/2018   Target end date Indefinite    Indications   Intermittent atrial fibrillation (H) [I48.0]  Long-term (current) use of anticoagulants [Z79.01] [Z79.01]         Anticoagulation Episode Summary     [facility]    Preferred lab     Send INR reminders to  ANTICOAGULATION    Comments [facility]; new to  INR on 1/16/18- [facility], ok to give Verbals for INR  Mireya at [facility]  Ph. [phone]       Anticoagulation Care Providers     Provider Role Specialty Phone number    Azar Zaldivar MD [facility] Internal Medicine [phone]            See the Encounter Report to view Anticoagulation Flowsheet and Dosing Calendar (Go to Encounters tab  in chart review, and find the Anticoagulation Therapy Visit)    Dosage adjustment made based on physician directed care plan.      Janny Haddad RN

## 2018-04-13 NOTE — PROGRESS NOTES
ANTICOAGULATION FOLLOW-UP CLINIC VISIT    Patient Name:  Bj Ponce  Date:  4/13/2018  Contact Type:  Face to Face    SUBJECTIVE:     Patient Findings     Positives No Problem Findings           OBJECTIVE    INR   Date Value Ref Range Status   04/10/2018 5.2  Final       ASSESSMENT / PLAN  INR assessment THER    Recheck INR In: 10 DAYS    INR Location Clinic      Anticoagulation Summary as of 4/13/2018     INR goal 2.0-3.0   Today's INR    Maintenance plan 7.5 mg (5 mg x 1.5) on Mon, Fri; 5 mg (5 mg x 1) all other days   Full instructions 7.5 mg on Mon, Fri; 5 mg all other days   Weekly total 40 mg   Plan last modified Tish Crisostomo RN (4/13/2018)   Next INR check 4/23/2018   Target end date Indefinite    Indications   Intermittent atrial fibrillation (H) [I48.0]  Long-term (current) use of anticoagulants [Z79.01] [Z79.01]         Anticoagulation Episode Summary     [facility]    Preferred lab     Send INR reminders to  ANTICOAGULATION    Comments [facility]; new to  INR on 1/16/18- To clarify [facility] is different than Facility, ok to give Verbals for INR  Mireya at [facility]  Ph. [phone]       Anticoagulation Care Providers     Provider Role Specialty Phone number    Azar Zaldivar MD [facility] Internal Medicine [phone]            See the Encounter Report to view Anticoagulation Flowsheet and Dosing Calendar (Go to Encounters tab in chart review, and find the Anticoagulation Therapy Visit)    Dosage adjustment made based on physician directed care plan.    Tish Crisostomo, RN

## 2018-04-13 NOTE — MR AVS SNAPSHOT
Bj CARROLL Kris   4/13/2018 9:00 AM   Anticoagulation Therapy Visit    Description:  88 year old male   Provider:   ANTICOAGULATION CLINIC   Department:   Nurse           INR as of 4/13/2018     Today's INR       Anticoagulation Summary as of 4/13/2018     INR goal 2.0-3.0   Today's INR    Full instructions 7.5 mg on Mon, Fri; 5 mg all other days   Next INR check 4/23/2018    Indications   Intermittent atrial fibrillation (H) [I48.0]  Long-term (current) use of anticoagulants [Z79.01] [Z79.01]         Your next Anticoagulation Clinic appointment(s)     Apr 23, 2018 10:15 AM CDT   Anticoagulation Visit with  ANTICOAGULATION CLINIC   [facility] ([facility])    [address]  [place] [address]   [phone]              Contact Numbers     Clinic Number:         April 2018 Details    Sun Mon Tue Wed Thu Fri Sat     1               2               3               4               5               6               7                 8               9               10               11               12               13      7.5 mg   See details      14      5 mg           15      5 mg         16      7.5 mg         17      5 mg         18      5 mg         19      5 mg         20      7.5 mg         21      5 mg           22      5 mg         23            24               25               26               27               28                 29               30                     Date Details   04/13 This INR check       Date of next INR:  4/23/2018         How to take your warfarin dose     To take:  5 mg Take 1 of the 5 mg tablets.    To take:  7.5 mg Take 1.5 of the 5 mg tablets.

## 2018-04-24 NOTE — MR AVS SNAPSHOT
Bj Ponce   4/24/2018 3:00 PM   Anticoagulation Therapy Visit    Description:  88 year old male   Provider:   ANTICOAGULATION CLINIC   Department:  [facility] Nurse           INR as of 4/24/2018     Today's INR 3.7!      Anticoagulation Summary as of 4/24/2018     INR goal 2.0-3.0   Today's INR 3.7!   Full instructions 4/27: 5 mg; 5/4: 5 mg; Otherwise 7.5 mg on Mon, Fri; 5 mg all other days   Next INR check 5/9/2018    Indications   Intermittent atrial fibrillation (H) [I48.0]  Long-term (current) use of anticoagulants [Z79.01] [Z79.01]         Your next Anticoagulation Clinic appointment(s)     May 08, 2018  2:45 PM CDT   Anticoagulation Visit with  ANTICOAGULATION CLINIC   [facility]    [address]  [place] [address]   [phone]              Contact Numbers     Clinic Number:         April 2018 Details    Sun Mon Tue Wed Thu Fri Sat     1               2               3               4               5               6               7                 8               9               10               11               12               13               14                 15               16               17               18               19               20               21                 22               23               24      5 mg   See details      25      5 mg         26      5 mg         27      5 mg         28      5 mg           29      5 mg         30      7.5 mg               Date Details   04/24 This INR check               How to take your warfarin dose     To take:  5 mg Take 1 of the 5 mg tablets.    To take:  7.5 mg Take 1.5 of the 5 mg tablets.           May 2018 Details    Sun Mon Tue Wed Thu Fri Sat       1      5 mg         2      5 mg         3      5 mg         4      5 mg         5      5 mg           6      5 mg         7      7.5 mg         8      5 mg         9            10               11               12                 13                14               15               16               17               18               19                 20               21               22               23               24               25               26                 27               28               29               30               31                  Date Details   No additional details    Date of next INR:  5/9/2018         How to take your warfarin dose     To take:  5 mg Take 1 of the 5 mg tablets.    To take:  7.5 mg Take 1.5 of the 5 mg tablets.

## 2018-04-24 NOTE — PROGRESS NOTES
ANTICOAGULATION FOLLOW-UP CLINIC VISIT    Patient Name:  Bj Ponce  Date:  4/24/2018  Contact Type:  Face to Face    SUBJECTIVE:     Patient Findings     Positives Unexplained INR or factor level change    Comments INR still slightly supra. Made adjustment for patient to do 7.5 mg on Monday and 5 mg AOD. Recheck in 2 weeks.            OBJECTIVE    INR Protime   Date Value Ref Range Status   04/24/2018 3.7 (A) 0.86 - 1.14 Final       ASSESSMENT / PLAN  INR assessment SUPRA    Recheck INR In: 2 WEEKS    INR Location Clinic      Anticoagulation Summary as of 4/24/2018     INR goal 2.0-3.0   Today's INR 3.7!   Maintenance plan 7.5 mg (5 mg x 1.5) on Mon; 5 mg (5 mg x 1) all other days   Full instructions 7.5 mg on Mon; 5 mg all other days   Weekly total 37.5 mg   Plan last modified Janny Haddad, RN (4/24/2018)   Next INR check 5/9/2018   Target end date Indefinite    Indications   Intermittent atrial fibrillation (H) [I48.0]  Long-term (current) use of anticoagulants [Z79.01] [Z79.01]         Anticoagulation Episode Summary     [facility]    Preferred lab     Send INR reminders to CS ANTICOAGULATION    Comments [facility]; new to  INR on 1/16/18- To clarify [facility] is different than Facility, ok to give Verbals for INR  Mireya at [facility]  Ph. [phone]       Anticoagulation Care Providers     Provider Role Specialty Phone number    Azar Zaldivar MD [facility] Internal Medicine [phone]            See the Encounter Report to view Anticoagulation Flowsheet and Dosing Calendar (Go to Encounters tab in chart review, and find the Anticoagulation Therapy Visit)    Dosage adjustment made based on physician directed care plan.      Janny Haddad, RN

## 2018-04-25 NOTE — TELEPHONE ENCOUNTER
"Warfarin 2.5 mg    Last Written Prescription Date:  02/26/18  Last Fill Quantity: 180 tablets,  # refills: 0   Last office visit: 3/27/2018 with prescribing provider:  Kayley   Future Office Visit:   Next 5 appointments (look out 90 days)     Jun 05, 2018 11:00 AM CDT   Office Visit with Azar Zaldivar MD   [facility] ([facility]    [address] [facility] [id number] [phone]                 Requested Prescriptions   Pending Prescriptions Disp Refills     warfarin (COUMADIN) 2.5 MG tablet [Pharmacy Med Name: WARFARIN SODIUM 2.5 MG TABLET] 180 tablet 0     Sig: TAKE 2-3 TABLETS DAILY (5-7.5 MG) BY MOUTH DAILY OR AS INSTRUCTED BY INR CLINIC    Vitamin K Antagonists Failed    4/25/2018  1:36 AM       Failed - INR is within goal in the past 6 weeks    Confirm INR is within goal in the past 6 weeks.     Recent Labs   Lab Test 04/24/18   INR  3.7*                      Passed - Recent (12 mo) or future (30 days) visit within the authorizing provider's specialty    Patient had office visit in the last 12 months or has a visit in the next 30 days with authorizing provider or within the authorizing provider's specialty.  See \"Patient Info\" tab in inbasket, or \"Choose Columns\" in Meds & Orders section of the refill encounter.           Passed - Patient is 18 years of age or older          "

## 2018-04-25 NOTE — TELEPHONE ENCOUNTER
Prescription approved per [facility] according to the [facility] Refill Protocol.  INR is up-to-date.    ELYSIA Smith, RN, PHN

## 2018-05-08 NOTE — MR AVS SNAPSHOT
Bj Ponce   5/8/2018 2:45 PM   Anticoagulation Therapy Visit    Description:  88 year old male   Provider:   ANTICOAGULATION CLINIC   Department:   Nurse           INR as of 5/8/2018     Today's INR 2.5      Anticoagulation Summary as of 5/8/2018     INR goal 2.0-3.0   Today's INR 2.5   Full instructions 7.5 mg on Mon; 5 mg all other days   Next INR check 5/29/2018    Indications   Intermittent atrial fibrillation (H) [I48.0]  Long-term (current) use of anticoagulants [Z79.01] [Z79.01]         Your next Anticoagulation Clinic appointment(s)     May 29, 2018 10:15 AM CDT   Anticoagulation Visit with  ANTICOAGULATION CLINIC   [facility] [place] ([facility])    [address]  [place] [address] [phone]              Contact Numbers     Clinic Number:         May 2018 Details    Sun Mon Tue Wed Thu Fri Sat       1               2               3               4               5                 6               7               8      5 mg   See details      9      5 mg         10      5 mg         11      5 mg         12      5 mg           13      5 mg         14      7.5 mg         15      5 mg         16      5 mg         17      5 mg         18      5 mg         19      5 mg           20      5 mg         21      7.5 mg         22      5 mg         23      5 mg         24      5 mg         25      5 mg         26      5 mg           27      5 mg         28      7.5 mg         29            30               31                  Date Details   05/08 This INR check       Date of next INR:  5/29/2018         How to take your warfarin dose     To take:  5 mg Take 1 of the 5 mg tablets.    To take:  7.5 mg Take 1.5 of the 5 mg tablets.

## 2018-05-08 NOTE — PROGRESS NOTES
ANTICOAGULATION FOLLOW-UP CLINIC VISIT    Patient Name:  Bj Ponce  Date:  5/8/2018  Contact Type:  Face to Face    SUBJECTIVE:        OBJECTIVE    INR Protime   Date Value Ref Range Status   05/08/2018 2.5 (A) 0.86 - 1.14 Final       ASSESSMENT / PLAN  INR assessment THER    Recheck INR In: 3 WEEKS    INR Location Clinic      Anticoagulation Summary as of 5/8/2018     INR goal 2.0-3.0   Today's INR 2.5   Maintenance plan 7.5 mg (5 mg x 1.5) on Mon; 5 mg (5 mg x 1) all other days   Full instructions 7.5 mg on Mon; 5 mg all other days   Weekly total 37.5 mg   No change documented Kate Lowry RN   Plan last modified Janny Haddad RN (4/24/2018)   Next INR check 5/29/2018   Target end date Indefinite    Indications   Intermittent atrial fibrillation (H) [I48.0]  Long-term (current) use of anticoagulants [Z79.01] [Z79.01]         Anticoagulation Episode Summary     IN[facility]    Preferred lab     Send INR reminders to  ANTICOAGULATION    Comments [facility]; new to [facility] INR on 1/16/18- To clarify [facility] is different than Facility, ok to give Verbals for INR  Mireya at [facility]  Ph. [phone]       Anticoagulation Care Providers     Provider Role Specialty Phone number    Azar Zaldivar MD [facility] Internal Medicine [phone]            See the Encounter Report to view Anticoagulation Flowsheet and Dosing Calendar (Go to Encounters tab in chart review, and find the Anticoagulation Therapy Visit)    Patient is here with Wife Kapil.  Dosing based on [facility] Protocol and Provider directed care plan.      Kate Lowry, RN

## 2018-05-18 NOTE — TELEPHONE ENCOUNTER
Per Mandi Calles I tried to contact patient to let him know that his CT scan was completely normal.  No answer at home number, voice mail left for patient to call back.  Dalia Livingston, [facility]

## 2018-05-18 NOTE — MR AVS SNAPSHOT
"              After Visit Summary   5/18/2018    Bj Ponce    MRN: [id number]           Patient Information     Date Of Birth          10/6/1929        Visit Information        Provider Department      5/18/2018 1:00 PM Mandi Calles APRN [facility]        Today's Diagnoses     Significant closed head trauma within past 3 months    -  1    Current use of long term anticoagulation          Care Instructions    Concussion Discharge Instructions  You were seen today for signs of a concussion. The symptoms will vary, depending on the nature of your injury and your health. You may have: headache, confusion, nausea (feel sick to your stomach), vomiting (throwing up) and problems with memory, concentrating or sleep. You may feel dizzy, irritable, and tired.   Children and teens may need help from their parents, teachers and coaches to watch for symptoms as they recover.  Follow-up  It is important for you to see a doctor for follow-up care to see how you are recovering. Please see your primary doctor within the next 5 to 7 days. You may have also been referred to the Concussion  service. They will contact you and arrange a follow-up visit if needed. If you need help sooner, you may call them at [phone].  Warning signs  Call your doctor or come back to Emergency if you suddenly have any of these symptoms:    Headaches that get worse    Feeling more and more drowsy    You keep repeating yourself    Strange behavior    Seizures    Repeat vomiting (throwing up)    Trouble walking    Growing confusion    Feeling more irritable    Neck pain that gets worse    Slurred speech    Weakness or numbness    Loss of consciousness    Fluid or blood coming from ears or nose  Self-care    Get lots of rest and get enough sleep at night. Take daytime naps or rest if you feel tired.    Limit physical activity and \"thinking\" activities. These can make symptoms worse.  ? Physical activity includes gym, " sports, weight training, running, exercise and heavy lifting.  ? Thinking activities include homework, class work, job-related work and screen time (phone, computer, tablet, TV and video games).    Stick to a healthy diet and drink lots of fluids.    As symptoms improve, you may slowly return to your daily activities. If symptoms get worse   or return, reduce your activity.    Know that it is normal to feel sad and frustrated when you do not feel right and are less active.  Going back to work    Your care team will tell you when you are ready to return to work.    Limit the amount of work you do soon after your injury. This may speed healing. Take breaks if your symptoms get worse. You should also reduce your physical activity as well as activities that require a lot of thinking until you see your doctor.    You may need shorter work days and a lighter workload.    Avoid heavy lifting, working with machinery, driving and working at heights until your symptoms are gone or you are cleared by a doctor.  Returning to sports    Never return to play if you have any symptoms. A full recovery will reduce the chances of getting hurt again. Remember, it is better to miss one or two games than a whole season.    You should rest from all physical activity until you see your doctor. Generally, if all symptoms have completely cleared, your doctor can help guide you to slowly return to sports. If symptoms return or worsen, stop the activity and see your doctor.    Important: If you are in an organized sport and under age 18, you will need written consent from a healthcare provider before you return to sports. Typically, this will be your primary care or sports medicine doctor. Please make an appointment.  Going back to school    If you are still having symptoms, you may need extra help at school.    Tell your teachers and school nurse about your injury and symptoms. Ask them to watch for problems with learning, memory and  "concentrating. Symptoms may get worse when you do schoolwork, and you may become more irritable.    You may need shorter school days, a reduced workload, and to postpone testing.    Do not drive or take gym class (physical activity) until cleared by a doctor.    For informational purposes only. Not to replace the advice of your health care provider.   2009 [employer]. Used with permission. This form is adapted from the \"Heads Up: Brain Injury in Your Practice\" tool kit developed by the Centers for Disease Control and Prevention (CDC). All rights reserved. [employer]. Re Pet [id number] - Rev 03/17.    Concussion    A concussion can be caused by a direct blow to the head, neck, face, or somewhere else on the body with the force being transmitted to the head. This may cause you to lose consciousness - be \"knocked out\" - but not always. Depending on the severity of the blow, it will take from a few hours up to a few days to get better. Sometimes symptoms may last a few months or longer. This is called post-concussion syndrome.  At first, you may have a headache, nausea, vomiting, or dizziness. You may also have problems concentrating or remembering things. This is normal.  Symptoms should get better as the hours and days go by. Symptoms that get worse could be a sign of a more serious injury. This might be a bruise or bleeding in the brain. That s why it s important to watch for the warning signs listed below.  Home care  If your injury is mild and there are no serious signs or symptoms, your healthcare provider may recommend that you be monitored at home. If there is evidence that the injury is more serious, you will be monitored in the hospital. Follow these tips to help care for yourself at home:    After a concussion, your healthcare provider may recommend that a family member or friend monitor you for 12 to 24 hours. They may be told to wake you every few hours " during sleep to check for the signs below.    If your face or scalp swells, apply an ice pack for 20 minutes every 1 to 2 hours. Do this until the swelling starts to go down. You can make an ice pack by putting ice cubes in a plastic bag and wrapping the bag in a towel.    You may use acetaminophen to control pain, unless another pain medicine was prescribed. Do not use aspirin or ibuprofen after a head injury. If you have chronic liver or kidney disease, talk with your doctor before using these medicines. Also talk with your doctor if you ever had a stomach ulcer or gastrointestinal bleeding.    For the next 24 hours:  ? Don t drink alcohol or take sedatives or medicines that make you sleepy.  ? Don t drive or operate machinery.  ? Avoid doing anything strenuous. Don t lift or strain.    Don t return to sports or any activity that could cause you to hit your head until all symptoms are gone and you have been cleared by your doctor. A second head injury before fully recovering from the first one can lead to serious brain injury.    Avoid doing activities that require a lot of concentration or a lot of attention. This will allow your brain to rest and heal quicker.  Follow-up care  Follow up with your doctor in 1 week, or as directed.  Note: A radiologist will review any X-rays or CT scans that were taken. You will be told of any new findings that may affect your care.  When to seek medical advice  Call your healthcare provider right away if any of these occur:    Repeated vomiting    Headache or dizziness that is severe or gets worse    Loss of consciousness    Unusual drowsiness, or unable to wake up as usual    Weakness or decreased ability to walk or move any limb    Confusion, agitation, or change in behavior or speech, or memory loss    Blurred vision    Convulsion (seizure)    Swelling on the scalp or face that gets worse    Changes in pupil size (the black part of the eye)    Redness, warmth, or pus from the  swollen area    Fluid draining from or bleeding from the nose or ears     Date Last Reviewed: 8/14/2015 2000-2017 [employer]. [address] [place] [address], [place] [address]. All rights reserved. This information is not intended as a substitute for professional medical care. Always follow your healthcare professional's instructions.      Begin cold compressing the frontal scalp injury to minimize swelling and continued bleeding          Follow-ups after your visit        Follow-up notes from your care team     Return if symptoms worsen or fail to improve.      Your next 10 appointments already scheduled     May 29, 2018 10:15 AM CDT   Anticoagulation Visit with [facility]   [facility]    [address] [facility] [address]   [phone]            Jun 05, 2018 11:00 AM CDT   Office Visit with Azar Zaldivar MD   [facility] ([facility])    [address] [facility] [address]   [phone]           Bring a current list of meds and any records pertaining to this visit. For Physicals, please bring immunization records and any forms needing to be filled out. Please arrive 10 minutes early to complete paperwork.              Future tests that were ordered for you today     Open Future Orders        Priority Expected Expires Ordered    CT Head w/o Contrast Routine  5/18/2019 5/18/2018            Who to contact     If you have questions or need follow up information about today's clinic visit or your schedule please contact [facility] directly at [phone].  Normal or non-critical lab and imaging results will be communicated to you by MyChart, letter or phone within 4 business days after the clinic has received the results. If you do not hear from us within 7 days, please contact the clinic through MyChart or phone. If you have a critical or abnormal lab result, we will notify you by phone as soon as  "possible.  Submit refill requests through [employer] or call your pharmacy and they will forward the refill request to us. Please allow 3 business days for your refill to be completed.          Additional Information About Your Visit        C[employer]m Information     [employer] lets you send messages to your doctor, view your test results, renew your prescriptions, schedule appointments and more. To sign up, go to [email or web address] . Click on \"Log in\" on the left side of the screen, which will take you to the Welcome page. Then click on \"Sign up Now\" on the right side of the page.     You will be asked to enter the access code listed below, as well as some personal information. Please follow the directions to create your username and password.     Your access code is: [email or web address]  Expires: 2018  1:08 PM     Your access code will  in 90 days. If you need help or a new code, please call your [place] clinic or [phone].        Care EveryWhere ID     This is your Care EveryWhere ID. This could be used by other organizations to access your [place] medical records  [id number]        Your Vitals Were     Pulse Temperature Height Pulse Oximetry BMI (Body Mass Index)       62 96.5  F (35.8  C) (Tympanic) 5' 9\" (1.753 m) 100% 18.89 kg/m2        Blood Pressure from Last 3 Encounters:   18 127/63   18 108/62   18 128/68    Weight from Last 3 Encounters:   18 127 lb 14.4 oz (58 kg)   18 122 lb (55.3 kg)   18 125 lb 8 oz (56.9 kg)                 Today's Medication Changes          These changes are accurate as of 18  1:40 PM.  If you have any questions, ask your nurse or doctor.               Start taking these medicines.        Dose/Directions    clotrimazole-betamethasone cream   Commonly known as:  LOTRISONE   Started by:  Mandi Calles APRN CNP        Apply topically 2 times daily   Quantity:  15 g   Refills:  1            Where to get your medicines      These " medications were sent to [id number] - [place], [place] - [address] [facility]  [address] [facility] [address]     Phone:  [phone]     clotrimazole-betamethasone cream                Primary Care Provider Office Phone # Fax #    Azar Zaldivar [phone] [address] [facility] [address]  [facility] [address]        Equal Access to Services     LALITHA MERA : Hadii aad ku hadasho Soomaali, waaxda luqadaha, qaybta kaalmada adeegyada, waxay idiin hayaan adeeg kharash la'jacn . So [facility] [phone].    ATENCIÓN: Si habla español, tiene a clifford disposición servicios gratuitos de asistencia lingüística. [facility] [phone].    We comply with applicable federal civil rights laws and [place] laws. We do not discriminate on the basis of race, color, national origin, age, disability, sex, sexual orientation, or gender identity.            Thank you!     Thank you for choosing [facility]  for your care. Our goal is always to provide you with excellent care. Hearing back from our patients is one way we can continue to improve our services. Please take a few minutes to complete the written survey that you may receive in the mail after your visit with us. Thank you!             Your Updated Medication List - Protect others around you: Learn how to safely use, store and throw away your medicines at www.disposemymeds.org.          This list is accurate as of 5/18/18  1:40 PM.  Always use your most recent med list.                   Brand Name Dispense Instructions for use Diagnosis    aspirin 81 MG EC tablet     90 tablet    Take 1 tablet (81 mg) by mouth daily    Cerebrovascular accident (CVA), unspecified mechanism (H)       clotrimazole 1 % cream    LOTRIMIN     Apply topically 2 times daily Apply to feet and between toes    Cerebrovascular accident (CVA), unspecified mechanism (H)       clotrimazole-betamethasone cream    LOTRISONE    15 g    Apply topically 2 times daily         losartan 100 MG tablet    COZAAR    90 tablet    Take 1 tablet (100 mg) by mouth daily    Cerebrovascular accident (CVA), unspecified mechanism (H)       metoprolol tartrate 25 MG tablet    LOPRESSOR    120 tablet    Take 2 tablets (50 mg) by mouth 2 times daily    Cerebrovascular accident (CVA), unspecified mechanism (H)       sertraline 50 MG tablet    ZOLOFT    90 tablet    Take 1 tablet (50 mg) by mouth daily    Anxiety       torsemide 20 MG tablet    DEMADEX    90 tablet    Take 1 tablet (20 mg) by mouth daily    Benign essential hypertension       warfarin 2.5 MG tablet    COUMADIN    180 tablet    TAKE 2-3 TABLETS DAILY (5-7.5 MG) BY MOUTH DAILY OR AS INSTRUCTED BY INR CLINIC    Cerebrovascular accident (CVA), unspecified mechanism (H)

## 2018-05-18 NOTE — MR AVS SNAPSHOT
Bj CARROLL Kris   5/18/2018   Anticoagulation Therapy Visit    Description:  88 year old male   Provider:  Azar Zaldivar MD   Department:  [facility]           INR as of 5/18/2018     Today's INR 2.8      Anticoagulation Summary as of 5/18/2018     INR goal 2.0-3.0   Today's INR 2.8   Full instructions 7.5 mg on Mon; 5 mg all other days   Next INR check 6/5/2018    Indications   Intermittent atrial fibrillation (H) [I48.0]  Long-term (current) use of anticoagulants [Z79.01] [Z79.01]         Your next Anticoagulation Clinic appointment(s)     Jun 05, 2018 11:45 AM CDT   Anticoagulation [facility] with  [facility] CLINIC   [facility]    [address]  [place] [address]   [phone]              May 2018 Details    Sun Mon Tue Wed Thu Fri Sat       1               2               3               4               5                 6               7               8               9               10               11               12                 13               14               15               16               17               18      5 mg   See details      19      5 mg           20      5 mg         21      7.5 mg         22      5 mg         23      5 mg         24      5 mg         25      5 mg         26      5 mg           27      5 mg         28      7.5 mg         29      5 mg         30      5 mg         31      5 mg            Date Details   05/18 This INR check               How to take your warfarin dose     To take:  5 mg Take 1 of the 5 mg tablets.    To take:  7.5 mg Take 1.5 of the 5 mg tablets.           June 2018 Details    Sun Mon Tue Wed Thu Fri Sat          1      5 mg         2      5 mg           3      5 mg         4      7.5 mg         5            6               7               8               9                 10               11               12               13               14               15               16                  17               18               19               20               21               22               23                 24               25               26               27               28               29               30                Date Details   No additional details    Date of next INR:  6/5/2018         How to take your warfarin dose     To take:  5 mg Take 1 of the 5 mg tablets.    To take:  7.5 mg Take 1.5 of the 5 mg tablets.

## 2018-05-18 NOTE — LETTER
[facility]  [address] [facility]  [address]  [place]  [address]  Tel: [phone]    May 18, 2018    Bj Ponce  [address] [facility]   [place] [address]        Dear Mr. Ponce,    The CT scan of your head is negative for evidence of a bleed and the remainder of the findings are age appropriate changes of the brain.     If you have any further questions or problems, please contact our office.      Sincerely,    Mandi Calles, NP/minh    Results for orders placed or performed during the hospital encounter of 05/18/18   CT Head w/o Contrast    Narrative    CT SCAN OF THE HEAD WITHOUT CONTRAST   5/18/2018 3:16 PM     HISTORY: Fall with closed head trauma today. No neurologic symptoms,  no headache, frontal scalp injury. On coumadin.    TECHNIQUE: Axial images of the head and coronal reformations without  IV contrast material. Radiation dose for this scan was reduced using  automated exposure control, adjustment of the mA and/or kV according  to patient size, or iterative reconstruction technique.    COMPARISON: None.    FINDINGS: There is no evidence of intracranial hemorrhage, mass, acute  infarct or anomaly. The ventricles are normal in size, shape and  configuration. Mild diffuse parenchymal volume loss. Mild patchy  periventricular white matter hypodensities which are nonspecific, but  likely related to chronic microvascular ischemic disease.     The visualized portions of the sinuses and mastoids appear normal. The  bony calvarium and bones of the skull base appear intact.       Impression    IMPRESSION:     1. No evidence of acute intracranial hemorrhage, mass, or herniation.  2. Mild diffuse parenchymal volume loss and white matter changes  likely due to chronic microvascular ischemic disease.    Results discussed with Mandi Calles at 3:30 PM on 5/18/2018.    CHELE HURD MD           Enclosure: Lab Results

## 2018-05-18 NOTE — PATIENT INSTRUCTIONS
"Concussion Discharge Instructions  You were seen today for signs of a concussion. The symptoms will vary, depending on the nature of your injury and your health. You may have: headache, confusion, nausea (feel sick to your stomach), vomiting (throwing up) and problems with memory, concentrating or sleep. You may feel dizzy, irritable, and tired.   Children and teens may need help from their parents, teachers and coaches to watch for symptoms as they recover.  Follow-up  It is important for you to see a doctor for follow-up care to see how you are recovering. Please see your primary doctor within the next 5 to 7 days. You may have also been referred to the Concussion  service. They will contact you and arrange a follow-up visit if needed. If you need help sooner, you may call them at [phone].  Warning signs  Call your doctor or come back to Emergency if you suddenly have any of these symptoms:    Headaches that get worse    Feeling more and more drowsy    You keep repeating yourself    Strange behavior    Seizures    Repeat vomiting (throwing up)    Trouble walking    Growing confusion    Feeling more irritable    Neck pain that gets worse    Slurred speech    Weakness or numbness    Loss of consciousness    Fluid or blood coming from ears or nose  Self-care    Get lots of rest and get enough sleep at night. Take daytime naps or rest if you feel tired.    Limit physical activity and \"thinking\" activities. These can make symptoms worse.  ? Physical activity includes gym, sports, weight training, running, exercise and heavy lifting.  ? Thinking activities include homework, class work, job-related work and screen time (phone, computer, tablet, TV and video games).    Stick to a healthy diet and drink lots of fluids.    As symptoms improve, you may slowly return to your daily activities. If symptoms get worse   or return, reduce your activity.    Know that it is normal to feel sad and frustrated when you do " not feel right and are less active.  Going back to work    Your care team will tell you when you are ready to return to work.    Limit the amount of work you do soon after your injury. This may speed healing. Take breaks if your symptoms get worse. You should also reduce your physical activity as well as activities that require a lot of thinking until you see your doctor.    You may need shorter work days and a lighter workload.    Avoid heavy lifting, working with machinery, driving and working at heights until your symptoms are gone or you are cleared by a doctor.  Returning to sports    Never return to play if you have any symptoms. A full recovery will reduce the chances of getting hurt again. Remember, it is better to miss one or two games than a whole season.    You should rest from all physical activity until you see your doctor. Generally, if all symptoms have completely cleared, your doctor can help guide you to slowly return to sports. If symptoms return or worsen, stop the activity and see your doctor.    Important: If you are in an organized sport and under age 18, you will need written consent from a healthcare provider before you return to sports. Typically, this will be your primary care or sports medicine doctor. Please make an appointment.  Going back to school    If you are still having symptoms, you may need extra help at school.    Tell your teachers and school nurse about your injury and symptoms. Ask them to watch for problems with learning, memory and concentrating. Symptoms may get worse when you do schoolwork, and you may become more irritable.    You may need shorter school days, a reduced workload, and to postpone testing.    Do not drive or take gym class (physical activity) until cleared by a doctor.    For informational purposes only. Not to replace the advice of your health care provider.   2009 [employer]. Used with permission. This form is adapted  "from the \"Heads Up: Brain Injury in Your Practice\" tool kit developed by the Centers for Disease Control and Prevention (CDC). All rights reserved. [place] [employer]. [employer] [id number] - Rev 03/17.    Concussion    A concussion can be caused by a direct blow to the head, neck, face, or somewhere else on the body with the force being transmitted to the head. This may cause you to lose consciousness - be \"knocked out\" - but not always. Depending on the severity of the blow, it will take from a few hours up to a few days to get better. Sometimes symptoms may last a few months or longer. This is called post-concussion syndrome.  At first, you may have a headache, nausea, vomiting, or dizziness. You may also have problems concentrating or remembering things. This is normal.  Symptoms should get better as the hours and days go by. Symptoms that get worse could be a sign of a more serious injury. This might be a bruise or bleeding in the brain. That s why it s important to watch for the warning signs listed below.  Home care  If your injury is mild and there are no serious signs or symptoms, your healthcare provider may recommend that you be monitored at home. If there is evidence that the injury is more serious, you will be monitored in the hospital. Follow these tips to help care for yourself at home:    After a concussion, your healthcare provider may recommend that a family member or friend monitor you for 12 to 24 hours. They may be told to wake you every few hours during sleep to check for the signs below.    If your face or scalp swells, apply an ice pack for 20 minutes every 1 to 2 hours. Do this until the swelling starts to go down. You can make an ice pack by putting ice cubes in a plastic bag and wrapping the bag in a towel.    You may use acetaminophen to control pain, unless another pain medicine was prescribed. Do not use aspirin or ibuprofen after a head injury. If you have chronic liver or kidney " disease, talk with your doctor before using these medicines. Also talk with your doctor if you ever had a stomach ulcer or gastrointestinal bleeding.    For the next 24 hours:  ? Don t drink alcohol or take sedatives or medicines that make you sleepy.  ? Don t drive or operate machinery.  ? Avoid doing anything strenuous. Don t lift or strain.    Don t return to sports or any activity that could cause you to hit your head until all symptoms are gone and you have been cleared by your doctor. A second head injury before fully recovering from the first one can lead to serious brain injury.    Avoid doing activities that require a lot of concentration or a lot of attention. This will allow your brain to rest and heal quicker.  Follow-up care  Follow up with your doctor in 1 week, or as directed.  Note: A radiologist will review any X-rays or CT scans that were taken. You will be told of any new findings that may affect your care.  When to seek medical advice  Call your healthcare provider right away if any of these occur:    Repeated vomiting    Headache or dizziness that is severe or gets worse    Loss of consciousness    Unusual drowsiness, or unable to wake up as usual    Weakness or decreased ability to walk or move any limb    Confusion, agitation, or change in behavior or speech, or memory loss    Blurred vision    Convulsion (seizure)    Swelling on the scalp or face that gets worse    Changes in pupil size (the black part of the eye)    Redness, warmth, or pus from the swollen area    Fluid draining from or bleeding from the nose or ears     Date Last Reviewed: 8/14/2015 2000-2017 [employer]. [address] [place] [address], [place] [address]. All rights reserved. This information is not intended as a substitute for professional medical care. Always follow your healthcare professional's instructions.      Begin cold compressing the frontal scalp injury to minimize swelling and continued bleeding

## 2018-05-18 NOTE — PROGRESS NOTES
SUBJECTIVE:   Bj Ponce is a 88 year old male who presents to clinic today for the following health issues:      Fall      Duration: Fell today while standing, lost his balance and fell forward, hit a coffee talbe    Description (location/character/radiation): Bump on forehead and laceration on the bridge of hs nose    [facility] advised the patient to have an evauationl here today    Patient is worried about having a concussion    No headache,nausea or vomiting, dizziness    Patient ambulated down the hallway with his walker and was stable  Vitals taken after fall as follows:  156/72   P 70  O2 96%-       Problem list and histories reviewed & adjusted, as indicated.  Additional history: as documented    Patient Active Problem List   Diagnosis     Pseudogout     Benign essential hypertension     ASCVD (arteriosclerotic cardiovascular disease)     CKD (chronic kidney disease) stage 4, GFR 15-29 ml/min (H)     PVD (peripheral vascular disease) with claudication (H)     Hyperlipidemia LDL goal <100     Intermittent atrial fibrillation (H)     Long-term (current) use of anticoagulants [Z79.01]     CVA (cerebral vascular accident) (H)     Cardiomyopathy (H)     Anxiety     Past Surgical History:   Procedure Laterality Date     CATARACT IOL, RT/LT  2006     CORONARY ARTERY BYPASS  2004     HYDROCELECTOMY SCROTAL  1997     TONSILLECTOMY       TURP  1997       Social History   Substance Use Topics     Smoking status: Former Smoker     Quit date: 4/16/1954     Smokeless tobacco: Never Used     Alcohol use No     Family History   Problem Relation Age of Onset     Breast Cancer Sister          Current Outpatient Prescriptions   Medication Sig Dispense Refill     aspirin EC 81 MG EC tablet Take 1 tablet (81 mg) by mouth daily 90 tablet 3     clotrimazole (LOTRIMIN) 1 % cream Apply topically 2 times daily Apply to feet and between toes       clotrimazole-betamethasone (LOTRISONE) cream Apply topically 2  "times daily 15 g 1     losartan (COZAAR) 100 MG tablet Take 1 tablet (100 mg) by mouth daily 90 tablet 3     metoprolol tartrate (LOPRESSOR) 25 MG tablet Take 2 tablets (50 mg) by mouth 2 times daily 120 tablet 11     sertraline (ZOLOFT) 50 MG tablet Take 1 tablet (50 mg) by mouth daily 90 tablet 1     torsemide (DEMADEX) 20 MG tablet Take 1 tablet (20 mg) by mouth daily 90 tablet 3     warfarin (COUMADIN) 2.5 MG tablet TAKE 2-3 TABLETS DAILY (5-7.5 MG) BY MOUTH DAILY OR AS INSTRUCTED BY INR CLINIC 180 tablet 0     No Known Allergies    Reviewed and updated as needed this visit by clinical staff       Reviewed and updated as needed this visit by Provider         ROS:  Constitutional, HEENT, cardiovascular, pulmonary, gi and gu systems are negative, except as otherwise noted.  Consti: he has had no acute illness with fever or chills  HEENT: no sinus symptoms, no epistaxis   CV: did not have any palpitations or chest pain  PULM: no SOB or wheeze  MS: denies any other injuries, no neck pain   NEURO: no nausea, no dizziness or changes in vision, speech or balance    OBJECTIVE:     /63 (BP Location: Left arm, Patient Position: Chair, Cuff Size: Adult Regular)  Pulse 62  Temp 96.5  F (35.8  C) (Tympanic)  Ht 5' 9\" (1.753 m)  Wt 127 lb 14.4 oz (58 kg)  SpO2 100%  BMI 18.89 kg/m2  Body mass index is 18.89 kg/(m^2).  GENERAL: healthy, alert and no distress  EYES: Eyes grossly normal to inspection, PERRL and conjunctivae and sclerae normal  HENT: ear canals and TM's normal, nose and mouth without ulcers or lesions, small hematoma of right frotal skull, and superficial laceration on the bridge of the nose   NECK: no tenderness of paracervical musculature  RESP: lungs clear to auscultation - no rales, rhonchi or wheezes  CV: regular rate and rhythm, normal S1 S2, no S3 or S4, no murmur, click or rub, no peripheral edema   MS: no gross musculoskeletal defects noted, no edema  NEURO: even gait     Diagnostic Test " "Results:  none     ASSESSMENT/PLAN:       ICD-10-CM    1. Acute head trauma, initial encounter S09.90XA CT Head w/o Contrast     CT Head w/o Contrast   2. Current use of long term anticoagulation Z79.01    I have discussed with Mr Kris that he should continue cold packs on the hematoma .  Also that signs of a slow bleed in the brain can come even weeks after the injury     Patient Instructions   Concussion Discharge Instructions  You were seen today for signs of a concussion. The symptoms will vary, depending on the nature of your injury and your health. You may have: headache, confusion, nausea (feel sick to your stomach), vomiting (throwing up) and problems with memory, concentrating or sleep. You may feel dizzy, irritable, and tired.   Children and teens may need help from their parents, teachers and coaches to watch for symptoms as they recover.  Follow-up  It is important for you to see a doctor for follow-up care to see how you are recovering. Please see your primary doctor within the next 5 to 7 days. You may have also been referred to the Concussion  service. They will contact you and arrange a follow-up visit if needed. If you need help sooner, you may call them at [phone].  Warning signs  Call your doctor or come back to Emergency if you suddenly have any of these symptoms:    Headaches that get worse    Feeling more and more drowsy    You keep repeating yourself    Strange behavior    Seizures    Repeat vomiting (throwing up)    Trouble walking    Growing confusion    Feeling more irritable    Neck pain that gets worse    Slurred speech    Weakness or numbness    Loss of consciousness    Fluid or blood coming from ears or nose  Self-care    Get lots of rest and get enough sleep at night. Take daytime naps or rest if you feel tired.    Limit physical activity and \"thinking\" activities. These can make symptoms worse.  ? Physical activity includes gym, sports, weight training, running, exercise " and heavy lifting.  ? Thinking activities include homework, class work, job-related work and screen time (phone, computer, tablet, TV and video games).    Stick to a healthy diet and drink lots of fluids.    As symptoms improve, you may slowly return to your daily activities. If symptoms get worse   or return, reduce your activity.    Know that it is normal to feel sad and frustrated when you do not feel right and are less active.  Going back to work    Your care team will tell you when you are ready to return to work.    Limit the amount of work you do soon after your injury. This may speed healing. Take breaks if your symptoms get worse. You should also reduce your physical activity as well as activities that require a lot of thinking until you see your doctor.    You may need shorter work days and a lighter workload.    Avoid heavy lifting, working with machinery, driving and working at heights until your symptoms are gone or you are cleared by a doctor.  Returning to sports    Never return to play if you have any symptoms. A full recovery will reduce the chances of getting hurt again. Remember, it is better to miss one or two games than a whole season.    You should rest from all physical activity until you see your doctor. Generally, if all symptoms have completely cleared, your doctor can help guide you to slowly return to sports. If symptoms return or worsen, stop the activity and see your doctor.    Important: If you are in an organized sport and under age 18, you will need written consent from a healthcare provider before you return to sports. Typically, this will be your primary care or sports medicine doctor. Please make an appointment.  Going back to school    If you are still having symptoms, you may need extra help at school.    Tell your teachers and school nurse about your injury and symptoms. Ask them to watch for problems with learning, memory and concentrating. Symptoms may get worse when you do  "schoolwork, and you may become more irritable.    You may need shorter school days, a reduced workload, and to postpone testing.    Do not drive or take gym class (physical activity) until cleared by a doctor.    For informational purposes only. Not to replace the advice of your health care provider.   2009 [employer]. Used with permission. This form is adapted from the \"Heads Up: Brain Injury in Your Practice\" tool kit developed by the Centers for Disease Control and Prevention (CDC). All rights reserved. [employer] 773449oh - Rev 03/17.    Concussion    A concussion can be caused by a direct blow to the head, neck, face, or somewhere else on the body with the force being transmitted to the head. This may cause you to lose consciousness - be \"knocked out\" - but not always. Depending on the severity of the blow, it will take from a few hours up to a few days to get better. Sometimes symptoms may last a few months or longer. This is called post-concussion syndrome.  At first, you may have a headache, nausea, vomiting, or dizziness. You may also have problems concentrating or remembering things. This is normal.  Symptoms should get better as the hours and days go by. Symptoms that get worse could be a sign of a more serious injury. This might be a bruise or bleeding in the brain. That s why it s important to watch for the warning signs listed below.  Home care  If your injury is mild and there are no serious signs or symptoms, your healthcare provider may recommend that you be monitored at home. If there is evidence that the injury is more serious, you will be monitored in the hospital. Follow these tips to help care for yourself at home:    After a concussion, your healthcare provider may recommend that a family member or friend monitor you for 12 to 24 hours. They may be told to wake you every few hours during sleep to check for the signs below.    If your " face or scalp swells, apply an ice pack for 20 minutes every 1 to 2 hours. Do this until the swelling starts to go down. You can make an ice pack by putting ice cubes in a plastic bag and wrapping the bag in a towel.    You may use acetaminophen to control pain, unless another pain medicine was prescribed. Do not use aspirin or ibuprofen after a head injury. If you have chronic liver or kidney disease, talk with your doctor before using these medicines. Also talk with your doctor if you ever had a stomach ulcer or gastrointestinal bleeding.    For the next 24 hours:  ? Don t drink alcohol or take sedatives or medicines that make you sleepy.  ? Don t drive or operate machinery.  ? Avoid doing anything strenuous. Don t lift or strain.    Don t return to sports or any activity that could cause you to hit your head until all symptoms are gone and you have been cleared by your doctor. A second head injury before fully recovering from the first one can lead to serious brain injury.    Avoid doing activities that require a lot of concentration or a lot of attention. This will allow your brain to rest and heal quicker.  Follow-up care  Follow up with your doctor in 1 week, or as directed.  Note: A radiologist will review any X-rays or CT scans that were taken. You will be told of any new findings that may affect your care.  When to seek medical advice  Call your healthcare provider right away if any of these occur:    Repeated vomiting    Headache or dizziness that is severe or gets worse    Loss of consciousness    Unusual drowsiness, or unable to wake up as usual    Weakness or decreased ability to walk or move any limb    Confusion, agitation, or change in behavior or speech, or memory loss    Blurred vision    Convulsion (seizure)    Swelling on the scalp or face that gets worse    Changes in pupil size (the black part of the eye)    Redness, warmth, or pus from the swollen area    Fluid draining from or bleeding from  the nose or ears     Date Last Reviewed: 8/14/2015 2000-2017 [employer]. [address] [place] [address], [place] [address]. All rights reserved. This information is not intended as a substitute for professional medical care. Always follow your healthcare professional's instructions.      Begin cold compressing the frontal scalp injury to minimize swelling and continued bleeding      AGUSTÍN Ruffin [facility]

## 2018-05-18 NOTE — PROGRESS NOTES
The CT scan of your head is negative for evidence of a bleed and the remainder of the findings are age appropriate changes of the brain.

## 2018-05-18 NOTE — PROGRESS NOTES
ANTICOAGULATION FOLLOW-UP CLINIC VISIT    Patient Name:  Bj Ponce  Date:  5/18/2018  Contact Type:  Face to Face    SUBJECTIVE:     Patient Findings     Positives Other complaints (fell and hit nose today, saw provider. bleeding has stopped )           OBJECTIVE    INR Protime   Date Value Ref Range Status   05/18/2018 2.8 (A) 0.86 - 1.14 Final       ASSESSMENT / PLAN  INR assessment THER    Recheck INR In: 2 WEEKS    INR Location Clinic      Anticoagulation Summary as of 5/18/2018     INR goal 2.0-3.0   Today's INR 2.8   Maintenance plan 7.5 mg (5 mg x 1.5) on Mon; 5 mg (5 mg x 1) all other days   Full instructions 7.5 mg on Mon; 5 mg all other days   Weekly total 37.5 mg   No change documented Janice Greer RN   Plan last modified Janny Haddad RN (4/24/2018)   Next INR check 6/5/2018   Target end date Indefinite    Indications   Intermittent atrial fibrillation (H) [I48.0]  Long-term (current) use of anticoagulants [Z79.01] [Z79.01]         Anticoagulation Episode Summary     INR check location [facility]    Preferred lab     Send INR reminders to [facility]AG[facility]    Comments [facility]; new to [facility] INR on 1/16/18- To clarify [facility] is different than Facility, ok to give Verbals for INR  Mireya at [facility]  Ph. [phone]       Anticoagulation Care Providers     Provider Role Specialty Phone number    Azar Zaldivar MD [facility] [phone]            See the Encounter Report to view Anticoagulation Flowsheet and Dosing Calendar (Go to Encounters tab in chart review, and find the Anticoagulation Therapy Visit)    Pt fell and hit nose today - saw Mandi, will be having CT scan   INR therapeutic - if not findings on CT will plan to continue same warfarin dosing and recheck INR on 6/5/18 (same day as VO with PCP)     Janice Greer, RN

## 2018-06-03 NOTE — TELEPHONE ENCOUNTER
"Per Mingo, Patient's friend.  Patient has been experiencing generalized weakness and has been having more frequent falls.   States that after each fall Patient has a feeling of \"how did I end up like this\".   Today was sitting on the couch and fell forward and hit his head on the coffee table. Has a lump on the head.  Does not know how he fell forward. Denies fainting.  History of a CVA on 11/25/2017.   Patient was able to get himself back up. Has eaten today. Was able to walk to the door to let his friend Mingo in. Is not confused.   Does not recall how he fell forward off of the couch.   Patient is denying any symptoms at present.    Protocol and care advice reviewed.   Caller states understanding of the recommended disposition. Advised to call 911 now. Caller states she will do that.   Advised to call back if further questions or concerns.       Reason for Disposition    Patient sounds very sick or weak to the triager    Additional Information    Negative: Difficult to awaken or acting confused  (e.g., disoriented, slurred speech)    Negative: [1] Fainted > 15 minutes ago AND [2] still feels too weak or dizzy to stand    Negative: [1] SEVERE weakness (i.e., unable to walk or barely able to walk, requires support) AND     [2] new onset or worsening    Protocols used: WEAKNESS (GENERALIZED) AND FATIGUE-ADULT-      "

## 2018-06-03 NOTE — ED AVS SNAPSHOT
Emergency Department    [address] [facility] [address]    Phone:  [phone]    Fax:  [phone]                                       Bj Ponce   MRN: [id number]    Department:   Emergency Department   Date of Visit:  6/3/2018           Patient Information     Date Of Birth          10/6/1929        Your diagnoses for this visit were:     Contusion of scalp, initial encounter        You were seen by Carri Richardson MD.      Follow-up Information     Follow up with Azar Zaldivar MD On 6/5/2018.    Specialty:  Internal Medicine    Contact information:    [address] [facility] [place]  [facility] [address]  [phone]          Follow up with  Emergency Department.    Specialty:  EMERGENCY MEDICINE    Why:  As needed, If symptoms worsen    Contact information:    [address] [facility] [id number] [phone]        Discharge Instructions         Scalp Contusion  A contusion is another word for bruise. It develops when small blood vessels break open and leak blood into the nearby area. A scalp contusion can result from a bump, hit, or fall. Symptoms can include changes in skin color (bruising). For instance, the skin may turn blue or black. Swelling and pain may also occur.  The swelling from the contusion should go down in a few days. Bruising and pain may take longer to go away.  Home care  General care    You may use acetaminophen to control pain, unless another pain medicine was prescribed. Don t take aspirin or NSAIDs (nonsteroidal anti-inflammatory drugs) or anticoagulants such as warfarin without talking to your provider first. These medicines increase the risk of bleeding.    To help reduce swelling and pain, apply a cold source to the injured area for up to 20 minutes at a time. Do this as often as directed. Use a cold pack or bag of ice wrapped in a thin towel. Never put a cold source directly on your skin.    If you have cuts or scrapes around  the site of the contusion, be sure to care for them as directed.  Note about concussion  Because the injury was to your head, it is possible that you could have a concussion (mild brain injury). Symptoms of a concussion can show up later. For this reason, be alert for symptoms of concussion once you re home. Seek emergency medical care if you have any of the symptoms below over the next hours to days:    Headache    Nausea or vomiting    Dizziness    Sensitivity to light or noise    Unusual sleepiness or grogginess    Trouble falling asleep    Personality changes    Vision changes    Memory loss    Confusion    Trouble walking or clumsiness    Loss of consciousness (even for a short time)    Inability to be awakened  During the time period that you re watching for concussion symptoms:    Don t drink alcohol or use sedatives or medicines that make you sleepy.    Don t drive or operate machinery.    Don t do anything strenuous, such as heavy lifting or straining.    Limit tasks that require concentration. This includes reading, watching TV, using a smartphone or computer, and playing video games.    Don t return to sports, exercise, or other activity that could result in another injury.  Ask your healthcare provider when you can safely resume these activities.   Follow-up care  Follow up with your healthcare provider, or as directed. If imaging tests were done, they will be reviewed by a doctor. You will be told the results and any new findings that may affect your care.  When to seek medical advice  Call your healthcare provider right away if any of these occur:    Pain that worsens or that can t be relieved with medicines    New or increased swelling or bruising    Fever of 100.4 F (38 C) or higher, or as directed by your healthcare provider    Redness, warmth, or drainage from the injured area    Any depression or bony abnormality in the injured area    Fluid drainage or bleeding from the nose or ears  Call  911  Call 911 right away if any of these occur:    Stiff neck    Weakness or numbness in any part of the body    Seizures  Date Last Reviewed: 7/1/2017 2000-2017 [employer]. [address] [place] [address], [place] [address]. All rights reserved. This information is not intended as a substitute for professional medical care. Always follow your healthcare professional's instructions.          Your next 10 appointments already scheduled     Jun 05, 2018 11:00 AM CDT   Office Visit with Azar Zaldivar MD   [facility]    [address] [facility] [id number] [phone]           Bring a current list of meds and any records pertaining to this visit. For Physicals, please bring immunization records and any forms needing to be filled out. Please arrive 10 minutes early to complete paperwork.            Jun 05, 2018 11:45 AM CDT   Anticoagulation Visit with  ANTICOAGULATION CLINIC   [facility]    [address] [facility] [id number] [phone]              24 Hour Appointment Hotline       To make an appointment at any [facility], call [phone] ([phone]). If you don't have a family doctor or clinic, we will help you find one. [facility] are conveniently located to serve the needs of you and your family.             Review of your medicines      Our records show that you are taking the medicines listed below. If these are incorrect, please call your family doctor or clinic.        Dose / Directions Last dose taken    aspirin 81 MG EC tablet   Dose:  81 mg   Quantity:  90 tablet        Take 1 tablet (81 mg) by mouth daily   Refills:  3        clotrimazole 1 % cream   Commonly known as:  LOTRIMIN        Apply topically 2 times daily Apply to feet and between toes   Refills:  0        clotrimazole-betamethasone cream   Commonly known as:  LOTRISONE   Quantity:  15 g        Apply topically 2 times daily    Refills:  1        losartan 100 MG tablet   Commonly known as:  COZAAR   Dose:  100 mg   Quantity:  90 tablet        Take 1 tablet (100 mg) by mouth daily   Refills:  3        metoprolol tartrate 25 MG tablet   Commonly known as:  LOPRESSOR   Dose:  50 mg   Quantity:  120 tablet        Take 2 tablets (50 mg) by mouth 2 times daily   Refills:  11        sertraline 50 MG tablet   Commonly known as:  ZOLOFT   Dose:  50 mg   Quantity:  90 tablet        Take 1 tablet (50 mg) by mouth daily   Refills:  1        torsemide 20 MG tablet   Commonly known as:  DEMADEX   Dose:  20 mg   Quantity:  90 tablet        Take 1 tablet (20 mg) by mouth daily   Refills:  3        warfarin 2.5 MG tablet   Commonly known as:  COUMADIN   Quantity:  180 tablet        TAKE 2-3 TABLETS DAILY (5-7.5 MG) BY MOUTH DAILY OR AS INSTRUCTED BY INR CLINIC   Refills:  0                Procedures and tests performed during your visit     Basic metabolic panel    CBC with platelets differential    CT Head w/o Contrast    INR      Orders Needing Specimen Collection     None      Pending Results     Date and Time Order Name Status Description    6/3/2018 1302 CT Head w/o Contrast Preliminary             Pending Culture Results     No orders found from 6/1/2018 to 6/4/2018.            Pending Results Instructions     If you had any lab results that were not finalized at the time of your Discharge, you can call the ED Lab Result RN at [phone]. You will be contacted by this team for any positive Lab results or changes in treatment. The nurses are available 7 days a week from 10A to 6:30P.  You can leave a message 24 hours per day and they will return your call.        Test Results From Your Hospital Stay        6/3/2018  1:07 PM      Component Results     Component Value Ref Range & Units Status    INR 1.49 (H) 0.86 - 1.14 Final         6/3/2018  1:14 PM      Component Results     Component Value Ref Range & Units Status    Sodium 142 133 - 144 mmol/L  Final    Potassium 4.6 3.4 - 5.3 mmol/L Final    Chloride 109 94 - 109 mmol/L Final    Carbon Dioxide 28 20 - 32 mmol/L Final    Anion Gap 5 3 - 14 mmol/L Final    Glucose 96 70 - 99 mg/dL Final    Urea Nitrogen 49 (H) 7 - 30 mg/dL Final    Creatinine 1.95 (H) 0.66 - 1.25 mg/dL Final    GFR Estimate 33 (L) >60 mL/min/1.7m2 Final    Non  GFR Calc    GFR Estimate If Black 39 (L) >60 mL/min/1.7m2 Final    African American GFR Calc    Calcium 9.6 8.5 - 10.1 mg/dL Final         6/3/2018 12:57 PM      Component Results     Component Value Ref Range & Units Status    WBC 6.1 4.0 - 11.0 10e9/L Final    RBC Count 3.78 (L) 4.4 - 5.9 10e12/L Final    Hemoglobin 11.7 (L) 13.3 - 17.7 g/dL Final    Hematocrit 35.7 (L) 40.0 - 53.0 % Final    MCV 94 78 - 100 fl Final    MCH 31.0 26.5 - 33.0 pg Final    MCHC 32.8 31.5 - 36.5 g/dL Final    RDW 16.3 (H) 10.0 - 15.0 % Final    Platelet Count 249 150 - 450 10e9/L Final    Diff Method Automated Method  Final    % Neutrophils 77.9 % Final    % Lymphocytes 14.8 % Final    % Monocytes 5.9 % Final    % Eosinophils 1.0 % Final    % Basophils 0.2 % Final    % Immature Granulocytes 0.2 % Final    Nucleated RBCs 0 0 /100 Final    Absolute Neutrophil 4.8 1.6 - 8.3 10e9/L Final    Absolute Lymphocytes 0.9 0.8 - 5.3 10e9/L Final    Absolute Monocytes 0.4 0.0 - 1.3 10e9/L Final    Absolute Eosinophils 0.1 0.0 - 0.7 10e9/L Final    Absolute Basophils 0.0 0.0 - 0.2 10e9/L Final    Abs Immature Granulocytes 0.0 0 - 0.4 10e9/L Final    Absolute Nucleated RBC 0.0  Final         6/3/2018  2:14 PM      Narrative     CT OF THE HEAD WITHOUT CONTRAST 6/3/2018 2:02 PM     COMPARISON: Head CT 5/18/2018.    HISTORY: On coumadin, fall onto right forehead.    TECHNIQUE: 5 mm thick axial CT images of the head were acquired  without IV contrast material.    FINDINGS: There is moderate diffuse cerebral volume loss. There are  extensive confluent areas of decreased density in the cerebral white  matter  bilaterally that are consistent with sequela of chronic small  vessel ischemic disease.    The ventricles and basal cisterns are within normal limits in  configuration given the degree of cerebral volume loss.  There is no  midline shift. There are no extra-axial fluid collections.    No intracranial hemorrhage, mass or recent infarct.    The visualized paranasal sinuses are well-aerated. There is no  mastoiditis. There are no fractures of the visualized bones.        Impression     IMPRESSION: Diffuse cerebral volume loss and cerebral white matter  changes consistent with chronic small vessel ischemic disease. No  evidence for acute intracranial pathology.    Radiation dose for this scan was reduced using automated exposure  control, adjustment of the mA and/or kV according to patient size, or  iterative reconstruction technique                Clinical Quality Measure: Blood Pressure Screening     Your blood pressure was checked while you were in the emergency department today. The last reading we obtained was  BP: 146/86 . Please read the guidelines below about what these numbers mean and what you should do about them.  If your systolic blood pressure (the top number) is less than 120 and your diastolic blood pressure (the bottom number) is less than 80, then your blood pressure is normal. There is nothing more that you need to do about it.  If your systolic blood pressure (the top number) is 120-139 or your diastolic blood pressure (the bottom number) is 80-89, your blood pressure may be higher than it should be. You should have your blood pressure rechecked within a year by a primary care provider.  If your systolic blood pressure (the top number) is 140 or greater or your diastolic blood pressure (the bottom number) is 90 or greater, you may have high blood pressure. High blood pressure is treatable, but if left untreated over time it can put you at risk for heart attack, stroke, or kidney failure. You should  "have your blood pressure rechecked by a primary care provider within the next 4 weeks.  If your provider in the emergency department today gave you specific instructions to follow-up with your doctor or provider even sooner than that, you should follow that instruction and not wait for up to 4 weeks for your follow-up visit.        Thank you for choosing [facility]       Thank you for choosing [facility] for your care. Our goal is always to provide you with excellent care. Hearing back from our patients is one way we can continue to improve our services. Please take a few minutes to complete the written survey that you may receive in the mail after you visit with us. Thank you!        [employer] Information     [employer] lets you send messages to your doctor, view your test results, renew your prescriptions, schedule appointments and more. To sign up, go to [email or web address] . Click on \"Log in\" on the left side of the screen, which will take you to the Welcome page. Then click on \"Sign up Now\" on the right side of the page.     You will be asked to enter the access code listed below, as well as some personal information. Please follow the directions to create your username and password.     Your access code is: [email or web address]  Expires: 2018  1:08 PM     Your access code will  in 90 days. If you need help or a new code, please call your [facility] clinic or [phone].        Care EveryWhere ID     This is your Care EveryWhere ID. This could be used by other organizations to access your [facility] medical records  [id number]        Equal Access to Services     REGGIE MERA : Hadii adarsh ku hadasho Sokatyali, waaxda luqadaha, qaybta kaalmada adeegyada, rc reynaga. So [facility] [phone].    ATENCIÓN: Si habla español, tiene a clifford disposición servicios gratuitos de asistencia lingüística. Llame al [phone].    We comply with applicable federal civil rights laws and [place] laws. We " do not discriminate on the basis of race, color, national origin, age, disability, sex, sexual orientation, or gender identity.            After Visit Summary       This is your record. Keep this with you and show to your community pharmacist(s) and doctor(s) at your next visit.

## 2018-06-03 NOTE — ED AVS SNAPSHOT
Emergency Department    [address] [facility] [address]    Phone:  [phone]    Fax:  [phone]                                       Bj Ponce   MRN: [id number]    Department:   Emergency Department   Date of Visit:  6/3/2018           After Visit Summary Signature Page     I have received my discharge instructions, and my questions have been answered. I have discussed any challenges I see with this plan with the nurse or doctor.    ..........................................................................................................................................  Patient/Patient Representative Signature      ..........................................................................................................................................  Patient Representative Print Name and Relationship to Patient    ..................................................               ................................................  Date                                            Time    ..........................................................................................................................................  Reviewed by Signature/Title    ...................................................              ..............................................  Date                                                            Time

## 2018-06-03 NOTE — ED NOTES
Bed: ED21  Expected date:   Expected time:   Means of arrival:   Comments:  [facility] [place] [facility] -  88M fall head abrasion eta 1217

## 2018-06-03 NOTE — DISCHARGE INSTRUCTIONS
Scalp Contusion  A contusion is another word for bruise. It develops when small blood vessels break open and leak blood into the nearby area. A scalp contusion can result from a bump, hit, or fall. Symptoms can include changes in skin color (bruising). For instance, the skin may turn blue or black. Swelling and pain may also occur.  The swelling from the contusion should go down in a few days. Bruising and pain may take longer to go away.  Home care  General care    You may use acetaminophen to control pain, unless another pain medicine was prescribed. Don t take aspirin or NSAIDs (nonsteroidal anti-inflammatory drugs) or anticoagulants such as warfarin without talking to your provider first. These medicines increase the risk of bleeding.    To help reduce swelling and pain, apply a cold source to the injured area for up to 20 minutes at a time. Do this as often as directed. Use a cold pack or bag of ice wrapped in a thin towel. Never put a cold source directly on your skin.    If you have cuts or scrapes around the site of the contusion, be sure to care for them as directed.  Note about concussion  Because the injury was to your head, it is possible that you could have a concussion (mild brain injury). Symptoms of a concussion can show up later. For this reason, be alert for symptoms of concussion once you re home. Seek emergency medical care if you have any of the symptoms below over the next hours to days:    Headache    Nausea or vomiting    Dizziness    Sensitivity to light or noise    Unusual sleepiness or grogginess    Trouble falling asleep    Personality changes    Vision changes    Memory loss    Confusion    Trouble walking or clumsiness    Loss of consciousness (even for a short time)    Inability to be awakened  During the time period that you re watching for concussion symptoms:    Don t drink alcohol or use sedatives or medicines that make you sleepy.    Don t drive or operate machinery.    Don t do  anything strenuous, such as heavy lifting or straining.    Limit tasks that require concentration. This includes reading, watching TV, using a smartphone or computer, and playing video games.    Don t return to sports, exercise, or other activity that could result in another injury.  Ask your healthcare provider when you can safely resume these activities.   Follow-up care  Follow up with your healthcare provider, or as directed. If imaging tests were done, they will be reviewed by a doctor. You will be told the results and any new findings that may affect your care.  When to seek medical advice  Call your healthcare provider right away if any of these occur:    Pain that worsens or that can t be relieved with medicines    New or increased swelling or bruising    Fever of 100.4 F (38 C) or higher, or as directed by your healthcare provider    Redness, warmth, or drainage from the injured area    Any depression or bony abnormality in the injured area    Fluid drainage or bleeding from the nose or ears  Call 911  Call 911 right away if any of these occur:    Stiff neck    Weakness or numbness in any part of the body    Seizures  Date Last Reviewed: 7/1/2017 2000-2017 [employer]. [address] [place] [address]. All rights reserved. This information is not intended as a substitute for professional medical care. Always follow your healthcare professional's instructions.

## 2018-06-03 NOTE — ED PROVIDER NOTES
History     Chief Complaint:  Fall    HPI   Bj Ponce is a 88 year old male on Coumadin who presents to the emergency department today via EMS for evaluation of fall. The patient reports he was sitting on the couch when he fell forward and hit his head on the coffee table before landing on the ground. The patient was able to get himself up afterwards, but sustained an abrasion to the right forehead. The family reports the patient has a scheduled appointment with his primary with INR check 2 days from now, however given the fall she called the primary clinic today, of which the nurse recommended calling 911. The family reports the patient has had increased instances of sitting slouched forward on the couch since his stroke on 11/25/17. The patient has also had increased falls similar to today in the past several weeks. The family reports the patient's last CT scan of his head was on 5/8/18.    Allergies:  No Known Drug Allergies     Medications:    Aspirin   Lotrimin   Lotrisone  Cozaar  Lopressor  Zoloft   Demadex  Coumadin     Past Medical History:    Atrial fibrillation   Anxiety   ASCVD (arteriosclerotic cardiovascular disease)   Cardiomyopathy   CKD (chronic kidney disease) stage 4, GFR 15-29 ml/min   CVA (cerebral vascular accident)   Genital herpes   HTN (hypertension)   Hypercholesteremia   Pseudogout   PVD (peripheral vascular disease) with claudication    Past Surgical History:    Cataract IOL  Coronary artery bypass  Hydrocelectomy scrotal   Tonsillectomy    TURP     Family History:    Breast cancer    Social History:  The patient was accompanied to the ED by family.   Smoking Status: Former Smoker, Quit: 4/16/1954  Smokeless Tobacco: Never Used  Alcohol Use: Negative    Marital Status:   [4]     Review of Systems   Constitutional:        Fall   Skin:        Abrasion to right forehead   All other systems reviewed and are negative.    Physical Exam     Patient Vitals for the past 24 hrs:   BP  "Temp Temp src Heart Rate Resp SpO2 Height Weight   06/03/18 1424 146/86 - - - - 97 % - -   06/03/18 1234 159/86 97.7  F (36.5  C) Oral 69 20 96 % 1.753 m (5' 9\") 57.6 kg (127 lb)       Physical Exam  General/Appearance: appears stated age, well-groomed, appears comfortable  Face: 2\"x2\" abrasion with mild hematoma over R forehead  Eyes: EOMI, no scleral injection, no icterus  ENT: MMM  Neck: supple, nl ROM, no stiffness  Cardiovascular: RRR, nl S1S2, no m/r/g, 2+ pulses in all 4 extremities, cap refill <2sec  Respiratory: CTAB, good air movement throughout, no wheezes/rhonchi/rales, no increased WOB, no retractions  Back: no lesions  GI: abd soft, non-distended, nttp,  no HSM, no rebound, no guarding, nl BS  MSK: GRULLON, good tone, no bony abnormality  Skin: warm and well-perfused, no rash, no edema, no ecchymosis, nl turgor  Neuro: GCS 15, alert and oriented, no gross focal neuro deficits  Psych: interacts appropriately  Heme: no petechia, no purpura, no active bleeding    Emergency Department Course     Imaging:  Radiology findings were communicated with the patient who voiced understanding of the findings.    CT Head w/o Contrast  Diffuse cerebral volume loss and cerebral white matter changes consistent with chronic small vessel ischemic disease. No evidence for acute intracranial pathology.  Reading per radiology    Laboratory:  Laboratory findings were communicated with the patient who voiced understanding of the findings.    INR: 1.49 (H)  BMP: BUN: 49 (H), Creatinine: 1.95 (H), GFR Estimate: 33 (L), o/w WNL  CBC: WBC 6.1, HGB 11.7 (L),     Interventions:  1502 Coumadin 7.5 mg PO    Emergency Department Course:    1230 IV was inserted and blood was drawn for laboratory testing, results above.    1234 Nursing notes and vitals reviewed.    1254 I performed an exam of the patient as documented above.     1351 The patient was sent for a CT Head w/o Contrast while in the emergency department, results above. "     1418 I personally reviewed the imaging and laboratory results with the patient and answered all related questions prior to discharge. I discussed the treatment plan with the patient. He expressed understanding of this plan and consented to discharge. He will be discharged home with instructions for care and follow up. In addition, the patient will return to the emergency department if his symptoms persist, worsen, if new symptoms arise or if there is any concern.  All questions were answered.    Impression & Plan      Medical Decision Making:  Bj Ponce is a 88 year old male who presents to the emergency department today for evaluation of a forehead contusion after a forward fall.  He is on Coumadin. He states over the past several weeks there has been several times when he is falling forward, striking his head. Because of the visible injury to his forehead, a CT was obtained which thankfully was negative. INR here is subtherapeutic so I am giving him an additional dose of his Coumadin today. He does have an INR check in 2 days from now and I have reconfirmed the need to go to that appointment. He also is supposed to see his PCP that day as well. I had quite a long talk with him and his friend at the bedside regarding the benefits and risks of Coumadin. When he was put on it he just had quite a large stroke. I think it was very reasonable at that time for him to be started on it. As he now is beginning to have multiple falls, I think it may be reasonable for him to be aware of the risks and benefits of Coumadin. At some point I explained it may be reasonable to consider discontinuing Coumadin if the risks of a concerning and dangerous/life-threatening head bleed started to outweigh the benefits of this. As I explained to him I am not in a position to help him make this decision/determine when that risks/benefits scale tips, however I strongly encouraged him to have a discussion with his primary care  provider in 2 days and make sure he/she is aware of the falls he has been having.    Diagnosis:    ICD-10-CM    1. Contusion of scalp, initial encounter S00.03XA      Disposition:   The patient is discharged to home.    Scribe Disclosure:  I, Jessy Kaplan, am serving as a scribe at 12:39 PM on 6/3/2018 to document services personally performed by Carri Richardson MD based on my observations and the provider's statements to me.     EMERGENCY DEPARTMENT       Carri Richardson MD  06/04/18 9454

## 2018-06-04 NOTE — PROGRESS NOTES
SUBJECTIVE:   Bj Ponce is a 88 year old male who presents to clinic today for the following health issues:      ED/UC Followup:    Facility:  [facility]  Date of visit: 6/3/18  Reason for visit: CONTUSION OF SCALP  Current Status: out of er     The patient is here with his friend for follow-up on frequent falls.  The patient has had multiple falls in the last month or 2 as noted.  He was recently in the emergency room.  2 head CTs have been done with no evidence of bleeding.  The patient is on Coumadin as noted and he has multiple medical problems as noted.    None of the falls are due to dizziness or syncope.  At least 1 or 2 have been simply because he leans forward to find the couch and then falls over.  Another couple of been he loses his balance.  However, none are due to chest pain or dizziness.  He otherwise feels great.  His weight is stable.  He has no clear vascular, respiratory, GI or  complaints.  He is taking his medications regularly.     Past Medical History:   Diagnosis Date     afib 2004, 2010, 2012    post op and then again 2010, 2012     Anxiety 01/2018    added zoloft     ASCVD (arteriosclerotic cardiovascular disease) 2004    angio for cp and 2 vessel dz, cabg done 2004, post op afib     Cardiomyopathy (H) 11/2017    found on echo done during cvi in [facility]; echo 1/18 here with ef 38%, global hypokinesis     CKD (chronic kidney disease) stage 4, GFR 15-29 ml/min (H)      CVA (cerebral vascular accident) (H) 11/25/2017    L MCA embolism, received TPA. No residual deficits, add coumadin     Genital herpes      HTN (hypertension)      Hypercholesteremia      Pseudogout      PVD (peripheral vascular disease) with claudication (H)      Past Surgical History:   Procedure Laterality Date     CATARACT IOL, RT/LT  2006     CORONARY ARTERY BYPASS  2004     HYDROCELECTOMY SCROTAL  1997     TONSILLECTOMY       TURP  1997     Social History     Social History     Marital status:      Spouse  "name: N/A     Number of children: 3     Years of education: N/A     Occupational History           Social History Main Topics     Smoking status: Former Smoker     Quit date: 4/16/1954     Smokeless tobacco: Never Used     Alcohol use No     Drug use: No     Sexual activity: Not Currently     Other Topics Concern     Not on file     Social History Narrative     Current Outpatient Prescriptions   Medication Sig Dispense Refill     aspirin EC 81 MG EC tablet Take 1 tablet (81 mg) by mouth daily 90 tablet 3     clotrimazole (LOTRIMIN) 1 % cream Apply topically 2 times daily Apply to feet and between toes       clotrimazole-betamethasone (LOTRISONE) cream Apply topically 2 times daily 15 g 1     losartan (COZAAR) 100 MG tablet Take 1 tablet (100 mg) by mouth daily 90 tablet 3     metoprolol tartrate (LOPRESSOR) 25 MG tablet Take 2 tablets (50 mg) by mouth 2 times daily 120 tablet 11     sertraline (ZOLOFT) 50 MG tablet Take 1 tablet (50 mg) by mouth daily 90 tablet 1     torsemide (DEMADEX) 20 MG tablet Take 1 tablet (20 mg) by mouth daily 90 tablet 3     warfarin (COUMADIN) 2.5 MG tablet TAKE 2-3 TABLETS DAILY (5-7.5 MG) BY MOUTH DAILY OR AS INSTRUCTED BY INR CLINIC 180 tablet 0     No Known Allergies  FAMILY HISTORY NOTED AND REVIEWED    REVIEW OF SYSTEMS: above    PHYSICAL EXAM    /65  Pulse 68  Temp 97.4  F (36.3  C) (Oral)  Ht 5' 9\" (1.753 m)  Wt 128 lb (58.1 kg)  SpO2 100%  BMI 18.9 kg/m2    Patient appears non toxic  Blood pressure as noted not significantly ortho  Has healing bruises on forehead  Mouth and eyes within normal limits  Neck within normal limits  No supraclavicular, cervical or axillary lymphadenopathy  Lungs clear  cv irreg irreg 2/6 sm, no jvp, trace bilat edema  Abdomen, non-tender, no mgr, no hepatosplenomegaly  His gait is somewhat slow and a little bit shuffling but appears fairly stable.  He turns a bit fast.    Labs noted from emergency room    ASSESSMENT:  1. " Frequent falls, I suspect due to frailty and lack of muscle mass, not due to neuro or cv event.  meds may be contributing as well  2. Prior embolic cvi, afib, on coum.  At this point I believe the risk of Coumadin outweighs its benefit and until we are more certain he will not fall I think we had better stop it.  3. Prior cvi, stable  4. afib  5. Chf, stable  6. Pvd, stable  7. Low weight, stable  8. Hypertension, controlled    PLAN:  Dc coum for now  Balance therapy eval and treat  Change demadex to 10mg  Change losartan to 50mg  Follow up 4 weeks    Azar Zaldivar M.D.

## 2018-06-05 PROBLEM — E46 PROTEIN-CALORIE MALNUTRITION (H): Status: ACTIVE | Noted: 2018-01-01

## 2018-06-05 NOTE — PATIENT INSTRUCTIONS
1. Stop the warfarin    2. Change the dose of the torsemide to 10mg daily    3. Change the dose of the losartan to 50mg daily    4. See the balance therapist at [facility]    5. See me in 1 month    Azar Zaldivar M.D.

## 2018-06-05 NOTE — MR AVS SNAPSHOT
After Visit Summary   6/5/2018    Bj Ponce    MRN: [id number]           Patient Information     Date Of Birth          10/6/1929        Visit Information        Provider Department      6/5/2018 11:00 AM Azar Zaldivar MD [facility]        Today's Diagnoses     Falls frequently    -  1    Intermittent atrial fibrillation (H)        Cerebrovascular accident (CVA) due to embolism of middle cerebral artery, unspecified blood vessel laterality (H)        Dilated cardiomyopathy (H)        PVD (peripheral vascular disease) with claudication (H)        CKD (chronic kidney disease) stage 4, GFR 15-29 ml/min (H)        Protein-calorie malnutrition, unspecified severity (H)        Benign essential hypertension        ASCVD (arteriosclerotic cardiovascular disease)          Care Instructions    1. Stop the warfarin    2. Change the dose of the torsemide to 10mg daily    3. Change the dose of the losartan to 50mg daily    4. See the balance therapist at [facility]    5. See me in 1 month    Azar Zaldivar M.D.            Follow-ups after your visit        Additional Services     PHYSICAL THERAPY REFERRAL       Balance therapy for frequent falls, eval and treat    Azar Zaldivar M.D.                  Your next 10 appointments already scheduled     Jun 05, 2018 11:45 AM CDT   Anticoagulation Visit with  ANTICOAGULATION CLINIC   [facility]    [address]  [place] [address] [phone]              Who to contact     If you have questions or need follow up information about today's clinic visit or your schedule please contact [facility] directly at [phone].  Normal or non-critical lab and imaging results will be communicated to you by MyChart, letter or phone within 4 business days after the clinic has received the results. If you do not hear from us within 7 days, please contact the clinic through MyChart or phone. If you have a  "critical or abnormal lab result, we will notify you by phone as soon as possible.  Submit refill requests through [employer] or call your pharmacy and they will forward the refill request to us. Please allow 3 business days for your refill to be completed.          Additional Information About Your Visit        MyChart Information     [employer] lets you send messages to your doctor, view your test results, renew your prescriptions, schedule appointments and more. To sign up, go to [email or web address] . Click on \"Log in\" on the left side of the screen, which will take you to the Welcome page. Then click on \"Sign up Now\" on the right side of the page.     You will be asked to enter the access code listed below, as well as some personal information. Please follow the directions to create your username and password.     Your access code is: [email or web address]  Expires: 2018  1:08 PM     Your access code will  in 90 days. If you need help or a new code, please call your [place] clinic or [phone].        Care EveryWhere ID     This is your Care EveryWhere ID. This could be used by other organizations to access your [place] medical records  [id number]        Your Vitals Were     Pulse Temperature Height Pulse Oximetry BMI (Body Mass Index)       68 97.4  F (36.3  C) (Oral) 5' 9\" (1.753 m) 100% 18.9 kg/m2        Blood Pressure from Last 3 Encounters:   18 126/65   18 146/86   18 127/63    Weight from Last 3 Encounters:   18 128 lb (58.1 kg)   18 127 lb (57.6 kg)   18 127 lb 14.4 oz (58 kg)              We Performed the Following     PHYSICAL THERAPY REFERRAL        Primary Care Provider Office Phone # Fax #    Azar Zaldivar [phone] [address]  [place] [address]        Equal Access to Services     LALITHA MERA AH: Noam Reinoso, jaquelin lora, qaybta kaalmada adeegrc navarro. So " [facility] [phone].    ATENCIÓN: Si poli chi, tiene a clifford disposición servicios gratuitos de asistencia lingüística. Kenny valdivia [phone].    We comply with applicable federal civil rights laws and [place] laws. We do not discriminate on the basis of race, color, national origin, age, disability, sex, sexual orientation, or gender identity.            Thank you!     Thank you for choosing [facility]  for your care. Our goal is always to provide you with excellent care. Hearing back from our patients is one way we can continue to improve our services. Please take a few minutes to complete the written survey that you may receive in the mail after your visit with us. Thank you!             Your Updated Medication List - Protect others around you: Learn how to safely use, store and throw away your medicines at www.disposemymeds.org.          This list is accurate as of 6/5/18 11:40 AM.  Always use your most recent med list.                   Brand Name Dispense Instructions for use Diagnosis    aspirin 81 MG EC tablet     90 tablet    Take 1 tablet (81 mg) by mouth daily    Cerebrovascular accident (CVA), unspecified mechanism (H)       clotrimazole 1 % cream    LOTRIMIN     Apply topically 2 times daily Apply to feet and between toes    Cerebrovascular accident (CVA), unspecified mechanism (H)       clotrimazole-betamethasone cream    LOTRISONE    15 g    Apply topically 2 times daily        losartan 100 MG tablet    COZAAR    90 tablet    Take 1 tablet (100 mg) by mouth daily    Cerebrovascular accident (CVA), unspecified mechanism (H)       metoprolol tartrate 25 MG tablet    LOPRESSOR    120 tablet    Take 2 tablets (50 mg) by mouth 2 times daily    Cerebrovascular accident (CVA), unspecified mechanism (H)       sertraline 50 MG tablet    ZOLOFT    90 tablet    Take 1 tablet (50 mg) by mouth daily    Anxiety       torsemide 20 MG tablet    DEMADEX    90 tablet    Take 1 tablet (20 mg) by mouth daily     Benign essential hypertension       warfarin 2.5 MG tablet    COUMADIN    180 tablet    TAKE 2-3 TABLETS DAILY (5-7.5 MG) BY MOUTH DAILY OR AS INSTRUCTED BY INR CLINIC    Cerebrovascular accident (CVA), unspecified mechanism (H)

## 2018-06-06 NOTE — TELEPHONE ENCOUNTER
Spoke with Kapil (Consent to Communicate on file):     Relayed in detail Dr. Zaldivar's notes from [facility] and indications for d/c'ing coumadin at this time (risks vs benefits with recent increase in falls), as well as plans for working on balance and stability through PT (which pt already has scheduled). They do plan to f/u in 1 month as advised, appt has already been scheduled.     Mingo was very understanding and appreciative of plan, and will call with any further questions or concerns,     Krystle ERNANDEZ RN

## 2018-06-06 NOTE — TELEPHONE ENCOUNTER
Mingo called with one more question: she would like to know if now that Bj is not going to take the Coumadin, will he be more susceptible to a stroke?  Please call Mingo back at [phone]

## 2018-06-06 NOTE — TELEPHONE ENCOUNTER
Reason for Call:  Other Question    Detailed comments:   Kapil called in   Number of years, Dr. Zaldivar had suggested Jayden take Warfarin to prevent stroke.  He had stroke 11/25/2017.  Now he was taking Coumadin, which was removed yesterday at his appt with Dr. Zaldivar.  Dr. Zaldivar wanted pt on Coumadin for stroke prevention, which he had and now he has been taken off.  Kapil's concern is why remove him from it now.  Can someone please advise from here.      Phone Number Patient can be reached at:   Kapil: [phone]    Best Time: Any    Can we leave a detailed message on this number? YES    Call taken on 6/6/2018 at 9:46 AM by Deb Spears

## 2018-06-06 NOTE — TELEPHONE ENCOUNTER
Yes, higher risk of cvi but I believe risk of bleed from coumadin it too high right now    Azar Zaldivar M.D.

## 2018-06-10 NOTE — TELEPHONE ENCOUNTER
Patient's significant other calling back because she hasn't heard from the nurse/doctor yet.  Explained that the MD was being paged again and that someone would call them back.   Kori Hartman RN  [facility]

## 2018-06-10 NOTE — TELEPHONE ENCOUNTER
Additional Information    Negative: [1] Caller is not with the adult (patient) AND [2] reporting urgent symptoms    Negative: Lab result questions    Negative: Medication questions    Negative: Caller cannot be reached by phone    Negative: Caller has already spoken to PCP or another triager    Negative: RN needs further essential information from caller in order to complete triage    Negative: Requesting regular office appointment    Negative: [1] Caller requesting NON-URGENT health information AND [2] PCP's office is the best resource    Negative: Health Information question, no triage required and triager able to answer question    Negative: General information question, no triage required and triager able to answer question    Negative: Question about upcoming scheduled test, no triage required and triager able to answer question    Negative: [1] Caller is not with the adult (patient) AND [2] probable NON-URGENT symptoms    [1] Follow-up call to recent contact AND [2] information only call, no triage required    Protocols used: INFORMATION ONLY CALL-ADULT-CHAYA Govea (friend) calls and says that pt. Just came home, from [facility], because he fell yesterday, and hit the back of his head. Xuan says that pt. Feels lousy and is exhausted. Denies any pain. Pt. Took a baby ASA this am and wants to take 2 Ibuprofen pills now. Pt. Wants to know if he can take the Ibuprofen pills. Pt's Coumadin was stopped. Dr. Zamora-BILL Wright-was then paged, to call this nurse, at: [phone], at: [phone], via page .
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
## 2018-06-11 NOTE — H&P
Admitted:     06/11/2018      CHIEF COMPLAINT:  Recurrent falls.  History obtained from patient's significant other, Mingo who was in the room, as well as extensive chart review.      HISTORY OF PRESENT ILLNESS:  Bj Ponce is an 88-year-old male with past medical history of coronary artery disease with a history of a 2-vessel bypass surgery in 2004, atrial fibrillation, cardiomyopathy of unclear etiology and previous MCA CVA in 11/2017, who presented from his primary care provider's office for evaluation of recurrent falls.  The patient has had issues with recurrent falls for the past month.  He has had several ED visits as well as an observation stay at [facility].  Historically, he is anticoagulated with Coumadin; however, his primary care provider discontinued the Coumadin approximately 10 days ago due to the recurrent falls.  He was actually just hospitalized at [facility] for 48 hours and discharged yesterday for a similar presentation.  The patient has had several Falls where he simply falls over.  Sometimes he is sitting in a chair or using the toilet.  Unfortunately, none of these falls have been witnessed.  It is really unclear if these may be related to syncope or are more mechanical in nature.      At his [facility] hospitalization, CT head, CT neck were obtained and were negative.  Laboratory evaluation revealed a stable hemoglobin with no leukocytosis.  Electrolytes were unremarkable.  He underwent a carotid ultrasound which revealed a 50% stenosis on the right and 50-69% stenosis on the left.  Telemetry revealed no significant arrhythmias.  It was recommended that the patient have an echocardiogram, a Holter monitor and a sleep study after discharge and he elected to follow up with his primary care provider.  Additionally, physical therapy was consulted and recommended discharge to TCU; however, given his stay was observation TCU would have been private pay, and he elected to discharge  home with home care.      Last night apparently, he went to bed feeling at baseline and awoke on the floor after a crash.  He is really unclear how this happened.  Today, he presented to his primary care provider's office and given his recurrent falls and the fact that he lives alone, it was felt that patient be admitted for further evaluation.      Presently, the was evaluated in the hospital room with his significant other at bedside.  She notes a significant decline over the past month.  He has been much more sleepy.  Again, no one has witnessed these falls.  He had a stroke after Thanksgiving while visiting family out [facility].  This was a left MCA stroke.  He received TPA.  Additionally, during that hospitalization, he was found to have new cardiomyopathy with an ejection fraction of 35-40%.  On returning to [place], his primary care provider repeated his echocardiogram which did reveal an ejection fraction of 38% and global hypokinesis of the left ventricle; however, patient has not followed with Cardiology since 2011.  He denies any dizziness or lightheadedness.  No lower extremity swelling.  No shortness of breath.  No fevers or chills.  He has had diminished appetite for the past several weeks.      PAST MEDICAL HISTORY:   1.  Atrial fibrillation, not on anticoagulation as of recently due to falls.   2.  Coronary artery disease, status post 2-vessel bypass in 2004.   3.  Cardiomyopathy, etiology unclear.  Diagnosed in fall of 2017.  No further workup was obtained.  Ejection fraction noted to be 38%.   4.  Chronic kidney disease, stage 4, baseline creatinine 2-2.3.   5.  Anxiety.   6.  Dyslipidemia, not on a statin due to elevated LFTs.   7.  Pseudogout.   8.  Peripheral vascular disease.      PRIOR TO ADMISSION MEDICATIONS:  Prescriptions Prior to Admission   Medication Sig Dispense Refill Last Dose     aspirin EC 81 MG EC tablet Take 1 tablet (81 mg) by mouth daily 90 tablet 3 6/10/2018 at Unknown time      clotrimazole (LOTRIMIN) 1 % cream Apply topically 2 times daily Apply to feet and between toes   6/10/2018 at Unknown time     clotrimazole-betamethasone (LOTRISONE) cream Apply topically 2 times daily 15 g 1 6/10/2018 at Unknown time     LOSARTAN POTASSIUM PO Take 50 mg by mouth daily   6/10/2018 at Unknown time     sertraline (ZOLOFT) 50 MG tablet Take 1 tablet (50 mg) by mouth daily 90 tablet 1 6/10/2018 at Unknown time     TORSEMIDE PO Take 10 mg by mouth daily   6/10/2018 at Unknown time     metoprolol tartrate (LOPRESSOR) 25 MG tablet Take 2 tablets (50 mg) by mouth 2 times daily 120 tablet 11 Taking        PAST SURGICAL HISTORY:   1.  Bypass surgery.   2.  Angiogram.   3.  TURP.   4.  Cataract.   5.  Tonsillectomy.      FAMILY HISTORY:  Sister had breast cancer.      SOCIAL HISTORY:  He is a former smoker, quit in the 1950s.  Does not drink alcohol.  Lives in an independent apartment.  He has a significant other of 30 years, but they do not live together.      REVIEW OF SYSTEMS:  A 10-point review of systems was completed.  Pertinent positives are in HPI, all other systems negative.      PHYSICAL EXAMINATION:   GENERAL:  Bj Ponce is a frail-appearing 80-year-old male who is lying in bed.   VITAL SIGNS:  Blood pressure is 142/70, pulse 69, temp 97.6.   HEAD:  Normocephalic and atraumatic.   CARDIOVASCULAR:  Irregular, but rate controlled.   PULMONARY:  Normal effort.  Lungs are clear.   ABDOMEN:  Soft, nontender.   EXTREMITIES:  Lower extremities without edema.   NEUROLOGIC:  Alert and oriented.  Cranial nerves II-XII are grossly intact.      LABORATORY DATA:  Stat BMP and CBC and INR are pending.  EKG shows atrial flutter with controlled ventricular rate.  Head CT is pending.      ASSESSMENT:  Bj Ponce is an 88-year-old male with past medical history of coronary artery disease, atrial fibrillation and 1 month of increasing falls versus syncope, who is admitted directly from clinic for evaluation of  recurrent falls.   1.  Recurrent falls versus syncope:  Unfortunately, none of these falls have been witnessed.  The patient feels as though he possibly does have a loss of consciousness.  He was recently hospitalized at [facility] on observation, just discharged yesterday.  Laboratory evaluation at that time was unremarkable.  Telemetry showed atrial fibrillation.  It was recommended that he have an echocardiogram, sleep study and Holter monitor at discharge.  He had another episode overnight where he awoke on the floor.  Further details are not clear to the patient.  He went to his primary care provider's office and is now being directly admitted.  Repeat CBC, BMP, UA and CT of the head are pending.  He will be admitted under observation status.  We will monitor on continuous telemetry.  Will repeat echocardiogram to assess relatively recent decline in ejection fraction.  Given recurrent falls with concern for syncope and declining ejection fraction as well as history of coronary artery disease and arrhythmias, we will consult Cardiology for further recommendations.  In the interim, he will be continued on his prior to admission metoprolol and losartan.   2.  Coronary artery disease with cardiomyopathy:  As above, had a recent decline in his ejection fraction this fall.  Not currently followed by Cardiology.  Repeat echocardiogram is pending.  He will be continued on his beta blocker and losartan.  He was started on a statin after his stroke in November; however, this was discontinued due to elevated LFTs.  We will defer to Cardiology if this should be resumed.   3.  History of atrial fibrillation:  Current EKG showing atrial flutter with a variable-degree block.  Not on anticoagulation in the setting of recurrent falls.  Continue prior to admission beta blocker.  Monitor on continuous telemetry.  Echocardiogram and Cardiology consult as above.   4.  Anxiety:  Continue prior to admission Zoloft.   5.  Deep  venous thrombosis prophylaxis:  PCDs.      CODE STATUS:  FULL CODE.      This patient was discussed with Dr. Paul of the Hospitalist Service who independently interviewed the patient.  He is in agreement with the above plan.         LJ PAUL MD       As dictated by CHOLO LOPEZ PA-C            D: 2018   T: 2018   MT: HOWARD      Name:     DEMI BAILEY   MRN:      [id number]        Account:      [id number]   :      10/06/1929        Admitted:     2018                   Document: [id number]

## 2018-06-11 NOTE — PROGRESS NOTES
SUBJECTIVE:   Bj Ponce is a 88 year old male who presents to clinic today for the following health issues:          Hospital Follow-up Visit:    Hospital/Nursing Home/IP Rehab Facility: [facility]  Date of Admission: 06/09/18  Date of Discharge: 06/10/18  Reason(s) for Admission: fall            Problems taking medications regularly:  None       Medication changes since discharge: None       Problems adhering to non-medication therapy:  None    Summary of hospitalization:  CareEverywhere information obtained and reviewed  See outside records, reviewed and scanned  Diagnostic Tests/Treatments reviewed.  Follow up needed: none  Other Healthcare Providers Involved in Patient s Care:         Specialist appointment - cards  Update since discharge: improved.     Post Discharge Medication Reconciliation: discharge medications reconciled, continue medications without change.  Plan of care communicated with patient and caregiver     Coding guidelines for this visit:  Type of Medical   Decision Making Face-to-Face Visit       within 7 Days of discharge Face-to-Face Visit        within 14 days of discharge   Moderate Complexity [id number]   High Complexity [id number]            The patient presents for hospital follow-up.  I reviewed the abdomen and discharge note from the hospital.  The patient presented there on on June 9 with a fall and possible syncope.  He fell forward off the toilet and hit his head on the day of assistance.  He is unsure as to why he fell and unsure if he lost consciousness.  He apparently was sitting on the toilet and the next thing he knew he was on the ground.  During the initial visit there it was noted that the patient would not off mid conversation on several instances.  During the initial evaluation at the hospital chest x-ray showed cardiomegaly and mild pulmonary vascular congestion with medial right base atelectasis versus infiltrate and mild left base atelectasis versus scarring  and a small right pleural effusion.  A head CT was done which showed no evidence of any acute abnormalities.  A CT of the cervical spine was done showing no evidence of fracture or dislocation with mild degenerative changes.  Lab results showed a hemoglobin of 10.8 with normal white count and platelet count.  Electrolytes were normal with a creatinine of 2.26.  Telemetry during the hospital stay showed controlled atrial fibrillation with no other arrhythmias.  During his hospital stay no events were noted.  During that stay they wondered if he could be having sleep apnea as the cause.    As noted, during my recent office visit with the patient on June 5, I noted multiple falls of unclear cause but not related to syncope.  At that time I stopped his Coumadin given the risk of recurrent falls.  I ordered balance therapy and changed his Demadex from 20-10 mg in the losartan from 100-50 mg.    The patient is here with his friend and confirms above.  The patient now tells me that last night he again ended up on the floor.  The patient had been feeling fine and went to bed and the next thing he knew in the middle of the night he was on the floor.  He remembers going to bed but then remembers waking up to a crash and being on the floor.  He states that this is similar to what happened when he was on the toilet.    Past Medical History:   Diagnosis Date     afib 2004, 2010, 2012    post op and then again 2010, 2012     Anxiety 01/2018    added zoloft     ASCVD (arteriosclerotic cardiovascular disease) 2004    angio for cp and 2 vessel dz, cabg done 2004, post op afib     Cardiomyopathy (H) 11/2017    found on echo done during cvi in [facility]; echo 1/18 here with ef 38%, global hypokinesis     CKD (chronic kidney disease) stage 4, GFR 15-29 ml/min (H)      CVA (cerebral vascular accident) (H) 11/25/2017    L MCA embolism, received TPA. No residual deficits, add coumadin     Genital herpes      HTN (hypertension)       Hypercholesteremia      Pseudogout      PVD (peripheral vascular disease) with claudication (H)      Past Surgical History:   Procedure Laterality Date     CATARACT IOL, RT/LT  2006     CORONARY ARTERY BYPASS  2004     HYDROCELECTOMY SCROTAL  1997     TONSILLECTOMY       TURP  1997     Social History     Social History     Marital status:      Spouse name: N/A     Number of children: 3     Years of education: N/A     Occupational History           Social History Main Topics     Smoking status: Former Smoker     Quit date: 4/16/1954     Smokeless tobacco: Never Used     Alcohol use No     Drug use: No     Sexual activity: Not Currently     Other Topics Concern     Not on file     Social History Narrative     Current Outpatient Prescriptions   Medication Sig Dispense Refill     aspirin EC 81 MG EC tablet Take 1 tablet (81 mg) by mouth daily 90 tablet 3     clotrimazole (LOTRIMIN) 1 % cream Apply topically 2 times daily Apply to feet and between toes       clotrimazole-betamethasone (LOTRISONE) cream Apply topically 2 times daily 15 g 1     losartan (COZAAR) 100 MG tablet Take 1 tablet (100 mg) by mouth daily 90 tablet 3     metoprolol tartrate (LOPRESSOR) 25 MG tablet Take 2 tablets (50 mg) by mouth 2 times daily 120 tablet 11     sertraline (ZOLOFT) 50 MG tablet Take 1 tablet (50 mg) by mouth daily 90 tablet 1     torsemide (DEMADEX) 20 MG tablet Take 1 tablet (20 mg) by mouth daily 90 tablet 3     warfarin (COUMADIN) 2.5 MG tablet TAKE 2-3 TABLETS DAILY (5-7.5 MG) BY MOUTH DAILY OR AS INSTRUCTED BY INR CLINIC 180 tablet 0     No Known Allergies  FAMILY HISTORY NOTED AND REVIEWED    REVIEW OF SYSTEMS: above    PHYSICAL EXAM    There were no vitals taken for this visit.    Patient appears non toxic, he did goes off once during our conversation.  Lungs clear  cv reglar rate and rhythm, no murmer, rub or gallop, no jvp or edema  Abdomen non-tender    Labs sent    ASSESSMENT:  1.  Probable syncope  although certainly this is not clear.  It is also possible that the patient is simply falling asleep and falling.  However, this does not explain the event last night where he was in bed and then the next thing he knows he found himself on the floor.  I am very concerned that he may be having syncopal episodes especially with his history of cardiomyopathy.  It is certainly very possible that this is sleep apnea related and that last night he simply fell out of bed but I believe he is unsafe right now to go home without a more clear diagnosis.  Certainly if he were to fall again he is at high risk of fracturing his hip or having a CNS bleed.  There was no arrhythmia found on the telemetry overnight in the hospital but this was a relatively short stay.  Therefore, I believe he merits repeat admission to the hospital for monitoring.  I am uncomfortable with sending him home with a Holter monitor which I could get not get right away anyways.  I discussed this with the patient and his friend saw the and they are both in agreement.  I have a call out to the hospitalist for a direct admit.  2. Cmyop, stable on med mgmt  3. ckd stable 4, follow up labs  4. Afib, no coum given falls    PLAN:  Awaiting call from hospitalist    Later, hospitalist agrees with admit.    Azar Zaldivar M.D.

## 2018-06-11 NOTE — IP AVS SNAPSHOT
` `           [place] [address] [facility]: [phone]                 INTERAGENCY TRANSFER FORM - NOTES (H&P, Discharge Summary, Consults, Procedures, Therapies)   2018                    Hospital Admission Date: 2018  BJ BAILEY   : 10/6/1929  Sex: Male        Patient PCP Information     Provider PCP Type    Azar Zaldivar MD General         History & Physicals      H&P signed by Kimberly Vang PA-C at 2018  7:04 PM  Also signed by Gerardo Paul MD at 2018  7:09 PM      Author:  Kimberly Vang PA-C Service:  Hospitalist Author Type:  Physician Assistant - C    Filed:  2018  7:04 PM Date of Service:  2018  5:43 PM Creation Time:  2018  6:24 PM    Status:  Attested :  Kimberly Vang PA-C (Physician Assistant - C)    Cosigner:  Gerardo Paul MD at 2018  7:09 PM        Attestation signed by Gerardo Paul MD at 2018  7:09 PM        Physician Attestation   IGerardo, have reviewed and discussed with the advanced practice provider their history, physical and plan for Bj Bailey. I did not participate in a shared visit by interviewing or examining the patient and this should be billed as an advanced practice provider only visit.    Gerardo Paul  Date of Service (when I saw the patient): I did not personally see this patient today.                               Admitted:     2018      CHIEF COMPLAINT:  Recurrent falls.  History obtained from patient's significant other, Mingo who was in the room, as well as extensive chart review.      HISTORY OF PRESENT ILLNESS:  Bj Bailey is an 88-year-old male with past medical history of coronary artery disease with a history of a 2-vessel bypass surgery in , atrial fibrillation, cardiomyopathy of unclear etiology and previous MCA CVA in 2017, who presented from his primary care provider's office for evaluation of recurrent falls.  The patient has had issues with  recurrent falls for the past month.  He has had several ED visits as well as an observation stay at [facility].  Historically, he is anticoagulated with Coumadin; however, his primary care provider discontinued the Coumadin approximately 10 days ago due to the recurrent falls.  He was actually just hospitalized at [facility] for 48 hours and discharged yesterday for a similar presentation.  The patient has had several Falls where he simply falls over.  Sometimes he is sitting in a chair or using the toilet.  Unfortunately, none of these falls have been witnessed.  It is really unclear if these may be related to syncope or are more mechanical in nature.      At his [facility] hospitalization, CT head, CT neck were obtained and were negative.  Laboratory evaluation revealed a stable hemoglobin with no leukocytosis.  Electrolytes were unremarkable.  He underwent a carotid ultrasound which revealed a 50% stenosis on the right and 50-69% stenosis on the left.  Telemetry revealed no significant arrhythmias.  It was recommended that the patient have an echocardiogram, a Holter monitor and a sleep study after discharge and he elected to follow up with his primary care provider.  Additionally, physical therapy was consulted and recommended discharge to TCU; however, given his stay was observation TCU would have been private pay, and he elected to discharge home with home care.      Last night apparently, he went to bed feeling at baseline and awoke on the floor after a crash.  He is really unclear how this happened.  Today, he presented to his primary care provider's office and given his recurrent falls and the fact that he lives alone, it was felt that patient be admitted for further evaluation.      Presently, the was evaluated in the hospital room with his significant other at bedside.  She notes a significant decline over the past month.  He has been much more sleepy.  Again, no one has witnessed these falls.   He had a stroke after Thanksgiving while visiting family out [facility].  This was a left MCA stroke.  He received TPA.  Additionally, during that hospitalization, he was found to have new cardiomyopathy with an ejection fraction of 35-40%.  On returning to [place], his primary care provider repeated his echocardiogram which did reveal an ejection fraction of 38% and global hypokinesis of the left ventricle; however, patient has not followed with Cardiology since 2011.  He denies any dizziness or lightheadedness.  No lower extremity swelling.  No shortness of breath.  No fevers or chills.  He has had diminished appetite for the past several weeks.      PAST MEDICAL HISTORY:   1.  Atrial fibrillation, not on anticoagulation as of recently due to falls.   2.  Coronary artery disease, status post 2-vessel bypass in 2004.   3.  Cardiomyopathy, etiology unclear.  Diagnosed in fall of 2017.  No further workup was obtained.  Ejection fraction noted to be 38%.   4.  Chronic kidney disease, stage 4, baseline creatinine 2-2.3.   5.  Anxiety.   6.  Dyslipidemia, not on a statin due to elevated LFTs.   7.  Pseudogout.   8.  Peripheral vascular disease.      PRIOR TO ADMISSION MEDICATIONS:  Prescriptions Prior to Admission   Medication Sig Dispense Refill Last Dose     aspirin EC 81 MG EC tablet Take 1 tablet (81 mg) by mouth daily 90 tablet 3 6/10/2018 at Unknown time     clotrimazole (LOTRIMIN) 1 % cream Apply topically 2 times daily Apply to feet and between toes   6/10/2018 at Unknown time     clotrimazole-betamethasone (LOTRISONE) cream Apply topically 2 times daily 15 g 1 6/10/2018 at Unknown time     LOSARTAN POTASSIUM PO Take 50 mg by mouth daily   6/10/2018 at Unknown time     sertraline (ZOLOFT) 50 MG tablet Take 1 tablet (50 mg) by mouth daily 90 tablet 1 6/10/2018 at Unknown time     TORSEMIDE PO Take 10 mg by mouth daily   6/10/2018 at Unknown time     metoprolol tartrate (LOPRESSOR) 25 MG tablet Take 2 tablets (50 mg)  by mouth 2 times daily 120 tablet 11 Taking        PAST SURGICAL HISTORY:   1.  Bypass surgery.   2.  Angiogram.   3.  TURP.   4.  Cataract.   5.  Tonsillectomy.      FAMILY HISTORY:  Sister had breast cancer.      SOCIAL HISTORY:  He is a former smoker, quit in the 1950s.  Does not drink alcohol.  Lives in an independent apartment.  He has a significant other of 30 years, but they do not live together.      REVIEW OF SYSTEMS:  A 10-point review of systems was completed.  Pertinent positives are in HPI, all other systems negative.      PHYSICAL EXAMINATION:   GENERAL:  Bj Ponce is a frail-appearing 80-year-old male who is lying in bed.   VITAL SIGNS:  Blood pressure is 142/70, pulse 69, temp 97.6.   HEAD:  Normocephalic and atraumatic.   CARDIOVASCULAR:  Irregular, but rate controlled.   PULMONARY:  Normal effort.  Lungs are clear.   ABDOMEN:  Soft, nontender.   EXTREMITIES:  Lower extremities without edema.   NEUROLOGIC:  Alert and oriented.  Cranial nerves II-XII are grossly intact.      LABORATORY DATA:  Stat BMP and CBC and INR are pending.  EKG shows atrial flutter with controlled ventricular rate.  Head CT is pending.      ASSESSMENT:  Bj Ponce is an 88-year-old male with past medical history of coronary artery disease, atrial fibrillation and 1 month of increasing falls versus syncope, who is admitted directly from clinic for evaluation of recurrent falls.   1.  Recurrent falls versus syncope:  Unfortunately, none of these falls have been witnessed.  The patient feels as though he possibly does have a loss of consciousness.  He was recently hospitalized at [facility] on observation, just discharged yesterday.  Laboratory evaluation at that time was unremarkable.  Telemetry showed atrial fibrillation.  It was recommended that he have an echocardiogram, sleep study and Holter monitor at discharge.  He had another episode overnight where he awoke on the floor.  Further details are not clear to the  patient.  He went to his primary care provider's office and is now being directly admitted.  Repeat CBC, BMP, UA and CT of the head are pending.  He will be admitted under observation status.  We will monitor on continuous telemetry.  Will repeat echocardiogram to assess relatively recent decline in ejection fraction.  Given recurrent falls with concern for syncope and declining ejection fraction as well as history of coronary artery disease and arrhythmias, we will consult Cardiology for further recommendations.  In the interim, he will be continued on his prior to admission metoprolol and losartan.   2.  Coronary artery disease with cardiomyopathy:  As above, had a recent decline in his ejection fraction this fall.  Not currently followed by Cardiology.  Repeat echocardiogram is pending.  He will be continued on his beta blocker and losartan.  He was started on a statin after his stroke in November; however, this was discontinued due to elevated LFTs.  We will defer to Cardiology if this should be resumed.   3.  History of atrial fibrillation:  Current EKG showing atrial flutter with a variable-degree block.  Not on anticoagulation in the setting of recurrent falls.  Continue prior to admission beta blocker.  Monitor on continuous telemetry.  Echocardiogram and Cardiology consult as above.   4.  Anxiety:  Continue prior to admission Zoloft.   5.  Deep venous thrombosis prophylaxis:  PCDs.      CODE STATUS:  FULL CODE.      This patient was discussed with Dr. Paul of the Hospitalist Service who independently interviewed the patient.  He is in agreement with the above plan.         LJ PAUL MD       As dictated by CHOLO LOPEZ PA-C            D: 2018   T: 2018   MT: HOWARD      Name:     DEMI BAILEY   MRN:      [id number]        Account:      [id number]   :      10/06/1929        Admitted:     2018                   Document: [id number][KG1.1]         Revision History        User Key  "Date/Time User Provider Type Action    > KG1.1 6/11/2018  7:04 PM Kimberly Vang PA-C Physician Assistant - PIP[facility] Sign     [N/A] 6/11/2018  6:24 PM Kimberly Vang PA-C Physician Assistant - [facility] Edit                     Discharge Summaries      Discharge Summaries by Pa Upton MD at 6/18/2018 11:40 AM     Author:  Pa Upton MD Service:  Hospitalist Author Type:  Physician    Filed:  6/18/2018 12:03 PM Date of Service:  6/18/2018 11:40 AM Creation Time:  6/18/2018 11:40 AM    Status:  Signed :  Pa Upton MD (Physician)         [facility]    Discharge Summary  Hospitalist    Date of Admission:  6/11/2018  Date of Discharge:  6/18/2018  Discharging Provider:[SF1.1] Pa Upton[SF1.2], MD[SF1.1]    Discharge Diagnoses[SF1.2]   Recurrent falls  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 Patient syncopizing/falling asleep while speaking to providers frequently this admission, usually lasting a few seconds, and always able to be awakened within seconds by voice or touch. On 6/16 patient fell in the bathroom, XRs and head CT showed no sequelae. Evaluated by both neurology and cardiology without any clear neurological or cardiac related cause to his \"sleeping\" spells. Oximetry study 6/16-6/17 any significant overnight hypoxia. Discussed with sleep study physician Dr. No--patient has known old paramedian pontine christy which is associated with narcolepsy. Plan is to discharge patient to TCU with close outpatient sleep clinic follow up.    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 On admission, Neurology and cardiology both consulted, no clear neurologic or cardiac etiology. Had abnormal stress test however given his frequent falls, however given advanced chronic kidney disease, cardiology recommending medical management at this point. During this admission, patient was noted to have frequent (sometimes once a minute) dozing/sleeping spells, which patient himself was not fully aware of.   EEG on 6/15 was notable for \"intermittent generalized slowing which is consistent with a mild encephalopathy.\" However, when patient was evaluated by Neuropsych on 6/15, he tested 30/30 on the mini mental status exam.   MRI this admission notes old paramedian pontine stroke. Patient was hospitalized for MCA stroke in 11/2017 and the same old pontine lesion was noted at that time. Patient denies any known previous history of strokes. The case was discussed with Dr. No, who notes that certain pontine strokes can cause sleep disorders and/or wakefulness issues. There is a known association with pontine strokes and narcolepsy.    - Follow up with sleep clinic while at rehabilitation--full rehabilitation will likely require some improvement in patient's constant sleeping/dozing/syncopal episodes      Chronic afib/flutter  CAD, ischemic cardiomyopathy  High CHADS-VASC score however not anticoagulation candidate secondary to frequent falls. EP following him and planning to follow-up in the clinic to discuss possible watchman procedure. Echo 6/12 with EF 30-40% with WMA. Currently A. fib with controlled ventricular rate. Noted abnormal stress test and given his chronic kidney disease frequent falls and other medical comorbidities cardiology recommending medical management at this point.    - Continue torsemide, " Lopressor and losartan, aspirin .  Statin discontinued for his elevated LFTs..      [SF1.1]Hospital Course[SF1.2]   Bj Ponce was admitted on 6/11/2018.  The following problems were addressed during his hospitalization:[SF1.1]    Active Problems:    Fall    Falls[SF1.2]      Pa Upton MD[SF1.1]    Significant Results and Procedures[SF1.2]   Echo 6/12  Lexiscan 6/13  MR brain 6/14  EEG 6/15  Nighttime oximetry 6/17[SF1.1]    Pending Results[SF1.2]   None[SF1.1]    Code Status[SF1.2]   Full Code[SF1.1]       Primary Care Physician   Azar Zaldivar[SF1.2]    # Discharge Pain Plan:   - Patient currently has NO PAIN and is not being prescribed pain medications on discharge.[SF1.1]      Physical Exam   Temp: 97.2  F (36.2  C) Temp src: Oral BP: 147/76 Pulse: 64 Heart Rate: 63 Resp: 16 SpO2: 93 % O2 Device: None (Room air)    Vitals:    06/11/18 1700 06/13/18 0600 06/18/18 0300   Weight: 56.2 kg (123 lb 12.8 oz) 56.6 kg (124 lb 12.5 oz) 55.2 kg (121 lb 9.6 oz)[SF1.2]     Vital Signs with Ranges[SF1.1]  Temp:  [94.1  F (34.5  C)-97.2  F (36.2  C)] 97.2  F (36.2  C)  Pulse:  [61-64] 64  Heart Rate:  [63] 63  Resp:  [16] 16  BP: (125-147)/(69-76) 147/76  SpO2:  [93 %-97 %] 93 %  I/O last 3 completed shifts:  In: 880 [P.O.:880]  Out: 550 [Urine:550][SF1.2]    Constitutional: Male in NAD  HEENT: Eyes nonicteric, oral mucosa moist  Cardiovascular: RRR, normal S1/2, no m/r/g  Respiratory: CTAB, no wheezing or crackles  Vascular: 1-2+ LE pitting edema, noted distal pedal pulses  GI: Normoactive bowel sounds, nontender, nondistended  Skin/Integumen: Notable bruising in UE and LE, including skin tears over R elbow and wrist where patient fell on 6/16  Neuro/Psych: Appropriate affect and mood. A&Ox3, moves all extremities. Falls asleep when speaking and listening mid-sentence but can be woken up with voice each time.[SF1.1]    Discharge Disposition[SF1.2]   Discharged to short-term care facility  Condition at discharge:  Stable[SF1.1]    Consultations This Hospital Stay   SOCIAL WORK IP CONSULT  PHYSICAL THERAPY ADULT IP CONSULT  CARDIOLOGY IP CONSULT  WOUND OSTOMY CONTINENCE NURSE  IP CONSULT  ELECTROPHYSIOLOGY IP CONSULT  NEUROPSYCHOLOGY IP CONSULT  NEUROLOGY IP CONSULT  PHYSICAL THERAPY ADULT IP CONSULT  OCCUPATIONAL THERAPY ADULT IP CONSULT  PHYSICAL THERAPY ADULT IP CONSULT  OCCUPATIONAL THERAPY ADULT IP CONSULT    Time Spent on this Encounter[SF1.2]   Pa CHAMPAGNE, personally saw the patient today and spent greater than 30 minutes discharging this patient.[SF1.1]    Discharge Orders     Follow-Up with Cardiac Advanced Practice Provider     Cardiac event monitor     General info for SNF   Length of Stay Estimate: Short Term Care: Estimated # of Days <30  Condition at Discharge: Improving  Level of care:skilled   Rehabilitation Potential: Good  Admission H&P remains valid and up-to-date: Yes  Recent Chemotherapy: N/A  Use Nursing Home Standing Orders: Yes     Mantoux instructions   Give two-step Mantoux (PPD) Per Facility Policy Yes     Reason for your hospital stay   You were hospitalized for falls, and falling asleep recurrently.     Activity - Up with nursing assistance     Additional Discharge Instructions   Patient is a fall risk given his sleeping spells, and should not be left unattended when standing, walking, or in the bathroom.     Full Code     Physical Therapy Adult Consult   Evaluate and treat as clinically indicated.    Reason:  Deconditioning     Occupational Therapy Adult Consult   Evaluate and treat as clinically indicated.    Reason:  Deconditioning     Fall precautions     Advance Diet as Tolerated   Follow this diet upon discharge: Orders Placed This Encounter     Room Service     Regular Diet Adult       Discharge Medications   Current Discharge Medication List      START taking these medications    Details   melatonin 1 MG TABS tablet Take 1 tablet (1 mg) by mouth At Bedtime    Associated  Diagnoses: Syncope, unspecified syncope type      multivitamin, therapeutic with minerals (THERA-VIT-M) TABS tablet Take 1 tablet by mouth daily  Qty: 30 each, Refills: 0    Associated Diagnoses: Syncope, unspecified syncope type         CONTINUE these medications which have NOT CHANGED    Details   aspirin EC 81 MG EC tablet Take 1 tablet (81 mg) by mouth daily  Qty: 90 tablet, Refills: 3    Associated Diagnoses: Cerebrovascular accident (CVA), unspecified mechanism (H)      clotrimazole (LOTRIMIN) 1 % cream Apply topically 2 times daily Apply to feet and between toes    Associated Diagnoses: Cerebrovascular accident (CVA), unspecified mechanism (H)      clotrimazole-betamethasone (LOTRISONE) cream Apply topically 2 times daily  Qty: 15 g, Refills: 1      LOSARTAN POTASSIUM PO Take 50 mg by mouth daily      sertraline (ZOLOFT) 50 MG tablet Take 1 tablet (50 mg) by mouth daily  Qty: 90 tablet, Refills: 1    Associated Diagnoses: Anxiety      TORSEMIDE PO Take 10 mg by mouth daily      metoprolol tartrate (LOPRESSOR) 25 MG tablet Take 2 tablets (50 mg) by mouth 2 times daily  Qty: 120 tablet, Refills: 11    Associated Diagnoses: Cerebrovascular accident (CVA), unspecified mechanism (H)           Allergies   No Known Allergies  Data[SF1.2]   Most Recent 3 CBC's:[SF1.1]  Recent Labs   Lab Test  06/17/18   0830  06/14/18   1218  06/11/18   1745   WBC  5.5  6.3  5.7   HGB  11.1*  10.9*  10.7*   MCV  93  95  94   PLT  229  235  219[SF1.2]      Most Recent 3 BMP's:[SF1.1]  Recent Labs   Lab Test  06/17/18   0830  06/14/18   0915  06/11/18   1745   NA  140  141  142   POTASSIUM  4.4  4.9  4.3   CHLORIDE  108  108  107   CO2  22  26  26   BUN  55*  47*  48*   CR  2.23*  2.11*  2.12*   ANIONGAP  10  7  9   GARETT  9.1  9.4  9.3   GLC  78  92  94[SF1.2]     Most Recent 2 LFT's:[SF1.1]  Recent Labs   Lab Test  01/25/18   1449 12/12/17 12/06/16   0936   AST  43  34  34   ALT  25  40  38   ALKPHOS  227*   --   182*   BILITOTAL  0.7    --   0.6[SF1.2]     Most Recent INR's and Anticoagulation Dosing History:  Anticoagulation Dose History     Recent Dosing and Labs Latest Ref Rng & Units 4/10/2018 4/13/2018 4/24/2018 5/8/2018 5/18/2018 6/3/2018 6/11/2018    Warfarin 7.5 mg - - - - - - 7.5 mg -    INR 0.86 - 1.14 5.2 - - - - 1.49(H) 1.20(H)    INR 0.86 - 1.14 - 2.2(A) 3.7(A) 2.5(A) 2.8(A) - -        Most Recent 3 Troponin's:[SF1.1]No lab results found.[SF1.2]  Most Recent Cholesterol Panel:[SF1.1]  Recent Labs   Lab Test  12/06/16   0936   CHOL  151   LDL  78   HDL  50   TRIG  115[SF1.2]     Most Recent 6 Bacteria Isolates From Any Culture (See EPIC Reports for Culture Details):[SF1.1]  Recent Labs   Lab Test  06/11/18   1925   CULT  <10,000 colonies/mL  urogenital lillian    Susceptibility testing not routinely done[SF1.2]     Most Recent TSH, T4 and A1c Labs:[SF1.1]  Recent Labs   Lab Test  06/11/18   1745   TSH  2.17[SF1.2]     Results for orders placed or performed during the hospital encounter of 06/11/18   CT Head w/o Contrast    Narrative    CT OF THE HEAD WITHOUT CONTRAST 6/11/2018 8:02 PM     COMPARISON: Head CT 6/3/2018    HISTORY: Fall.    TECHNIQUE: 5 mm thick axial CT images of the head were acquired  without IV contrast material.    FINDINGS: There is moderate diffuse cerebral volume loss. There are  subtle patchy areas of decreased density in the cerebral white matter  bilaterally that are consistent with sequela of chronic small vessel  ischemic disease.    The ventricles and basal cisterns are within normal limits in  configuration given the degree of cerebral volume loss.  There is no  midline shift. There are no extra-axial fluid collections.    No intracranial hemorrhage, mass or recent infarct.    The visualized paranasal sinuses are well-aerated. There is no  mastoiditis. There are no fractures of the visualized bones.      Impression    IMPRESSION: Diffuse cerebral volume loss and cerebral white matter  changes consistent  with chronic small vessel ischemic disease. No  evidence for acute intracranial pathology.      Radiation dose for this scan was reduced using automated exposure  control, adjustment of the mA and/or kV according to patient size, or  iterative reconstruction technique    MICAH BISHOP MD   US Lower Extremity Venous Duplex Bilateral    Narrative    US LOWER EXTREMITY VENOUS DUPLEX BILATERAL6/12/2018 11:29 AM    HISTORY: Rule out DVT, assymetrical right leg swelling compared to  left;     TECHNIQUE:  Venous Doppler US.? Color flow and spectral Doppler with  waveform analysis performed.      Impression    IMPRESSION:  No evidence of lower extremity deep venous thrombosis.    HARSHIL OWUSU MD   NM MPI w Lexiscan    Narrative    GATED MYOCARDIAL PERFUSION SCINTIGRAPHY WITH INTRAVENOUS PHARMACOLOGIC  VASODILATATION LEXISCAN -ONE DAY STUDY     6/13/2018 2:54 PM  DEMI BAILEY  88 years  Male  10/6/1929.    Indication/Clinical History: Ischemic cardiomyopathy status post  coronary bypass surgery with atrial fibrillation    Impression  1.  Myocardial perfusion imaging using single isotope technique  demonstrated small to moderate size partly reversible  inferior/inferolateral defect extending from the base to the apex  consistent with small nontransmural infarct with mild-to-moderate  monica-infarct ischemia extending to the apex. Specificity maybe  affected by significant diaphragm attenuation/bowel uptake artifact.  No other significant ischemia or infarct noted..   2. Gated images demonstrated moderately dilated left ventricle with  severe decrease in overall contractility with severe global  hypokinesis and akinesis of the inferolateral wall at the base..  The  left ventricular systolic function is severely reduced with ejection  fraction of 22%.  3. Compared to the prior study from 3/17/2005 there is mild to  moderate ischemia in the area of the previously described  inferolateral scar .    Procedure  Pharmacologic stress  testing was performed with Lexiscan at a rate of  0.08 mg/ml rapid bolus injection, for 15 seconds, 0.4 mg/5ml  intravenously. Low-level exercise was not performed along with the  vasodilator infusion.  The heart rate was 67 at baseline and estiven to  65 beats per minute during the Lexiscan infusion. The rest blood  pressure was 152/70 mmHg and was 134/76 mm Hg during Lexiscan  infusion. The patient experienced shortness of breath  during the  test.    Myocardial perfusion imaging was performed at rest, approximately 45  minutes after the injection intravenously of 8.0 mCi of Tc-99m  Myoview. At peak pharmacologic effect, 10-20 seconds after Lexiscan,   the patient was injected intravenously with 26.0 mCi of  Tc-99m  Myoview. The post-stress tomographic imaging was performed  approximately 60 minutes after stress.    EKG Findings  The resting EKG demonstrated atrial fibrillation with evidence of old  inferolateral infarct with isolated ventricular ectopy and nonspecific  ST-T wave changes. The stress EKG demonstrated atrial fibrillation  with old inferolateral infarct and no significant ST depression.    Tomographic Findings  Overall, the study quality is fair with significant diaphragm  attenuation/bowel uptake artifact . On the stress images, there is a  some small to moderate size mild inferior/inferolateral defect  extending from the base towards the apex. On the rest images, there is  a small mild inferolateral defect at the base with mild decrease in  apical activity . Gated images demonstrated moderately dilated left  ventricle with severe decrease in overall contractility with severe  global hypokinesis and akinesis of the inferolateral wall at the base.  The left ventricular ejection fraction was calculated to be 22% with  stress 34% at rest. TID was present.    KAYLA LION MD   MR Brain w/o Contrast    Narrative    MRI BRAIN WITHOUT CONTRAST  6/14/2018 11:06 PM    HISTORY:  Syncopal spells.     TECHNIQUE:   Multiplanar, multisequence MRI of the brain without  gadolinium IV contrast material.    COMPARISON:  CT scan 6/11/2018.    FINDINGS:  There is generalized atrophy of the brain. White matter  changes are seen in the cerebral hemispheres consistent with sequelae  of small vessel ischemic disease. Focal paramagnetic effect is seen in  the right paramedian christy consistent with an old hemorrhage. There is  no evidence of acute hemorrhage, infarct or mass. Mild  encephalomalacia and gliosis is seen in the occipital lobes  bilaterally.    There is an intraocular implant in the left globe. Otherwise facial  structures appear normal. The arteries at the base of the brain and  the dural venous sinuses appear patent.      Impression    IMPRESSION:    1. No acute abnormality.  2. Brain atrophy and white matter changes consistent with sequelae of  small vessel ischemic disease.  3. Old hemorrhage in the right paramedian christy.  4. Encephalomalacia and gliosis in the occipital lobes.    DRU ALEXANDER MD   CT Head w/o Contrast    Narrative    CT OF THE HEAD WITHOUT CONTRAST 6/16/2018 9:42 AM     COMPARISON: Brain MR 6/14/2018.    HISTORY: Fall, hit head.    TECHNIQUE: 5 mm thick axial CT images of the head were acquired  without IV contrast material.    FINDINGS:  There is moderate diffuse cerebral volume loss. There are  subtle patchy areas of decreased density in the cerebral white matter  bilaterally that are consistent with sequela of chronic small vessel  ischemic disease.     The ventricles and basal cisterns are within normal limits in  configuration given the degree of cerebral volume loss.  There is no  midline shift. There are no extra-axial fluid collections.     No intracranial hemorrhage, mass or recent infarct.    The visualized paranasal sinuses are well-aerated. There is no  mastoiditis. There are no fractures of the visualized bones.      Impression    IMPRESSION:  Diffuse cerebral volume loss and cerebral white  matter  changes consistent with chronic small vessel ischemic disease. No  evidence for acute intracranial pathology.       Radiation dose for this scan was reduced using automated exposure  control, adjustment of the mA and/or kV according to patient size, or  iterative reconstruction technique.    MICAH BISHOP MD   XR Pelvis w Hip Right 1 View    Narrative    PELVIS AND HIP RIGHT ONE VIEW  6/16/2018 10:00 AM     COMPARISON: None.    HISTORY: Fall, right hip pain.     FINDINGS: The visualized bones and joint spaces are within normal  limits.      Impression    IMPRESSION: No evidence for fracture, dislocation or significant  degenerative change of the pelvis or right hip.    MICAH BISHOP MD   XR Elbow Right G/E 3 Views    Narrative    RIGHT ELBOW THREE OR MORE VIEWS  6/16/2018 10:01 AM     COMPARISON: None.    HISTORY: Fall, hit right elbow.      Impression    IMPRESSION: There is osteophytic spurring of the radial head. No  fractures noted. No significant right elbow joint effusion.    MICAH BISHOP MD[SF1.1]          Revision History        User Key Date/Time User Provider Type Action    > SF1.2 6/18/2018 12:03 PM Pa Upton MD Physician Sign     SF1.1 6/18/2018 11:40 AM Pa Upton MD Physician                      Consult Notes      Consults by Iona Ghotra APRN CNP at 6/14/2018  2:06 PM     Author:  Iona Ghotra APRN CNP Service:  Neurology Author Type:  Nurse Practitioner    Filed:  6/14/2018  3:50 PM Date of Service:  6/14/2018  2:06 PM Creation Time:  6/14/2018  1:41 PM    Status:  Signed :  Iona Ghotra APRN CNP (Nurse Practitioner)     Consult Orders:    1. Neurology IP Consult: General (non-stroke/non-ICU); Patient to be seen: Routine - within 24 hours; frequent narcolepsy; Consultant may enter orders: Yes [[id number]] ordered by Shayy Armas MD at 06/14/18 1052                  [facility]    Neurology Consultation Note     Bj  CARLY Ponce MRN# [id number]   YOB: 1929 Age: 88 year old    Code Status:[MC1.1]Full Code[MC1.2]   Date of Admission: 6/11/2018  Date of Consult:[MC1.1] 06/14/2018[MC1.2]    _________________________________[MC1.1]   Primary Care Physician   Azar Zaldivar[MC1.2]      ______________________________________________[MC1.1]         Assessment & Plan[MC1.2]     Reason for consult: I was asked by Dr. Armas to evaluate this patient for syncopal/narcoleptic episodes        ______________________________________________  #.[MC1.1] (R55) Syncope, unspecified syncope type[MC1.3]  --CT head negative for acute abnormality  --appears to fall asleep while conversing[MC1.1] - he is not always aware[MC1.4]  --will get MRI brain & EEG for further evaluation  #.[MC1.1] (R29.6) Recurrent falls  (primary encounter diagnosis)[MC1.3]  --management per primary service  #.[MC1.1] (I48.2) Permanent atrial fibrillation (H)[MC1.3]  --management per hospitalist & cardiology  #. DVT Prophylaxis  --per primary service  #. PT/OT/Speech  --continue evaluations  #. Nutrition / GI Prophylaxis  --Per recommendations of speech therapy      #. Code Status: Full Code[MC1.1]      Chief Complaint[MC1.2]   ______________________________________________  Recurrent syncopal spells   History is obtained from the[MC1.1] patient and electronic health record[MC1.4]    History of Present Illness[MC1.2]   ______________________________________________  Bj Ponce is a 88 year old male who presented on 6/11/18 with recurrent falls in the past month. He had several ED visits, as well as an observation stay at [facility]. His PCP stopped his chronic coumadin approximately 10 days prior to admission due to the recurrent falls. He was hospitalized at [facility] for 48 hours and discharged the day prior to this admission, for similar presentation. The patient has had several falls where he just falls over, including sitting in a chair or on the  toilet. None of the falls have been witnessed. Reportedly had a stroke while out of town for Thanksgiving visiting family - left MCA stroke. He received tPA. Initially this admission, cardiology was consulted for further evaluation. Their workup is essentially completed and they recommend an event monitor at discharge. Hospitalist noted today that while interviewing the patient, he seems to fall asleep mid-sentence. Neurology was consulted for further evaluation.     On exam,[MC1.1] patient is oriented, answers questions appropriately, able to give history of his medical condition. He states he gets no warning before he just suddenly feels tired, leans his head over and his legs give out. He states his endurance is not what it used to be. He reports he moved to a new place in February, and since just before his move he has had trouble sleeping at night and is always tired during the day. He doesn't understand why he can't just sleep. EEG was just completed - he did not like the test. Discussed the possibility of needing a sleep study, but he would be hooked to EEG all night, and he is not interested in this. No focal neurological findings on exam, but he did have occasional periods of falling asleep in mid-conversation.[MC1.4]     Past Medical History[MC1.2]    ______________________________________________[MC1.1]  Past Medical History:   Diagnosis Date     afib 2004, 2010, 2012    post op and then again 2010, 2012     Anxiety 01/2018    added zoloft     ASCVD (arteriosclerotic cardiovascular disease) 2004    angio for cp and 2 vessel dz, cabg done 2004, post op afib     Cardiomyopathy (H) 11/2017    found on echo done during cvi in [facility]; echo 1/18 here with ef 38%, global hypokinesis     CKD (chronic kidney disease) stage 4, GFR 15-29 ml/min (H)      CVA (cerebral vascular accident) (H) 11/25/2017    L MCA embolism, received TPA. No residual deficits, add coumadin     Genital herpes      HTN (hypertension)       Hypercholesteremia      Pseudogout      PVD (peripheral vascular disease) with claudication (H)      Past Surgical History[MC1.2]   ______________________________________________[MC1.1]  Past Surgical History:   Procedure Laterality Date     CATARACT IOL, RT/LT  2006     CORONARY ARTERY BYPASS  2004     HYDROCELECTOMY SCROTAL  1997     TONSILLECTOMY       TURP  1997     Prior to Admission Medications[MC1.2]   ______________________________________________[MC1.1]  Prior to Admission Medications   Prescriptions Last Dose Informant Patient Reported? Taking?   LOSARTAN POTASSIUM PO 6/10/2018 at Unknown time Spouse/Significant Other Yes Yes   Sig: Take 50 mg by mouth daily   TORSEMIDE PO 6/10/2018 at Unknown time Spouse/Significant Other Yes Yes   Sig: Take 10 mg by mouth daily   aspirin EC 81 MG EC tablet 6/10/2018 at Unknown time Spouse/Significant Other No Yes   Sig: Take 1 tablet (81 mg) by mouth daily   clotrimazole (LOTRIMIN) 1 % cream 6/10/2018 at Unknown time Spouse/Significant Other Yes Yes   Sig: Apply topically 2 times daily Apply to feet and between toes   clotrimazole-betamethasone (LOTRISONE) cream 6/10/2018 at Unknown time Spouse/Significant Other No Yes   Sig: Apply topically 2 times daily   metoprolol tartrate (LOPRESSOR) 25 MG tablet  Spouse/Significant Other No No   Sig: Take 2 tablets (50 mg) by mouth 2 times daily   sertraline (ZOLOFT) 50 MG tablet 6/10/2018 at Unknown time Spouse/Significant Other No Yes   Sig: Take 1 tablet (50 mg) by mouth daily      Facility-Administered Medications: None     Allergies   No Known Allergies    Social History[MC1.2]   ______________________________________________[MC1.1]  Social History     Social History     Marital status:      Spouse name: N/A     Number of children: 3     Years of education: N/A     Occupational History           Social History Main Topics     Smoking status: Former Smoker     Quit date: 4/16/1954     Smokeless tobacco:  Never Used     Alcohol use No     Drug use: No     Sexual activity: Not Currently     Other Topics Concern     Not on file     Social History Narrative       Family History[1.2]   ______________________________________________[MC1.1]  Family History   Problem Relation Age of Onset     Breast Cancer Sister        Review of Systems[1.2]   ______________________________________________  A comprehensive review of  10 systems was performed and found to be negative except as described in this note  CONSTITUTIONAL: negative for fever, chills, change in weight  INTEGUMENTARY/SKIN: no rash or obvious new lesions  ENT/MOUTH: no sore throat, new sinus pain or nasal drainage, no neck mass noted  RESP: No pleuretic pain, No cough, no hemoptysis, No SOB   CV: negative for chest pain, palpitations or peripheral edema  GI: no nausea, vomiting, change in stools  : no dysuria or hematuria  MUSCULOSKELETAL: no myalgias, arthralgias or join efffusion  ENDOCRINE: no history of polyuria, polydyspsia or symptoms of thyroid dysfunction  PSYCHIATRIC: no change in mood stable  LYMPHATIC: no new lymphadenopathy  HEME: no bleeding or easy bruisability  NEURO: see HPI[MC1.1]    Physical Exam[1.2]   ______________________________________________  Weight:[MC1.1]124 lbs 12.49 oz[1.2]; Height:[1.1]Data Unavailable  Temp: 97.3  F (36.3  C) Temp src: Axillary BP: 147/86 Pulse: 65 Heart Rate: 70 Resp: 18 SpO2: 98 % O2 Device: None (Room air)[MC1.2]    General Appearance:  No acute distress[MC1.1]  Neuro:       Mental Status Exam:   Awake, alert, oriented X3. Periodically falls asleep during conversation. Speech and language are intact. Mental status is normal       Cranial Nerves:  Pupils 3 mm, reactive. EOMI. Face sensation is normal. Face is symmetric. Tongue and uvula are midline. Other CN are normal           Motor:  5/5 X 4. Tone and bulk are normal           Reflexes:  Normal DTR. Toes downgoing.        Sensory:  Normal to light  touch               Coordination:   Intact finger-to-nose        Gait:  Up with assistance[MC1.4]  Neck: no nuchal rigidity, normal thyroid. No carotid bruits.    Cardiovascular: Regular rate and rhythm, no m/r/g  Lungs: Clear to auscultation  Abdomen: Soft, not tender, not distended  Extremities: No clubbing, no cyanosis, no edema[MC1.1]    Data[MC1.2]   ______________________________________________  All Data personally reviewed:       Labs:   CBC RESULTS:[MC1.1]       Recent Labs  Lab 06/14/18  1218 06/11/18  1745   WBC 6.3 5.7   RBC 3.55* 3.50*   HGB 10.9* 10.7*   HCT 33.8* 32.8*    219[MC1.2]     Basic Metabolic Panel:[MC1.1]   Recent Labs   Lab Test  06/14/18   0915  06/11/18   1745  06/03/18   1230   NA  141  142  142   POTASSIUM  4.9  4.3  4.6   CHLORIDE  108  107  109   CO2  26  26  28   BUN  47*  48*  49*   CR  2.11*  2.12*  1.95*   GLC  92  94  96   GARETT  9.4  9.3  9.6[MC1.2]     Liver panel:[MC1.1]  Recent Labs   Lab Test  01/25/18   1449 12/12/17 12/06/16   0936   PROTTOTAL  6.9   --   7.5   ALBUMIN  2.4*   --   3.0*   BILITOTAL  0.7   --   0.6   ALKPHOS  227*   --   182*   AST  43  34  34   ALT  25  40  38[MC1.2]     INR:[MC1.1]  Recent Labs   Lab Test  06/11/18   1745  06/03/18   1230 05/18/18 05/08/18 04/24/18   INR  1.20*  1.49*  2.8*  2.5*  3.7*[MC1.2]      Lipid Profile:[MC1.1]  Recent Labs   Lab Test  12/06/16   0936  10/05/15   0858  08/05/14   0819  05/24/13   0953  04/25/12   0808   CHOL  151  161  142  145  145   HDL  50  54  53  47  42   LDL  78  82  64  78  82   TRIG  115  123  124  116  106   CHOLHDLRATIO   --   3.0  2.7  3.2  3.5[MC1.2]     UA Results:[MC1.1]  Recent Labs   Lab Test  06/11/18 1925   COLOR  Yellow   APPEARANCE  Clear   URINEGLC  Negative   URINEBILI  Negative   URINEKETONE  Negative   SG  1.011   UBLD  Negative   URINEPH  6.0   PROTEIN  100*   NITRITE  Negative   LEUKEST  Small*   RBCU  <1   WBCU  12*[MC1.2]     Most Recent 6 Bacteria Isolates From Any Culture  (See EPIC Reports for Culture Details):[MC1.1]  Recent Labs   Lab Test  06/11/18   1925   CULT  <10,000 colonies/mL  urogenital lillian    Susceptibility testing not routinely done[MC1.2]        Cardiac US:   Left ventricular systolic function is moderately reduced. The visual ejection fraction is estimated at 30-40%. There is moderate global hypokinesia of the left ventricle. There is inferolateral wall akinesis.  There is moderate biatrial enlargement. Immobile mitral valve posterior leaflet is responsible for the jet of moderate to mod-severe (2-3+ or 3+) mitral regurgitation. There is at least moderate (2+) tricuspid regurgitation. Right ventricular systolic pressure is elevated, consistent with moderate to severe pulmonary hypertension. The ascending aorta is mildly dilated. There is mild to moderate (1-2+) aortic  Regurgitation. The rhythm was atrial fibrillation. Compared to the prior echo, there has been a marked increase in mitral and tricuspid insufficiency.       Neurophysiology:   --       Imaging:   All imaging studies were reviewed personally  CT head 6/11/18:   Diffuse cerebral volume loss and cerebral white matter changes consistent with chronic small vessel ischemic disease. No evidence for acute intracranial pathology.        Text Page    Iona Ghotra, MSN, FNP-BC, RN CNRN[MC1.1]       Revision History        User Key Date/Time User Provider Type Action    > MC1.4 6/14/2018  3:50 PM Iona Ghotra APRN CNP Nurse Practitioner Sign     MC1.3 6/14/2018  2:03 PM Iona Ghotra APRN CNP Nurse Practitioner      MC1.2 6/14/2018  1:44 PM Iona Ghotra APRN CNP Nurse Practitioner      MC1.1 6/14/2018  1:41 PM Iona Ghotra APRN CNP Nurse Practitioner             Consults signed by eBnjamin Lieberman MD at 6/14/2018  9:57 AM      Author:  Benjamin Lieberman MD Service:  Cardiology Author Type:  Physician    Filed:  6/14/2018  9:57 AM Date of Service:  6/13/2018 11:16 AM  Creation Time:  6/13/2018 11:57 AM    Status:  Signed :  Benjamin Lieberman MD (Physician)         Consult Date:  06/13/2018      REASON FOR CONSULTATION:  Evaluation of frequent falls and cardiomyopathy.      HISTORY OF PRESENT ILLNESS:  Mr. Ponce is a delightful 88-year-old gentleman with a history of hypertension, hyperlipidemia, chronic kidney disease (creatinine around 2) and coronary disease (CABG 2004), permanent atrial fibrillation and a recent stroke in November 2017, who presents for frequent episodes of fall.  EP was consulted to help with management.      The patient is in permanent atrial fibrillation.  Apparently the last couple of months, he started having frequent episodes of fall resulting in limbs and head trauma.  Anticoagulation was then discontinued due to the concern for the frequent episodes of fall.      He was admitted 06/11 due to the frequent episodes of fall and failure to thrive.  During admission, head and neck CT were unremarkable.  He was evaluated by Cardiology and a nuclear stress test was scheduled for today.  Echocardiogram revealed an EF around 35-40%.     Of note, the patient is a poor historian.  It is difficult to tease out whether or not he actually has dizziness prior to these episodes of fall.  Talking to the nursing staff,  they witnessed several episodes here.  He seems to trip and fall.  He also hs problems with insomnia and becomes very somnolent during the day.  He falls asleep easy while sitting on the chair and sometimes he falls or hits his head.  Telemetry shows Afib with good heart rate control and no significant bradycardia or tachycardia was seen.      Regarding LV dysfunction.  In November 2017, he was found to have LV dysfunction (EF around 35-40%).  Echocardiogram was repeated yesterday and EF is still around 30-40%.   He had moderate to severe MR and moderate TR.  Mild to moderate AI is also noticed.      ASSESSMENT AND PLAN:   1.  Frequent falls.   Unlikely to be related to arrhythmia.  He has had several episodes of falls here and no bradycardia or tachycardia was seen on telemetry.  I recommend continuing telemetry and consider a 30-day event monitor at the discharge.  2.  Cardiomyopathy.  Likely ischemic in origin.  He is scheduled for stress test today.  He may fulfill criteria for defibrillator for primary prevention if no intervention is done; however, due to advanced age and the fact that he is showing signs failure to thrive, I am not sure this is the best option for him at this time.  We will try to contact his daughter to discuss plans and see if the defibrillator is something they would be interested in, otherwise we will continue a conservative approach.    3. Afib.  Heart rate is well controlled.  4. Embolic prevention.  CHADS-Vasc of 4.  He may be a candidate for Watchmann procedure. He will follow up in clinic with us to discuss possibility of Watchmann.    Benjamin Lieberman MD    Physical Exam:  Vitals: /86 (BP Location: Left arm)  Pulse 65  Temp 97.3  F (36.3  C) (Axillary)  Resp 18  Wt 56.6 kg (124 lb 12.5 oz)  SpO2 98%  BMI 18.43 kg/m2      Intake/Output Summary (Last 24 hours) at 06/14/18 0957  Last data filed at 06/13/18 2116   Gross per 24 hour   Intake              240 ml   Output                0 ml   Net              240 ml     Vitals:    06/11/18 1700 06/13/18 0600   Weight: 56.2 kg (123 lb 12.8 oz) 56.6 kg (124 lb 12.5 oz)       Constitutional:  AAO x3.  Pt is in NAD.  HEAD: Normocephalic.  SKIN: Skin normal color, texture and turgor with no lesions or eruptions.  Eyes: PERRL, EOMI.  ENT:  Supple, normal JVP. No lymphadenopathy or thyroid enlargement.  Chest:  Decrease breath sound in base B/L  Cardiac:  IIR, variable sound of S1 and Normal S2.    Systolic murmur in LLSB II/VI.  Abdomen:  Normal BS.  Soft, non-tender and non-distended.  No rebound or guarding.    Extremities:  Pedious pulses palpable B/L.  No LE edema  noticed.   Neurological: Strength and sensation grossly symmetric and intact throughout.         Review of Systems:  Complete review of system is otherwise negative with the exception of what was described above.     CURRENT MEDICATIONS:    aspirin  81 mg Oral Daily     losartan  50 mg Oral Daily     metoprolol tartrate  50 mg Oral BID     multivitamin, therapeutic with minerals  1 tablet Oral Daily     sertraline  50 mg Oral Daily     sodium chloride (PF)  3 mL Intracatheter Q8H     torsemide (DEMADEX) tablet 10 mg  10 mg Oral Daily     PRN Meds: acetaminophen, acetaminophen, melatonin, miconazole, naloxone, ondansetron **OR** ondansetron, sodium chloride (PF), sodium chloride (PF), sodium chloride (PF), sodium chloride bacteriostatic    ALLERGIES   No Known Allergies    PAST MEDICAL HISTORY:  Past Medical History:   Diagnosis Date     afib 2004, 2010, 2012    post op and then again 2010, 2012     Anxiety 01/2018    added zoloft     ASCVD (arteriosclerotic cardiovascular disease) 2004    angio for cp and 2 vessel dz, cabg done 2004, post op afib     Cardiomyopathy (H) 11/2017    found on echo done during cvi in [facility]; echo 1/18 here with ef 38%, global hypokinesis     CKD (chronic kidney disease) stage 4, GFR 15-29 ml/min (H)      CVA (cerebral vascular accident) (H) 11/25/2017    L MCA embolism, received TPA. No residual deficits, add coumadin     Genital herpes      HTN (hypertension)      Hypercholesteremia      Pseudogout      PVD (peripheral vascular disease) with claudication (H)        PAST SURGICAL HISTORY:  Past Surgical History:   Procedure Laterality Date     CATARACT IOL, RT/LT  2006     CORONARY ARTERY BYPASS  2004     HYDROCELECTOMY SCROTAL  1997     TONSILLECTOMY       TURP  1997       FAMILY HISTORY:  Family History   Problem Relation Age of Onset     Breast Cancer Sister        SOCIAL HISTORY:  Social History     Social History     Marital status:      Spouse name: N/A     Number of  children: 3     Years of education: N/A     Occupational History           Social History Main Topics     Smoking status: Former Smoker     Quit date: 1954     Smokeless tobacco: Never Used     Alcohol use No     Drug use: No     Sexual activity: Not Currently     Other Topics Concern     Not on file     Social History Narrative         Recent Lab Results:    Recent Labs  Lab 18  0915 18  1745   WBC  --  5.7   HGB  --  10.7*   MCV  --  94   PLT  --  219   INR  --  1.20*    142   POTASSIUM 4.9 4.3   CHLORIDE 108 107   CO2 26 26   BUN 47* 48*   CR 2.11* 2.12*   ANIONGAP 7 9   GARETT 9.4 9.3   GLC 92 94                ANMOL GUZMAN MD             D: 2018   T: 2018   MT: SHAYAN      Name:     DEMI BAILEY   MRN:      [id number]        Account:       [id number]   :      10/06/1929           Consult Date:  2018      Document: [id number]       cc: Azar Zaldivar MD[LA1.1]        Revision History        User Key Date/Time User Provider Type Action    > LA1.1 2018  9:57 AM Anmol Guzman MD Physician Sign     [N/A] 2018 11:57 AM Anmol Guzman MD Physician Edit            Consults signed by Emeterio Coleman MD at 2018  8:02 AM      Author:  Emeterio Coleman MD Service:  Cardiology Author Type:  Physician    Filed:  2018  8:02 AM Date of Service:  2018  5:37 PM Creation Time:  2018  6:44 PM    Status:  Signed :  Emeterio Coleman MD (Physician)         INPATIENT CARDIOLOGY CONSULTATION  [facility].  Date of admission: 2018.  Date of consult: 2018.      REFERRAL SOURCE:   Kimberly Vang PA-C, Hospitalist Service.      REASON FOR REFERRAL:     1.  New diagnosis of cardiomyopathy.   2.  Syncope and recurrent falls.      HISTORY OF PRESENT ILLNESS:    Mr. Bailey is an elderly, frail-appearing 88-year-old gentleman with some apparent cognitive impairment on conversing with him.  His  significant other, Mingo was present in the room and able to provide corroborative medical history.  The patient himself is a retired .  Mingo is a retired  worker.      Mr. Ponce's medical history is significant for coronary artery disease, status post CABG x 2 in 2004 ( LIMA to the LAD and a vein graft to the ramus intermedius. The vein graft to the ramus is a Y graft off the left internal mammary artery.  He is also known to have a completely occluded posterior descending branch of the right coronary artery that was unable to be bypassed due to a diffusely diseased vessel that was not suitable for bypass).  Pre bypass his LVEF was 55%. Post CABG, he developed atrial fibrillation.  Initially he was on warfarin anticoagulation but this was subsequently discontinued per patient preference.  Patient was originally followed by my cardiologist colleague, Dr. Mike Gatica but subsequently has not had any cardiology follow-up for the last 10 years or so.  Other comorbidities include hyper lipidemia, remote smoker (quit in 1955), peripheral vascular disease, stage IV chronic kidney disease (creatinine 2.1-2.2 with an estimated GFR less than 30), and a recent stroke around Thanksgiving 2017.  He is not known to have diabetes or heart failure.      1.  Recurrent falls and syncope.        The patient is admitted with a history of frequent falls over the last 2 months.  Until Thanksgiving 2017, he was in his usual state of health.  Over the Thanksgiving holiday, his partner, Mingo, was driving him on a highway in [place] when she suddenly realized that Mr. Ponce had aphasia.  She pulled over to a nearby fire station, 911 was called.  He was taken to a local medical facility there brain imaging confirmed a small acute non-hemorrhagic left parietal ischemic stroke with chronic right christy lacunar infarct and significant small vessel disease.  He was given TPA with prompt resolution of his  aphasia.      Echocardiogram at the time showed akinesis in the LAD distribution and an LVEF of 35%-40% with normal right ventricular systolic function, dilated left atrium, moderate tricuspid and mitral regurgitation.  It appears he had moderate carotid artery disease.  From there, he was transferred to a rehab facility in [facility] and subsequently to [place].  He did participate in inpatient rehabilitation, but has had progressive debility, gait instability, imbalance and recurrent falls.  Unfortunately, the patient has cognitive impairment and is not able to provide a detailed description of these falls and nobody has witnessed it.  He states that the falls occur when he is sitting on the toilet seat or eating dinner and the next thing he knows is waking up on the floor.  On 1 episode he sustained a bruise on his forehead.  He has no warning signs whatsoever.  While in the hospital, he had one such episode earlier today, but unfortunately he was not on telemetry.  His resting ECG shows chronic atrial fibrillation.      2.  Cardiomyopathy.        Pre-bypass LVEF was normal at 55% with inferior wall akinesis in the context of a chronically occluded right coronary artery with good collaterals from the left.  Echocardiogram from November 2017 (care everywhere) shows a decline in LVEF to 35-40% with normal right ventricular systolic function (as documented above).  His echocardiogram in our system in February 2018 is similar and there is global hypokinesis.  Patient has no symptoms of heart failure.    3.  Chronic atrial fibrillation and question of anticoagulation.    Atrial fibrillation since 2004.  Asymptomatic.  Previously tried on warfarin 2004 and discontinued per patient preference. The reasons for this include INR variability, increased bruising and patient's daughter who lives in [place] practices homeopathic medicine and did not think warfarin was a good idea at the time.  After his stroke in  November 2017, he was appropriately started on warfarin but due to his recurrent falls, his primary care provider understandably discontinued it a couple of months ago. His CHADS-VASc score is very high risk given both his recent ischemic stroke as well as evidence of prior lacunar strokes on his brain imaging.       4.  Cognitive impairment.      Both per my interaction with him as well as his partner, Mingo's description, patient's memory and cognitive function has been failing over the last few months, most noticeably since his stroke.  He  has not had any followup with Neurology or Neuropsychiatry.       5.  Stage IV chronic kidney disease.      His recent creatinine is 2.1 with a BUN of 48 and estimated GFR of 25-30.  The etiology of his CKD is not clear.  He is not diabetic, but does have longstanding hypertension.      MEDICATIONS PRIOR TO ADMISSION:   1. Aspirin 81 mg.    2. Losartan 50 mg daily.    3. Torsemide 10 mg daily.   4. Metoprolol tartrate 50 mg b.i.d.   5. Sertraline 50 mg daily.      Please see my attached note in Epic for a comprehensive review of systems, medications, past medical, surgical, social, family history.      PHYSICAL EXAMINATION:   VITAL SIGNS:  Temperature 97.7 Fahrenheit, blood pressure 122/62 mmHg, pulse 75 per minute and regular, respiratory rate 18 per minute, saturations 94% on room air, weight 56.2 kg (123 pounds), height 1.75 meters (5 feet 9 inches), BMI 18.3 kg/m2.   CONSTITUTIONAL:  Elderly, frail gentleman who appears his stated age of 88 years.  He has a low BMI.  He has cognitive impairment with his ability to recall events.  Poor muscle mass.  He has a bruise on the top of his head from one of his recent falls.   EYES:  Mild pallor.  No xanthelasma.   ENT:  No cyanosis.   NECK:  No thyromegaly.   RESPIRATORY:  A few bibasilar rales, but no wheeze.   CARDIOVASCULAR:  JVP is normal.  Carotid pulse is irregular, consistent with atrial fibrillation with normal volume.  No  carotid bruit.  Apical impulse normal to palpation.  Midline well-healed sternotomy scar.  Heart sounds are irregularly irregular.  No audible murmur, S3 or S4.   ABDOMEN:  Soft and nontender.  No hepatosplenomegaly or masses.  Active bowel sounds.  SKIN:  Bruise on the top of his head as above.  No other bruising.   MUSCULOSKELETAL:  Poor generalized muscle strength consistent with significant physical deconditioning.  Gait not assessed.  No obvious spinal deformities.   NEUROPSYCHIATRIC:  He is oriented to time and place, but has difficulty recalling even familiar people's names and events pertaining to his recent medical history.   EXTREMITIES:  1+ pitting edema.  No clubbing or deformities.      DIAGNOSES:   1.  Recurrent falls and possible syncope.      The concerning features are the lack of any warning symptoms/prodrome and he slumps forward when he is eating dinner or sitting on the toilet seat.  His resting ECG shows chronic atrial fibrillation.  There is no evidence of bradyarrhythmias on his current hospitalization.  He is also on a beta blocker for his chronic A fib.  While telemetry in the hospital is going to be beneficial, I think other testing such as event monitor will not be as helpful because the he does not get warning symptoms. An implantable loop recorder or REVEAL device maybe indicated.  I will involve my Electrophysiology colleagues. Please obtain neurology consultation.    2.  Chronic atrial fibrillation and anticoagulation challenges.      Clearly the patient has a very high CHADS-VASc score and anticoagulation is indicated.  Equally with his recurrent falls, gait instability and frailty, he is at risk of intracerebral and other major bleeds from anticoagulation.  Recommend electrophysiology input for a left atrial appendage occlusion device candidacy. For now, continue on metoprolol tartrate.     3.  Left ventricular systolic cardiomyopathy.    LVEF 35-40% first documented in November  2017.  Prior to that we do not have an assessment of LV function since 2004.  Etiology unclear. Diffuse hypokinesis.  He also has 14-year-old coronary grafts and known CAD.  He also has increased wall thickness of his left ventricle, that is suspicious for an infiltrative cardiomyopathy such as amyloid.  Cardiac MRI would be ideal but due to GFR less than 30, gadolinium is contraindicated. Proceed to a pharmacological nuclear stress test.     4.  Cognitive impairment.      Based on his brain imaging from outside institution he has evidence of other chronic, clinically non-apparent strokes, which could be due to a combination of hypertension, carotid artery disease or from un-anticoagulated atrial fibrillation.  I recommend a Neurology consult and neuropsychiatric testing to rule out vascular or other dementia.       5. Thank you for consulting us.  General Cardiology will follow along for management of his cardiomyopathy.  For syncope and anticoagulation issues I will place an Electrophysiology consult.  I spent 60 minutes in the care of this complex patient, greater than 50% of the time was spent in counseling and coordination of care.         EMETERIO COLEMAN MD             D: 2018   T: 2018   MT:       Name:     DEMI BAILEY   MRN:      [id number]        Account:       [id number]   :      10/06/1929           Consult Date:  2018      Document: [id number][MJ1.1]         Revision History        User Key Date/Time User Provider Type Action    > MJ1.1 2018  8:02 AM Emeterio Coleman MD Physician Sign     [N/A] 2018  7:52 AM Emeterio Coleman MD Physician Edit     [N/A] 2018  7:38 AM Emeterio Coleman MD Physician Edit     [N/A] 2018  6:44 PM Emeterio Coleman MD Physician Edit            Consults by Emeterio Coleman MD at 2018  3:01 PM     Author:  Emeterio Coleman MD Service:  Cardiology Author Type:  Physician    Filed:  2018   "5:37 PM Date of Service:  6/12/2018  3:01 PM Creation Time:  6/12/2018  3:01 PM    Status:  Signed :  Emeterio Coleman MD (Physician)     Consult Orders:    1. Cardiology IP Consult: Patient to be seen: Routine - within 24 hours; \"new\" Cardiomyopathy fall 2017 no cardiology follow up. Now with recurrent falls ? syncope; Consultant may enter orders: Yes [[id number]] ordered by Kimberly Vang PA-C at 06/11/18 1731                Inpatient Cardiology Consultation   [facility]  Date of Admission:6/11/2018  Date of consult:[MJ1.1] 6/12/2018.[MJ1.2]      RECOMMENDATIONS:  Inpatient cardiology consultation note has been dictated. Dictation number[MJ1.1] [id number][MJ1.2]        Emeterio Coleman MD [facility]  Cardiology              REVIEW OF SYSTEMS:  A comprehensive 10 point review of systems was completed and the pertinent positives are documented in history of present illness.    MEDICATIONS:  Prior to Admission Medications   Prescriptions Last Dose Informant Patient Reported? Taking?   LOSARTAN POTASSIUM PO 6/10/2018 at Unknown time Spouse/Significant Other Yes Yes   Sig: Take 50 mg by mouth daily   TORSEMIDE PO 6/10/2018 at Unknown time Spouse/Significant Other Yes Yes   Sig: Take 10 mg by mouth daily   aspirin EC 81 MG EC tablet 6/10/2018 at Unknown time Spouse/Significant Other No Yes   Sig: Take 1 tablet (81 mg) by mouth daily   clotrimazole (LOTRIMIN) 1 % cream 6/10/2018 at Unknown time Spouse/Significant Other Yes Yes   Sig: Apply topically 2 times daily Apply to feet and between toes   clotrimazole-betamethasone (LOTRISONE) cream 6/10/2018 at Unknown time Spouse/Significant Other No Yes   Sig: Apply topically 2 times daily   metoprolol tartrate (LOPRESSOR) 25 MG tablet  Spouse/Significant Other No No   Sig: Take 2 tablets (50 mg) by mouth 2 times daily   sertraline (ZOLOFT) 50 MG tablet 6/10/2018 at Unknown time Spouse/Significant Other No Yes   Sig: Take 1 tablet (50 mg) by mouth " daily      Facility-Administered Medications: None       ALLERGIES:  No Known Allergies    PAST MEDICAL HISTORY:  Past Medical History:   Diagnosis Date     afib 2004, 2010, 2012    post op and then again 2010, 2012     Anxiety 01/2018    added zoloft     ASCVD (arteriosclerotic cardiovascular disease) 2004    angio for cp and 2 vessel dz, cabg done 2004, post op afib     Cardiomyopathy (H) 11/2017    found on echo done during cvi in [facility]; echo 1/18 here with ef 38%, global hypokinesis     CKD (chronic kidney disease) stage 4, GFR 15-29 ml/min (H)      CVA (cerebral vascular accident) (H) 11/25/2017    L MCA embolism, received TPA. No residual deficits, add coumadin     Genital herpes      HTN (hypertension)      Hypercholesteremia      Pseudogout      PVD (peripheral vascular disease) with claudication (H)        PAST SURGICAL HISTORY:  Past Surgical History:   Procedure Laterality Date     CATARACT IOL, RT/LT  2006     CORONARY ARTERY BYPASS  2004     HYDROCELECTOMY SCROTAL  1997     TONSILLECTOMY       TURP  1997       SOCIAL HISTORY:   Bj Ponce  reports that he quit smoking about 64 years ago. He has never used smokeless tobacco. He reports that he does not drink alcohol or use illicit drugs.    FAMILY HISTORY:  Family History   Problem Relation Age of Onset     Breast Cancer Sister               Emeterio Coleman MD[MJ1.1]       Revision History        User Key Date/Time User Provider Type Action    > MJ1.2 6/12/2018  5:37 PM Emeterio Coleman MD Physician Sign     MJ1.1 6/12/2018  3:01 PM Emeterio Coleman MD Physician                      Progress Notes - Physician (Notes from 06/15/18 through 06/18/18)      Progress Notes by Barry Ladd at 6/18/2018  2:18 PM     Author:  Barry Ladd Service:  Spiritual Health Author Type:      Filed:  6/18/2018  2:21 PM Date of Service:  6/18/2018  2:18 PM Creation Time:  6/18/2018  2:18 PM    Status:  Signed :  Barry Ladd  ()         SPIRITUAL HEALTH SERVICES Progress Note  [facility] 66    SH, LOS visit. The patient and daughter reported having a spiritual care from clergy family members a , [place] Professor, and Kale. The patient reported not being interested in  but went on talked about having good family support.     provided care in presence/listening.    Coping with family support    SH has no further plans.         Barry Ladd  Chaplain Resident[DC1.1]     Revision History        User Key Date/Time User Provider Type Action    > DC1.1 6/18/2018  2:21 PM Barry Ladd Sign            Progress Notes by Reina David PT at 6/18/2018 11:28 AM     Author:  Reina David PT Service:  (none) Author Type:  Physical Therapist    Filed:  6/18/2018 11:29 AM Date of Service:  6/18/2018 11:28 AM Creation Time:  6/18/2018 11:28 AM    Status:  Signed :  Reina David PT (Physical Therapist)            06/18/18 1052   Quick Adds   Type of Visit Initial PT Evaluation   Living Environment   Lives With alone   Living Arrangements apartment   Home Accessibility no concerns   Number of Stairs to Enter Home 0   Number of Stairs Within Home 0   Living Environment Comment Patient lives in 8th floor independent living apartment with elevator access. Patient has assistance with cleaning and receives 2 meals per day.   Self-Care   Usual Activity Tolerance good   Current Activity Tolerance good   Equipment Currently Used at Home walker, rolling  (4WW)   Functional Level Prior   Ambulation 1-->assistive equipment   Transferring 1-->assistive equipment   Toileting 1-->assistive equipment   Bathing 0-->independent   Dressing 0-->independent   Eating 0-->independent   Fall history within last six months yes   Number of times patient has fallen within last six months 6   Which of the above functional risks had a recent onset or change? ambulation;transferring   General Information   Onset of  Illness/Injury or Date of Surgery - Date 06/11/18   Referring Physician Pa Upton MD   Patient/Family Goals Statement Return home   Pertinent History of Current Problem (include personal factors and/or comorbidities that impact the POC) Patient admitted on 6/11/18 for evaluation of recurrent falls. Patient with PMH of CAD, a fib, cardiomyopathy, CKD stage IV, anxiety, dyslipidemia, pseudogout, peripheral vascular disease   Precautions/Limitations fall precautions   Weight-Bearing Status - LLE full weight-bearing   Weight-Bearing Status - RLE full weight-bearing   General Observations Patient sitting up in chair upon PT arrival; daughter present for session   Cognitive Status Examination   Orientation orientation to person, place and time   Level of Consciousness alert   Pain Assessment   Patient Currently in Pain No   Posture    Posture Forward head position;Protracted shoulders;Kyphosis   Range of Motion (ROM)   ROM Quick Adds No deficits were identified   Strength   Strength Comments With formal strength testing: bilateral hip flexion 4-/5; knee extension/flexion, ankle dorsiflexio 4/5   Transfer Skills   Transfer Comments Patient completed sit to stand transfer from chair with use of 4WW and SBA. Patient with increased forward flexed posture upon standing   Gait   Gait Comments Patient ambulated 15 feet with use of 4WW and close CGA x 2 due to recurrent history of falls and history of suddenly falling asleep prior to fall. Patient with slow and shuffling gait pattern and forward flexed posture.   Balance   Balance Comments Patient mildly unsteady on feet with use of 4WW. Minor path deviations noted but patient able to self correct without additional assistance   General Therapy Interventions   Planned Therapy Interventions balance training;gait training;strengthening;transfer training;bed mobility training   Clinical Impression   Criteria for Skilled Therapeutic Intervention yes, treatment indicated  "  PT Diagnosis Decreased mobility secondary to global weakness and recurrent falls   Influenced by the following impairments decreased strength, decreased independence with transfers and ambulation, decreased ambulation distance   Functional limitations due to impairments decreased ambulation and indepenence with mobility   Clinical Presentation Stable/Uncomplicated   Clinical Presentation Rationale medically improving   Clinical Decision Making (Complexity) Low complexity   Therapy Frequency` daily   Predicted Duration of Therapy Intervention (days/wks) 4 days   Anticipated Discharge Disposition Transitional Care Facility   Risk & Benefits of therapy have been explained Yes   Patient, Family & other staff in agreement with plan of care Yes   [facility] TM \"6 Clicks\"   2016, Trustees of [facility], under license to [employer].  All rights reserved.   6 Clicks Short Forms Basic Mobility Inpatient Short Form   [facility] AM-PAC  \"6 Clicks\" V.2 Basic Mobility Inpatient Short Form   1. Turning from your back to your side while in a flat bed without using bedrails? 4 - None   2. Moving from lying on your back to sitting on the side of a flat bed without using bedrails? 3 - A Little   3. Moving to and from a bed to a chair (including a wheelchair)? 3 - A Little   4. Standing up from a chair using your arms (e.g., wheelchair, or bedside chair)? 3 - A Little   5. To walk in hospital room? 3 - A Little   6. Climbing 3-5 steps with a railing? 2 - A Lot   Basic Mobility Raw Score (Score out of 24.Lower scores equate to lower levels of function) 18   Total Evaluation Time   Total Evaluation Time (Minutes) 10[KV1.1]        Revision History        User Key Date/Time User Provider Type Action    > KV1.1 6/18/2018 11:29 AM Reina David, PT Physical Therapist Sign            Progress Notes by Pa Upton MD at 6/17/2018  2:14 PM     Author:  Pa Upton MD Service:  " Hospitalist Author Type:  Physician    Filed:  6/17/2018  2:31 PM Date of Service:  6/17/2018  2:14 PM Creation Time:  6/17/2018  2:14 PM    Status:  Signed :  Pa Upton MD (Physician)         [facility]    Hospitalist Progress Note    Interval History   - Alexandra daughter visiting from [place]  - Patient reports good sleep last night. Nighttime oximetry essentially normal  - PT/OT reordered today, possible discharge to TCU tomorrow    Assessment & Plan   Summary:Bj Ponce is a 88 year old male who was admitted on 6/11/2018.   He has a history of coronary artery disease, atrial fibrillation, CKD stage IV, cardiomyopathy, anxiety and 1-2 month of increasing falls versus syncope, who was admitted directly from clinic for evaluation of recurrent falls. Noted to be falling asleep frequently while inpatient. Evaluated by both neurology and cardiology without any clear neurological or cardiac related cause to his sleeping spells. Noted fall 6/16 while in bathroom, probably due to a sleeping/syncopal spell. Oximetry study 6/16-6/17 essentially normal.      Recurrent falls vs syncope  Possible sleeping disorder  Neurology and cardiology both consulted, no clear neurologic or cardiac etiology. Had abnormal stress test however given his frequent falls, advanced disease and chronic kidney disease cardiology recommending medical management at this point.  Patient did have episodes of somnolence and patient and family worried about narcolepsy getting worse lately. Neurology consulted, normal EEG and no acute changes on MRI.   Patient able to be awoken from his sleeping spells so it appears narcolepsy is less likely and a sleep disorder or lack of sleep more likely.  - EP recommending getting discharged with event monitor, f/u with EP  - Neurology recommends sleep study  - PT OT recommending TCU  - Discussed with sleep center MD--will repeat oximetry study again and see whether patient can  discharge to sleep center for sleep study, followed by going to TCU afterwards. Dr. No [phone]     Deconditioning: Poor activity for several months since his CVA. PT OT recommending TCU    Chronic/Stable  CVA 11/2017, no major residual: Has no focal neurological deficits but states has lost his strength ever since then which might be contributing to his recurrent falls; he was at [facility] TCU following stroke; might need long-term nursing home placement. He was started on a statin after his stroke in November; however, this was discontinued due to elevated LFTs      Chronic afib/flutter  CAD, ischemic cardiomyopathy  High CHADS-VASC score however not anticoagulation candidate secondary to frequent falls. EP following him and planning to follow-up in the clinic to discuss possible watchman procedure. Echo 6/12 with EF 30-40% with WMA. Currently A. fib with controlled ventricular rate. Noted abnormal stress test and given his chronic kidney disease frequent falls and other medical comorbidities cardiology recommending medical management at this point.    - Continue torsemide, Lopressor and losartan, aspirin .  Statin discontinued for his elevated LFTs..      Anxiety: Continue Zoloft. Unlikely to be primary cause of patient's falls.  CKD4: Near baseline Cr 2.1.    DVT Prophylaxis: Pneumatic Compression Devices  Code Status: Full Code  PT/OT: Ordered    Disposition: Expected discharge tomorrow to TCU per PT    Pa Upton MD  Text Page  (7am to 6pm)  -Data reviewed today: I reviewed all new labs and imaging results over the last 24 hours.    # Pain Assessment:  Current Pain Score 6/17/2018   Patient currently in pain? denies   Pain score (0-10) -   Bj churchill pain level was assessed and he currently denies pain.        Physical Exam   Temp: 97.5  F (36.4  C) Temp src: Oral BP: 150/80   Heart Rate: 69 Resp: 16 SpO2: 97 % O2 Device: None (Room air) Oxygen Delivery: 1.5 LPM  Vitals:    06/11/18 1700  06/13/18 0600   Weight: 56.2 kg (123 lb 12.8 oz) 56.6 kg (124 lb 12.5 oz)     Vital Signs with Ranges  Temp:  [97.4  F (36.3  C)-98.1  F (36.7  C)] 97.5  F (36.4  C)  Heart Rate:  [61-77] 69  Resp:  [16-18] 16  BP: (136-150)/(69-80) 150/80  SpO2:  [88 %-99 %] 97 %  I/O last 3 completed shifts:  In: 120 [P.O.:120]  Out: -   O2 requirements: None    Constitutional: Male in NAD  HEENT: Eyes nonicteric, oral mucosa moist  Cardiovascular: RRR, normal S1/2, no m/r/g  Respiratory: CTAB, no wheezing or crackles  Vascular: 1-2+ LE pitting edema, noted distal pedal pulses  GI: Normoactive bowel sounds, nontender, nondistended  Skin/Integumen: Notable bruising in UE and LE, including skin tears over R elbow and wrist where patient fell on 6/16  Neuro/Psych: Appropriate affect and mood. A&Ox3, moves all extremities. Falls asleep mid-sentence but can be woken up with voice each time.    Medications       aspirin  81 mg Oral Daily     losartan  50 mg Oral Daily     metoprolol tartrate  50 mg Oral BID     multivitamin, therapeutic with minerals  1 tablet Oral Daily     sertraline  50 mg Oral Daily     sodium chloride (PF)  3 mL Intracatheter Q8H     torsemide (DEMADEX) tablet 10 mg  10 mg Oral Daily       Data     Recent Labs  Lab 06/17/18  0830 06/14/18  1218 06/14/18  0915 06/11/18  1745   WBC 5.5 6.3  --  5.7   HGB 11.1* 10.9*  --  10.7*   MCV 93 95  --  94    235  --  219   INR  --   --   --  1.20*     --  141 142   POTASSIUM 4.4  --  4.9 4.3   CHLORIDE 108  --  108 107   CO2 22  --  26 26   BUN 55*  --  47* 48*   CR 2.23*  --  2.11* 2.12*   ANIONGAP 10  --  7 9   GARETT 9.1  --  9.4 9.3   GLC 78  --  92 94       Imaging:   No results found for this or any previous visit (from the past 24 hour(s)).[SF1.1]       Revision History        User Key Date/Time User Provider Type Action    > SF1.1 6/17/2018  2:31 PM Pa Upton MD Physician Sign            Progress Notes by Marilyn Waite LIC at 6/17/2018   9:57 AM     Author:  Marilyn Waite LICSW Service:  (none) Author Type:      Filed:  6/17/2018 10:03 AM Date of Service:  6/17/2018  9:57 AM Creation Time:  6/17/2018  9:57 AM    Status:  Addendum :  Marilyn Waite LICSW ()         SW:  D:  Social Work is following for d/c planning.  Have spoken with daughter Alexandra twice and writer greatly appreciates the conversation Dr Upton had in updating her of patient's work up.  PT evaluated patient in OBS as their protocol however was not notified of patient's change to In Patient status and thus have not seen him since 6/12.  The anticipated d/c plan is to a TCU[KH1.1] based on PT evaluation  and nursing notes reflecting patient's current mobility.[KH1.2]    Writer has asked MD if he can re-order PT for while.    P: Will discuss TCU options with patient and keep daughter Alexandra updated.[KH1.1]     Revision History        User Key Date/Time User Provider Type Action    > [N/A] 6/17/2018 10:03 AM Marilyn Waite LICSW  Addend     KH1.2 6/17/2018 10:01 AM Marilyn Waite LICSW  Sign     KH1.1 6/17/2018  9:57 AM Marilyn Waite LICSW              Progress Notes by Concepcion Valdes RT at 6/17/2018  5:55 AM     Author:  Concepcion Valdes RT Service:  (none) Author Type:  Respiratory Therapist    Filed:  6/17/2018  5:56 AM Date of Service:  6/17/2018  5:55 AM Creation Time:  6/17/2018  5:55 AM    Status:  Signed :  Concepcion Valdes RT (Respiratory Therapist)         Overnight study completed. overnight pulse Oximetry study was started with romm air, and was placed 1.5 lpm. Spo2 88-99%[MF1.1]     Revision History        User Key Date/Time User Provider Type Action    > MF1.1 6/17/2018  5:56 AM Concepcion Valdes RT Respiratory Therapist Sign            Progress Notes by Valarie Lawson RN at 6/16/2018  6:57 PM     Author:  Valarie Lawson RN Service:  (none) Author Type:  Registered Nurse    Filed:   6/16/2018  6:58 PM Date of Service:  6/16/2018  6:57 PM Creation Time:  6/16/2018  6:57 PM    Status:  Signed :  Valarie Lawson RN (Registered Nurse)         Flying Sidhu RN paged to restart PIV. IV access lost and needs PIV as patient is on tele. Patient has poor veins.[AB1.1]     Revision History        User Key Date/Time User Provider Type Action    > AB1.1 6/16/2018  6:58 PM Valarie Lawson RN Registered Nurse Sign            Progress Notes by Pa Upton MD at 6/16/2018  2:32 PM     Author:  Pa Upton MD Service:  Hospitalist Author Type:  Physician    Filed:  6/16/2018  2:35 PM Date of Service:  6/16/2018  2:32 PM Creation Time:  6/16/2018  2:32 PM    Status:  Signed :  Pa Upton MD (Physician)         I had a long discussion with Alexandra (daughter) over the phone about her father's current situation. I discussed with her the Neurology and Cardiology's assessments and recommendations. Alexandra brought up several issues including orthostatic hypotension, polypharmacy, hallucinations related to lack of sleep which were answered. Alexandra's  also said that patient has had isues with falling asleep in the chair for years before his current spells of falling asleep frequently.    Will continue to proceed with nighttime oximetry study today and speak with sleep center tomorrow.[SF1.1]    Pa Upton[SF1.2]         Revision History        User Key Date/Time User Provider Type Action    > SF1.2 6/16/2018  2:35 PM Pa Upton MD Physician Sign     SF1.1 6/16/2018  2:32 PM Pa Upton MD Physician             Progress Notes by Lois Mitchell RN at 6/16/2018 11:51 AM     Author:  Lois Mitchell RN Service:  (none) Author Type:  Registered Nurse    Filed:  6/16/2018 11:52 AM Date of Service:  6/16/2018 11:51 AM Creation Time:  6/16/2018 11:51 AM    Status:  Signed :  Lois Mitchell RN (Registered Nurse)         2nd attempt to contact  daughterAlexandra, who is POA to update RE: fall. Left voice message requesting call back.[VM1.1]     Revision History        User Key Date/Time User Provider Type Action    > VM1.1 6/16/2018 11:52 AM Lois Mitchell RN Registered Nurse Sign            Progress Notes by Lois Mitchell RN at 6/16/2018  9:40 AM     Author:  Lois Mitchell RN Service:  (none) Author Type:  Registered Nurse    Filed:  6/16/2018 11:51 AM Date of Service:  6/16/2018  9:40 AM Creation Time:  6/16/2018 11:48 AM    Status:  Signed :  Lois Mitchell RN (Registered Nurse)         Called daughter Alexandra to update on fall, left voice message requesting call back. Did speak with friend, Mingo and updated on status and fall. Mingo will attempt to call Alexandra as well.[VM1.1]     Revision History        User Key Date/Time User Provider Type Action    > VM1.1 6/16/2018 11:51 AM Lois Mitchell RN Registered Nurse Sign            Progress Notes by Pa Upton MD at 6/16/2018 10:38 AM     Author:  Pa Upton MD Service:  Hospitalist Author Type:  Physician    Filed:  6/16/2018 10:48 AM Date of Service:  6/16/2018 10:38 AM Creation Time:  6/16/2018 10:38 AM    Status:  Signed :  Pa Upton MD (Physician)         [facility]    Hospitalist Progress Note    Interval History   - Patient fell in bathroom this morning on his right side, no LOC, XR and CT without evidence of fracture. Patient does not completely remember what happened but endorses  - Patient reports declining oximetry study due to faulty hardware. He then described something about the plastic melting on the hardware. I discussed with him that we would repeat the oximetry study again today, but if it cannot be done, then these studies may need to be deferred to the outpatient setting  - Continues to have sleeping spells during my conversation--easily arousable    Assessment & Plan   Summary:Bj Ponce is a 88 year old male who was  admitted on 6/11/2018.   He has a history of coronary artery disease, atrial fibrillation, CKD stage IV, cardiomyopathy, anxiety and 1-2 month of increasing falls versus syncope, who was admitted directly from clinic for evaluation of recurrent falls. Noted to be falling asleep frequently while inpatient. Evaluated by both neurology and cardiology without any clear neurological or cardiac related cause to his sleeping spells. Noted fall 6/16 while in bathroom, probably due to a sleeping/syncopal spell. Oximetry study planned 6/16-6/17.      Recurrent falls vs syncope  Possible sleeping disorder  Neurology and cardiology both consulted, no clear neurologic or cardiac etiology. Had abnormal stress test however given his frequent falls, advanced disease and chronic kidney disease cardiology recommending medical management at this point.  Patient did have episodes of somnolence and patient and family worried about narcolepsy getting worse lately. Neurology consulted, normal EEG and no acute changes on MRI.   Patient able to be awoken from his sleeping spells so it appears narcolepsy is less likely and a sleep disorder more likely.  - EP recommending getting discharged with event monitor, f/u with EP  - Neurology recommends sleep study  - PT OT recommending TCU  - Discussed with sleep center MD--will repeat oximetry study again and see whether patient can discharge to sleep center for sleep study, followed by going to TCU afterwards     Deconditioning: Poor activity for several months since his CVA. PT OT recommending TCU    Chronic/Stable  CVA 11/2017, no major residual: Has no focal neurological deficits but states has lost his strength ever since then which might be contributing to his recurrent falls; he was at [facility] TCU following stroke; might need long-term nursing home placement. He was started on a statin after his stroke in November; however, this was discontinued due to elevated LFTs      Chronic  afib/flutter  CAD, ischemic cardiomyopathy:  -High CHADS-VASC score however not anticoagulation candidate secondary to frequent falls. EP following him and planning to follow-up in the clinic to discuss possible watchman procedure. Currently A. fib with controlled ventricular rate. Noted abnormal stress test and given his chronic kidney disease frequent falls and other medical comorbidities cardiology recommending medical management at this point.    - Continue torsemide, Lopressor and losartan, aspirin .  Statin discontinued for his elevated LFTs..      Anxiety: Continue Zoloft. Unlikely to be primary cause of patient's falls.  CKD4: Near baseline Cr 2.1.    DVT Prophylaxis: Pneumatic Compression Devices  Code Status: Full Code  PT/OT: Ordered    Disposition: Expected discharge in 1-2 days pending oximetry study    Pa Upton MD  Text Page  (7am to 6pm)  -Data reviewed today: I reviewed all new labs and imaging results over the last 24 hours.    # Pain Assessment:  Current Pain Score 6/16/2018   Patient currently in pain? denies   Pain score (0-10) -   Bj s pain level was assessed and he currently denies pain.        Physical Exam   Temp: 97.5  F (36.4  C) Temp src: Oral BP: 146/81 Pulse: 65 Heart Rate: 62 Resp: 18 SpO2: 95 % O2 Device: None (Room air)    Vitals:    06/11/18 1700 06/13/18 0600   Weight: 56.2 kg (123 lb 12.8 oz) 56.6 kg (124 lb 12.5 oz)     Vital Signs with Ranges  Temp:  [95.5  F (35.3  C)-97.5  F (36.4  C)] 97.5  F (36.4  C)  Pulse:  [65] 65  Heart Rate:  [59-70] 62  Resp:  [18] 18  BP: (138-148)/(78-83) 146/81  SpO2:  [95 %-99 %] 95 %     O2 requirements: None    Constitutional: Male in NAD  HEENT: Eyes nonicteric, oral mucosa moist  Cardiovascular: RRR, normal S1/2, no m/r/g  Respiratory: CTAB, no wheezing or crackles  Vascular: 1-2+ LE pitting edema, noted distal pedal pulses  GI: Normoactive bowel sounds, nontender, nondistended  Skin/Integumen: Notable bruising in UE and LE,  including skin tears over R elbow and wrist where patient fell on 6/16  Neuro/Psych: Appropriate affect and mood. A&Ox3, moves all extremities. Falls asleep mid-sentence but can be woken up with voice each time.    Medications       aspirin  81 mg Oral Daily     losartan  50 mg Oral Daily     metoprolol tartrate  50 mg Oral BID     multivitamin, therapeutic with minerals  1 tablet Oral Daily     sertraline  50 mg Oral Daily     sodium chloride (PF)  3 mL Intracatheter Q8H     torsemide (DEMADEX) tablet 10 mg  10 mg Oral Daily       Data     Recent Labs  Lab 06/14/18  1218 06/14/18  0915 06/11/18  1745   WBC 6.3  --  5.7   HGB 10.9*  --  10.7*   MCV 95  --  94     --  219   INR  --   --  1.20*   NA  --  141 142   POTASSIUM  --  4.9 4.3   CHLORIDE  --  108 107   CO2  --  26 26   BUN  --  47* 48*   CR  --  2.11* 2.12*   ANIONGAP  --  7 9   GARETT  --  9.4 9.3   GLC  --  92 94       Imaging:   Recent Results (from the past 24 hour(s))   CT Head w/o Contrast    Narrative    CT OF THE HEAD WITHOUT CONTRAST 6/16/2018 9:42 AM     COMPARISON: Brain MR 6/14/2018.    HISTORY: Fall, hit head.    TECHNIQUE: 5 mm thick axial CT images of the head were acquired  without IV contrast material.    FINDINGS:  There is moderate diffuse cerebral volume loss. There are  subtle patchy areas of decreased density in the cerebral white matter  bilaterally that are consistent with sequela of chronic small vessel  ischemic disease.     The ventricles and basal cisterns are within normal limits in  configuration given the degree of cerebral volume loss.  There is no  midline shift. There are no extra-axial fluid collections.     No intracranial hemorrhage, mass or recent infarct.    The visualized paranasal sinuses are well-aerated. There is no  mastoiditis. There are no fractures of the visualized bones.      Impression    IMPRESSION:  Diffuse cerebral volume loss and cerebral white matter  changes consistent with chronic small vessel  "ischemic disease. No  evidence for acute intracranial pathology.       Radiation dose for this scan was reduced using automated exposure  control, adjustment of the mA and/or kV according to patient size, or  iterative reconstruction technique.   XR Pelvis w Hip Right 1 View    Narrative    PELVIS AND HIP RIGHT ONE VIEW  6/16/2018 10:00 AM     COMPARISON: None.    HISTORY: Fall, right hip pain.     FINDINGS: The visualized bones and joint spaces are within normal  limits.      Impression    IMPRESSION: No evidence for fracture, dislocation or significant  degenerative change of the pelvis or right hip.   XR Elbow Right G/E 3 Views    Narrative    RIGHT ELBOW THREE OR MORE VIEWS  6/16/2018 10:01 AM     COMPARISON: None.    HISTORY: Fall, hit right elbow.      Impression    IMPRESSION: There is osteophytic spurring of the radial head. No  fractures noted. No significant right elbow joint effusion.[SF1.1]          Revision History        User Key Date/Time User Provider Type Action    > SF1.1 6/16/2018 10:48 AM Pa Upton MD Physician Sign            Progress Notes by Marilia Boykin RN at 6/16/2018  2:22 AM     Author:  Marilia Boykin RN Service:  (none) Author Type:  Registered Nurse    Filed:  6/16/2018  2:26 AM Date of Service:  6/16/2018  2:22 AM Creation Time:  6/16/2018  2:22 AM    Status:  Signed :  Marilia Boykin RN (Registered Nurse)         Patient refusing telemetry and to wearing overnight pulse oximetry, keeps stating that items are broken.  Patient ripped off finger probe x 2 and telemetry patches.  This writer and charge RN tried to explain to patient the importance of keeping these in place overnight.  Patient stating \"I am the patient and I want to post pone this until tomorrow night.\"    This writer unable to get telemetry strip for shift.  Overnight pulse oximetry was on until 0115.[DK1.1]      Revision History        User Key Date/Time User Provider Type Action "    > DK1.1 6/16/2018  2:26 AM Marilia Boykin RN Registered Nurse Sign            Progress Notes by Valarie Lawson RN at 6/15/2018  6:48 PM     Author:  Valarie Lawson RN Service:  (none) Author Type:  Registered Nurse    Filed:  6/15/2018  6:49 PM Date of Service:  6/15/2018  6:48 PM Creation Time:  6/15/2018  6:48 PM    Status:  Signed :  Valarie Lawson RN (Registered Nurse)         Spoke with RT. RT to set up overnight oximetry study between 2100 and 2130 tonight.[AB1.1]     Revision History        User Key Date/Time User Provider Type Action    > AB1.1 6/15/2018  6:49 PM Valarie Lawson RN Registered Nurse Sign            Progress Notes by Pa Upton MD at 6/15/2018  4:51 PM     Author:  Pa Upton MD Service:  Hospitalist Author Type:  Physician    Filed:  6/15/2018  4:59 PM Date of Service:  6/15/2018  4:51 PM Creation Time:  6/15/2018  4:51 PM    Status:  Signed :  Pa Upton MD (Physician)         [facility]    Hospitalist Progress Note    Interval History   - Partner Mingo at bedside  - Jayden alert and oriented but did fall asleep several times during my conversation. As he would start to doze off, I would call his name and he would very reliable instantly perk back up.    Assessment & Plan   Summary:Bj Ponce is a 88 year old male who was admitted on 6/11/2018.   He has a history of coronary artery disease, atrial fibrillation, CKD stage IV, cardiomyopathy, anxiety and 1-2 month of increasing falls versus syncope, who was admitted directly from clinic for evaluation of recurrent falls. Noted to be falling asleep frequently while inpatient. Evaluated by both neurology and cardiology without any clear neurological or cardiac related cause to his sleeping spells. Oximetry study overnight on 6/15-6/16.      Recurrent falls vs syncope  Possible sleeping disorder  Neurology and cardiology both consulted, no clear neurologic or cardiac  etiology. Had abnormal stress test however given his frequent falls, advanced disease and chronic kidney disease cardiology recommending medical management at this point.  Patient did have episodes of somnolence and patient and family worried about narcolepsy getting worse lately. Neurology consulted, normal EEG and no acute changes on MRI.   Patient able to be awoken from his sleeping spells so it appears narcolepsy is less likely and a sleep disorder more likely.  - EP recommending getting discharged with event monitor, f/u with EP  - Neurology recommends sleep study  - PT OT recommending TCU  - Discussed with sleep center MD--will get oximetry study and see whether patient can discharge to sleep center for sleep study, followed by going to TCU afterwards     Deconditioning: Poor activity for several months since his CVA. PT OT recommending TCU    Chronic/Stable  CVA 11/2017, no major residual: Has no focal neurological deficits but states has lost his strength ever since then which might be contributing to his recurrent falls; he was at [facility] TCU following stroke; might need long-term nursing home placement. He was started on a statin after his stroke in November; however, this was discontinued due to elevated LFTs      Chronic afib/flutter  CAD, ischemic cardiomyopathy:  -High CHADS-VASC score however not anticoagulation candidate secondary to frequent falls. EP following him and planning to follow-up in the clinic to discuss possible watchman procedure. Currently A. fib with controlled ventricular rate. Noted abnormal stress test and given his chronic kidney disease frequent falls and other medical comorbidities cardiology recommending medical management at this point.    - Continue torsemide, Lopressor and losartan, aspirin .  Statin discontinued for his elevated LFTs..      Anxiety: Continue Zoloft. Unlikely to be primary cause of patient's falls.  CKD4: Near baseline Cr 2.1.    DVT Prophylaxis:  Pneumatic Compression Devices  Code Status: Full Code  PT/OT: Ordered    Disposition: Expected discharge in 1-2 days pending oximetry study    Pa Upton MD  Text Page  (7am to 6pm)  -Data reviewed today: I reviewed all new labs and imaging results over the last 24 hours.    # Pain Assessment:  Current Pain Score 6/15/2018   Patient currently in pain? denies   Pain score (0-10) -   Bj churchill pain level was assessed and he currently denies pain.        Physical Exam   Temp: 95.5  F (35.3  C) (done with portable- room not reading) Temp src: Oral BP: 148/83 Pulse: 68 Heart Rate: 59 Resp: 18 SpO2: 99 % O2 Device: None (Room air)    Vitals:    06/11/18 1700 06/13/18 0600   Weight: 56.2 kg (123 lb 12.8 oz) 56.6 kg (124 lb 12.5 oz)     Vital Signs with Ranges  Temp:  [95.5  F (35.3  C)-98.1  F (36.7  C)] 95.5  F (35.3  C)  Pulse:  [68] 68  Heart Rate:  [59-75] 59  Resp:  [18] 18  BP: (120-148)/(66-93) 148/83  SpO2:  [92 %-99 %] 99 %     O2 requirements: None    Constitutional: Male in NAD  HEENT: Eyes nonicteric, oral mucosa moist  Cardiovascular: RRR, normal S1/2, no m/r/g  Respiratory: CTAB, no wheezing or crackles  Vascular: 1-2+ LE pitting edema, noted distal pedal pulses  GI: Normoactive bowel sounds, nontender, nondistended  Skin/Integumen: No rashes  Neuro/Psych: Appropriate affect and mood. A&Ox3, moves all extremities. Falls asleep intermittently but can be woken up with voice each time.    Medications       aspirin  81 mg Oral Daily     losartan  50 mg Oral Daily     metoprolol tartrate  50 mg Oral BID     multivitamin, therapeutic with minerals  1 tablet Oral Daily     sertraline  50 mg Oral Daily     sodium chloride (PF)  3 mL Intracatheter Q8H     torsemide (DEMADEX) tablet 10 mg  10 mg Oral Daily       Data     Recent Labs  Lab 06/14/18  1218 06/14/18  0915 06/11/18  1745   WBC 6.3  --  5.7   HGB 10.9*  --  10.7*   MCV 95  --  94     --  219   INR  --   --  1.20*   NA  --  141 142   POTASSIUM   --  4.9 4.3   CHLORIDE  --  108 107   CO2  --  26 26   BUN  --  47* 48*   CR  --  2.11* 2.12*   ANIONGAP  --  7 9   GARETT  --  9.4 9.3   GLC  --  92 94       Imaging:   Recent Results (from the past 24 hour(s))   MR Brain w/o Contrast    Narrative    MRI BRAIN WITHOUT CONTRAST  6/14/2018 11:06 PM    HISTORY:  Syncopal spells.     TECHNIQUE:  Multiplanar, multisequence MRI of the brain without  gadolinium IV contrast material.    COMPARISON:  CT scan 6/11/2018.    FINDINGS:  There is generalized atrophy of the brain. White matter  changes are seen in the cerebral hemispheres consistent with sequelae  of small vessel ischemic disease. Focal paramagnetic effect is seen in  the right paramedian christy consistent with an old hemorrhage. There is  no evidence of acute hemorrhage, infarct or mass. Mild  encephalomalacia and gliosis is seen in the occipital lobes  bilaterally.    There is an intraocular implant in the left globe. Otherwise facial  structures appear normal. The arteries at the base of the brain and  the dural venous sinuses appear patent.      Impression    IMPRESSION:    1. No acute abnormality.  2. Brain atrophy and white matter changes consistent with sequelae of  small vessel ischemic disease.  3. Old hemorrhage in the right paramedian christy.  4. Encephalomalacia and gliosis in the occipital lobes.    DRU ALEXANDER MD[SF1.1]          Revision History        User Key Date/Time User Provider Type Action    > SF1.1 6/15/2018  4:59 PM Pa Upton MD Physician Sign            Progress Notes by Adilene Diggs at 6/15/2018 11:00 AM     Author:  Adilene Diggs Service:  Nutrition Author Type:  Dietician    Filed:  6/15/2018  1:33 PM Date of Service:  6/15/2018 11:00 AM Creation Time:  6/15/2018 11:00 AM    Status:  Attested :  Adilene Diggs (Dietician)    Cosigner:  Ava Davis RD, LD at 6/15/2018  2:39 PM        Attestation signed by Ava Davis RD, LD at 6/15/2018  2:39 PM      "   I have reviewed and agree with the note written below.  Ava Davis, RD, LD, CNSC   Clinical Dietitian - [facility]                                    CLINICAL NUTRITION SERVICES - REASSESSMENT NOTE      Malnutrition (6/12):  % Weight Loss:  > 10% in 6 months (severe malnutrition)  % Intake:  <75% for >/= 3 months (non-severe malnutrition)  Subcutaneous Fat Loss:  Orbital region moderate depletion and Upper arm region moderate-severe depletion  Muscle Loss:  Temporal region severe depletion  Fluid Retention:  None noted     Malnutrition Diagnosis: Severe malnutrition  In Context of:  Acute on Chronic illness or disease       EVALUATION OF PROGRESS TOWARD GOALS   Diet:[AM1.1] Regular (room service with assist)[AM1.2]    Intake:[AM1.1] Per patient this afternoon: Still no appetite, states that \"I used to eat just fine until a few days ago,\" and reports that he keeps falling asleep in front of his meals. Does not want nutrition supplements.[AM1.2]  Per flow sheets: consuming % of 1-3 meals daily.    Last BM 6/14 (x 2)    NEW FINDINGS:   6/14:[AM1.1] EEG = consistent with a mild encephalopathy   6/15: Psychology consult: Mild neurocognitive disorder, unspecified etiology[AM1.2]     Vitals:    06/11/18 1700 06/13/18 0600   Weight: 56.2 kg (123 lb 12.8 oz) 56.6 kg (124 lb 12.5 oz)[AM1.3]       Previous Goals:   Pt to consume >50% of meals BID  Evaluation:[AM1.1] No met[AM1.2]    Previous Nutrition Diagnosis:   Inadequate oral intake related to reliance on living facility for all meals as evidenced by weight loss of >10% over 6 months with moderate to severe fat and muscle losses  Evaluation: No change      CURRENT NUTRITION DIAGNOSIS[AM1.1]  Inadequate oral intake related to decreased appetite and lethargy/drowsiness as evidenced by weight loss of >10% over 6 months with moderate to severe fat and muscle losses.[AM1.2]     INTERVENTIONS  Recommendations / Nutrition " Prescription[AM1.1]  Continue diet as tolerated, encouraging PO intake[AM1.2]     Implementation[AM1.1]  None at this time.[AM1.2]     Goals[AM1.1]  Pt to consume >50% of meals BID.[AM1.2]      MONITORING AND EVALUATION:[AM1.1]  Progress towards goals will be monitored and evaluated per protocol and Practice Guidelines[AM1.2]          Maru Raymond[AM1.1]        Revision History        User Key Date/Time User Provider Type Action    > AM1.2 6/15/2018  1:33 PM Adilene Diggs Sign     AM1.3 6/15/2018 11:02 AM Adilene Diggs      AM1.1 6/15/2018 11:00 AM Adilene Diggs             Progress Notes by Iona Ghotra APRN CNP at 6/15/2018  9:15 AM     Author:  Iona Ghotra APRN CNP Service:  Neurology Author Type:  Nurse Practitioner    Filed:  6/15/2018  1:28 PM Date of Service:  6/15/2018  9:15 AM Creation Time:  6/15/2018  9:15 AM    Status:  Signed :  Iona Ghotra APRN CNP (Nurse Practitioner)         [facility]  Neurology Daily Note      Admission Date:6/11/2018   Date of service: 06/15/2018   Hospital Day: 5      Assessment & Plan   _______________________________  #. (R55) Syncope, unspecified syncope type  --CT head negative for acute abnormality  --appears to fall asleep while conversing - he is not always aware  --MRI brain unremarkable  --EEG negative for seizures, slowed/encephalopathic  --would recommend evaluation after discharge with a sleep provider for further evaluation and possibly sleep study  #. (R29.6) Recurrent falls  (primary encounter diagnosis)  --management per primary service  #. (I48.2) Permanent atrial fibrillation (H)  --management per hospitalist & cardiology  #. DVT Prophylaxis  --per primary service  #. PT/OT/Speech  --continue evaluations  #. Nutrition / GI Prophylaxis  --Per recommendations of speech therapy    Code Status: Full Code    Disposition: per primary service  No further neurological  workup recommended at this time. Will need to be set up with sleep clinic after discharge for further evaluation    Interval History   _______________________________  Patient presented with recurrent falls. He has had several ED visits and short hospital stays. Multiple times when patient just falls over, no warning, including while seated on a chair or the toilet. Cardiology evaluated and recommend event monitor at discharge. On exam, no focal neurological findings. Patient does fall asleep during conversation a few times. MRI brain negative for acute abnormality. EEG was abnormal, but no seizures noted. Patient did not like being attached to the EEG machine and not being able to move.  Today,[MC1.1] no significant change. Still falling asleep during conversations. Patient not aware he is doing so.[MC1.2]     Review of Systems   _______________________________  The Review of Systems is negative other than noted in the HPI  Physical Exam   _______________________________  Vitals: Temp: 98.1  F (36.7  C) Temp src: Oral BP: (!) 141/93 Pulse: 68 Heart Rate: 75 Resp: 18 SpO2: 92 % O2 Device: None (Room air)    Vital Signs with Ranges: Temp:  [97.6  F (36.4  C)-98.1  F (36.7  C)] 98.1  F (36.7  C)  Pulse:  [68] 68  Heart Rate:  [67-75] 75  Resp:  [18] 18  BP: (120-141)/(66-93) 141/93  SpO2:  [92 %] 92 %    General Appearance:  No acute distress  Neuro:       Mental Status Exam:   Awake, alert, oriented X3. Periodically falls asleep during conversation. Speech and language are intact. Mental status is normal       Cranial Nerves:  Pupils 3 mm, reactive. EOMI. Face sensation is normal. Face is symmetric. Tongue and uvula are midline. Other CN are normal           Motor:  5/5 X 4. Tone and bulk are normal           Reflexes:  Normal DTR. Toes downgoing.        Sensory:  Normal to light touch               Coordination:   Intact finger-to-nose        Gait:  Up with assistance  Abdomen: Soft, not tender, not  distended  Extremities: No clubbing, no cyanosis, no edema    Medications   _______________________________      aspirin  81 mg Oral Daily     losartan  50 mg Oral Daily     metoprolol tartrate  50 mg Oral BID     multivitamin, therapeutic with minerals  1 tablet Oral Daily     sertraline  50 mg Oral Daily     sodium chloride (PF)  3 mL Intracatheter Q8H     torsemide (DEMADEX) tablet 10 mg  10 mg Oral Daily       Data   _______________________________      Lab Data:   All data was reviewed by me personally  CBC RESULTS:  Recent Labs   Lab Test  06/14/18   1218  06/11/18   1745 06/03/18   1230   WBC  6.3  5.7  6.1   RBC  3.55*  3.50*  3.78*   HGB  10.9*  10.7*  11.7*   HCT  33.8*  32.8*  35.7*   PLT  235  219  249     Basic Metabolic Panel:  Recent Labs   Lab Test  06/14/18   0915  06/11/18   1745  06/03/18   1230   NA  141  142  142   POTASSIUM  4.9  4.3  4.6   CHLORIDE  108  107  109   CO2  26  26  28   BUN  47*  48*  49*   CR  2.11*  2.12*  1.95*   GLC  92  94  96   GARETT  9.4  9.3  9.6     Liver panel:  Recent Labs   Lab Test  01/25/18   1449 12/12/17 12/06/16   0936   PROTTOTAL  6.9   --   7.5   ALBUMIN  2.4*   --   3.0*   BILITOTAL  0.7   --   0.6   ALKPHOS  227*   --   182*   AST  43  34  34   ALT  25  40  38     Coagulation  Recent Labs   Lab Test  06/11/18   1745  06/03/18   1230 05/18/18 05/08/18 04/24/18   INR  1.20*  1.49*  2.8*  2.5*  3.7*      Lipid Profile:  Recent Labs   Lab Test  12/06/16   0936  10/05/15   0858  08/05/14   0819   CHOL  151  161  142   HDL  50  54  53   LDL  78  82  64   TRIG  115  123  124   CHOLHDLRATIO   --   3.0  2.7     Thyroid Panel:  Recent Labs   Lab Test  06/11/18   1745  01/25/18   1449  10/05/15   0858   TSH  2.17  2.42  1.93      UA Results:  Recent Labs   Lab Test  06/11/18   1925   COLOR  Yellow   APPEARANCE  Clear   URINEGLC  Negative   URINEBILI  Negative   URINEKETONE  Negative   SG  1.011   UBLD  Negative   URINEPH  6.0   PROTEIN  100*   NITRITE  Negative   LEUKEST   Small*   RBCU  <1   WBCU  12*        Cardiac US:   Left ventricular systolic function is moderately reduced. The visual ejection fraction is estimated at 30-40%. There is moderate global hypokinesia of the left ventricle. There is inferolateral wall akinesis. There is moderate biatrial enlargement. Immobile mitral valve posterior leaflet is responsible for the jet of moderate to mod-severe (2-3+ or 3+) mitral regurgitation. There is at least moderate (2+) tricuspid regurgitation. Right ventricular systolic pressure is elevated, consistent with moderate to severe pulmonary hypertension. The ascending aorta is mildly dilated. There is mild to moderate (1-2+) aortic Regurgitation. The rhythm was atrial fibrillation. Compared to the prior echo, there has been a marked increase in mitral and tricuspid insufficiency.       Neurophysiology:   EEG 6/14/18:  This 1-hour video EEG in the awake state appears to be abnormal.  There is some intermittent generalized slowing which is consistent with a mild encephalopathy.  It is important to note the patient is moving around constantly during the recording and he was oriented and able to answer questions, so it is difficult to tell if some of the slowing is movement artifact or actual cerebral activity, but it was seen throughout the recording.  I suspect that it is background slowing.  Otherwise, the patient does have a well-formed parietooccipital rhythm.  No electrographic seizures or epileptiform discharges were seen.        Imaging:   All imaging studies were reviewed personally  CT head 6/11/18:   Diffuse cerebral volume loss and cerebral white matter changes consistent with chronic small vessel ischemic disease. No evidence for acute intracranial pathology.    MRI brain 6/14/18:   1. No acute abnormality.  2. Brain atrophy and white matter changes consistent with sequelae of small vessel ischemic disease.  3. Old hemorrhage in the right paramedian christy.  4.  Encephalomalacia and gliosis in the occipital lobes.        Text Page    Iona Ghotra, MSN, FNP-BC, RN CNRN[MC1.1]       Revision History        User Key Date/Time User Provider Type Action    > MC1.2 6/15/2018  1:28 PM Iona Ghotra APRN CNP Nurse Practitioner Sign     MC1.1 6/15/2018  9:15 AM Iona Ghotra APRN CNP Nurse Practitioner                      Procedure Notes      Procedures signed by Randi Farah MD at 6/15/2018  6:31 PM      Author:  Randi Farah MD Service:  Neurology Author Type:  Physician    Filed:  6/15/2018  6:31 PM Encounter Date:  6/14/2018 Status:  Signed    :  Randi Farah MD (Physician)       Procedure Orders:    1. EEG [[id number]] ordered by Randi Farah MD at 06/14/18 2124                Procedure Date: 06/14/2018      ONE-HOUR ELECTROENCEPHALOGRAM WITH VIDEO       ORDERING PROVIDER:         EEG #, one-hour with video.      DATE OF SERVICE:  06/14/2018.      PATIENT INFORMATION:  He is an 88-year-old male who presents with syncope and has recurrent falls.  EEG is being done to evaluate for seizures.  The patient is on aspirin, losartan, metoprolol, sertraline and Demadex.     TECHNICAL SUMMARY: This video EEG monitoring procedure was performed with 23 scalp electrodes in 10-20 system placements, and additional scalp, precordial and other surface electrodes used for electrical referencing and artifact detection. Video was reviewed intermittently by EEG technologist and physician for electroclinical seizures.      BACKGROUND:  In the fully awake state, throughout the EEG patient had movement artifact, electrode artifact.  It seems Fz and CZ is very flat, but I suspect that is artifactual.  It is on a bipolar montage FZ, CZ is flat, but on a referential montage we are able to read at these individual electrodes and there is no significant abnormalities, but it seems that he has low voltage EEG.  I read his EEG at a sensitivity of 5.  He has a  parietooccipital rhythm up to 8 Hz and throughout the recording it appears that there is movement artifact, but there also does appear to be intermittent generalized theta slowing.  The patient does not fall asleep.  He is awake the entire time.      ACTIVATION PROCEDURES:  Photic stimulation and hyperventilation were not done.      EPILEPTIFORM DISCHARGES:  None.      ICTAL:  None.      IMPRESSION:  This 1-hour video EEG in the awake state is abnormal.  There is intermittent generalized slowing which is consistent with a mild encephalopathy.  It is important to note the patient is moving around constantly during the recording and he was oriented and able to answer questions, so it is difficult to tell if some of the slowing is due to movement artifact, but, it was seen throughout the recording.  I suspect that it is background slowing due to encephalopathy.  No electrographic seizures or epileptiform discharges were seen.         REGINA HYMAN MD             D: 2018   T: 2018   MT: GABRIELE      Name:     DEMI BAILEY   MRN:      [id number]        Account:        [id number]   :      10/06/1929           Procedure Date: 2018      Document: [id number][SP1.1]         Revision History        User Key Date/Time User Provider Type Action    > SP1.1 6/15/2018  6:31 PM Regina Hyman MD Physician Sign     [N/A] 2018  9:24 PM Regina Hyman MD Physician Edit                     Progress Notes - Therapies (Notes from 06/15/18 through 18)      Progress Notes by Reina David PT at 2018 11:28 AM     Author:  Reina David PT Service:  (none) Author Type:  Physical Therapist    Filed:  2018 11:29 AM Date of Service:  2018 11:28 AM Creation Time:  2018 11:28 AM    Status:  Signed :  Reina David PT (Physical Therapist)            18 1052   Quick Adds   Type of Visit Initial PT Evaluation   Living Environment   Lives With alone   Living  Arrangements apartment   Home Accessibility no concerns   Number of Stairs to Enter Home 0   Number of Stairs Within Home 0   Living Environment Comment Patient lives in 8th floor independent living apartment with elevator access. Patient has assistance with cleaning and receives 2 meals per day.   Self-Care   Usual Activity Tolerance good   Current Activity Tolerance good   Equipment Currently Used at Home walker, rolling  (4WW)   Functional Level Prior   Ambulation 1-->assistive equipment   Transferring 1-->assistive equipment   Toileting 1-->assistive equipment   Bathing 0-->independent   Dressing 0-->independent   Eating 0-->independent   Fall history within last six months yes   Number of times patient has fallen within last six months 6   Which of the above functional risks had a recent onset or change? ambulation;transferring   General Information   Onset of Illness/Injury or Date of Surgery - Date 06/11/18   Referring Physician Pa Upton MD   Patient/Family Goals Statement Return home   Pertinent History of Current Problem (include personal factors and/or comorbidities that impact the POC) Patient admitted on 6/11/18 for evaluation of recurrent falls. Patient with PMH of CAD, a fib, cardiomyopathy, CKD stage IV, anxiety, dyslipidemia, pseudogout, peripheral vascular disease   Precautions/Limitations fall precautions   Weight-Bearing Status - LLE full weight-bearing   Weight-Bearing Status - RLE full weight-bearing   General Observations Patient sitting up in chair upon PT arrival; daughter present for session   Cognitive Status Examination   Orientation orientation to person, place and time   Level of Consciousness alert   Pain Assessment   Patient Currently in Pain No   Posture    Posture Forward head position;Protracted shoulders;Kyphosis   Range of Motion (ROM)   ROM Quick Adds No deficits were identified   Strength   Strength Comments With formal strength testing: bilateral hip flexion 4-/5;  "knee extension/flexion, ankle dorsiflexio 4/5   Transfer Skills   Transfer Comments Patient completed sit to stand transfer from chair with use of 4WW and SBA. Patient with increased forward flexed posture upon standing   Gait   Gait Comments Patient ambulated 15 feet with use of 4WW and close CGA x 2 due to recurrent history of falls and history of suddenly falling asleep prior to fall. Patient with slow and shuffling gait pattern and forward flexed posture.   Balance   Balance Comments Patient mildly unsteady on feet with use of 4WW. Minor path deviations noted but patient able to self correct without additional assistance   General Therapy Interventions   Planned Therapy Interventions balance training;gait training;strengthening;transfer training;bed mobility training   Clinical Impression   Criteria for Skilled Therapeutic Intervention yes, treatment indicated   PT Diagnosis Decreased mobility secondary to global weakness and recurrent falls   Influenced by the following impairments decreased strength, decreased independence with transfers and ambulation, decreased ambulation distance   Functional limitations due to impairments decreased ambulation and indepenence with mobility   Clinical Presentation Stable/Uncomplicated   Clinical Presentation Rationale medically improving   Clinical Decision Making (Complexity) Low complexity   Therapy Frequency` daily   Predicted Duration of Therapy Intervention (days/wks) 4 days   Anticipated Discharge Disposition Transitional Care Facility   Risk & Benefits of therapy have been explained Yes   Patient, Family & other staff in agreement with plan of care Yes   [facility] [employer]-PAC TM \"6 Clicks\"   2016, Trustees of [facility], under license to [employer].  All rights reserved.   6 Clicks Short Forms Basic Mobility Inpatient Short Form   [facility] AM-PAC  \"6 Clicks\" V.2 Basic Mobility Inpatient Short Form   1. Turning from your back to your side while in " a flat bed without using bedrails? 4 - None   2. Moving from lying on your back to sitting on the side of a flat bed without using bedrails? 3 - A Little   3. Moving to and from a bed to a chair (including a wheelchair)? 3 - A Little   4. Standing up from a chair using your arms (e.g., wheelchair, or bedside chair)? 3 - A Little   5. To walk in hospital room? 3 - A Little   6. Climbing 3-5 steps with a railing? 2 - A Lot   Basic Mobility Raw Score (Score out of 24.Lower scores equate to lower levels of function) 18   Total Evaluation Time   Total Evaluation Time (Minutes) 10[KV1.1]        Revision History        User Key Date/Time User Provider Type Action    > KV1.1 6/18/2018 11:29 AM Reina David PT Physical Therapist Sign            Progress Notes by Concepcion Valdes RT at 6/17/2018  5:55 AM     Author:  Concepcion Valdes RT Service:  (none) Author Type:  Respiratory Therapist    Filed:  6/17/2018  5:56 AM Date of Service:  6/17/2018  5:55 AM Creation Time:  6/17/2018  5:55 AM    Status:  Signed :  Concepcion Valdes RT (Respiratory Therapist)         Overnight study completed. overnight pulse Oximetry study was started with romm air, and was placed 1.5 lpm. Spo2 88-99%[MF1.1]     Revision History        User Key Date/Time User Provider Type Action    > MF1.1 6/17/2018  5:56 AM Concepcion Valdes RT Respiratory Therapist Sign

## 2018-06-11 NOTE — IP AVS SNAPSHOT
"          [place] [address] [facility]: [phone]                                              INTERAGENCY TRANSFER FORM - LAB / IMAGING / EKG / EMG RESULTS   2018                    Hospital Admission Date: 2018  DEMI BAILEY   : 10/6/1929  Sex: Male        Attending Provider: Jonathan Dove DO     Allergies:  No Known Allergies    Infection:  None   Service:  INTERNAL MED    Ht:  1.753 m (5' 9\")   Wt:  55.2 kg (121 lb 9.6 oz)   Admission Wt:  56.2 kg (123 lb 12.8 oz)    BMI:  17.96 kg/m 2   BSA:  1.64 m 2            Patient PCP Information     Provider PCP Type    Azar Zaldivar MD General         Lab Results - 3 Days      Basic metabolic panel [[id number]] (Abnormal)  Resulted: 18, Result status: Final result    Ordering provider: Pa Upton MD  18 0000 Resulting lab: [facility]    Specimen Information    Type Source Collected On   Blood  18 0830          Components       Value Reference Range Flag Lab   Sodium 140 133 - 144 mmol/L  FrStHsLb   Potassium 4.4 3.4 - 5.3 mmol/L  FrStHsLb   Chloride 108 94 - 109 mmol/L  FrStHsLb   Carbon Dioxide 22 20 - 32 mmol/L  FrStHsLb   Anion Gap 10 3 - 14 mmol/L  FrStHsLb   Glucose 78 70 - 99 mg/dL  FrStHsLb   Urea Nitrogen 55 7 - 30 mg/dL H FrStHsLb   Creatinine 2.23 0.66 - 1.25 mg/dL H FrStHsLb   GFR Estimate 28 >60 mL/min/1.7m2 L FrStHsLb   Comment:  Non  GFR Calc   GFR Estimate If Black 34 >60 mL/min/1.7m2 L FrStHsLb   Comment:  African American GFR Calc   Calcium 9.1 8.5 - 10.1 mg/dL  FrStHsLb            CBC with platelets differential [[id number]] (Abnormal)  Resulted: 18 0853, Result status: Final result    Ordering provider: Pa Upton MD  18 0000 Resulting lab: [facility]    Specimen Information    Type Source Collected On   Blood  18 0830          Components       Value Reference Range Flag Lab   WBC 5.5 4.0 - 11.0 10e9/L  " FrStHsLb   RBC Count 3.63 4.4 - 5.9 10e12/L L FrStHsLb   Hemoglobin 11.1 13.3 - 17.7 g/dL L FrStHsLb   Hematocrit 33.9 40.0 - 53.0 % L FrStHsLb   MCV 93 78 - 100 fl  FrStHsLb   MCH 30.6 26.5 - 33.0 pg  FrStHsLb   MCHC 32.7 31.5 - 36.5 g/dL  FrStHsLb   RDW 15.6 10.0 - 15.0 % H FrStHsLb   Platelet Count 229 150 - 450 10e9/L  FrStHsLb   Diff Method Automated Method   FrStHsLb   % Neutrophils 71.0 %  FrStHsLb   % Lymphocytes 16.3 %  FrStHsLb   % Monocytes 9.6 %  FrStHsLb   % Eosinophils 2.7 %  FrStHsLb   % Basophils 0.2 %  FrStHsLb   % Immature Granulocytes 0.2 %  FrStHsLb   Nucleated RBCs 0 0 /100  FrStHsLb   Absolute Neutrophil 3.9 1.6 - 8.3 10e9/L  FrStHsLb   Absolute Lymphocytes 0.9 0.8 - 5.3 10e9/L  FrStHsLb   Absolute Monocytes 0.5 0.0 - 1.3 10e9/L  FrStHsLb   Absolute Eosinophils 0.2 0.0 - 0.7 10e9/L  FrStHsLb   Absolute Basophils 0.0 0.0 - 0.2 10e9/L  FrStHsLb   Abs Immature Granulocytes 0.0 0 - 0.4 10e9/L  FrStHsLb   Absolute Nucleated RBC 0.0   FrStHsLb            Testing Performed By     Lab - Abbreviation Name Director Address Valid Date Range    14 - FrStHsLb [facility] Unknown [address] [place] [address] 05/08/15 1057 - Present            Unresulted Labs     None         Imaging Results - 3 Days      CT Head w/o Contrast [[id number]]  Resulted: 06/16/18 1123, Result status: Final result    Ordering provider: Clement Ulloa APRN CNP  06/16/18 0916 Resulted by: Paddy Murry MD    Performed: 06/16/18 0935 - 06/16/18 0942 Resulting lab: RADIOLOGY RESULTS    Narrative:       CT OF THE HEAD WITHOUT CONTRAST 6/16/2018 9:42 AM     COMPARISON: Brain MR 6/14/2018.    HISTORY: Fall, hit head.    TECHNIQUE: 5 mm thick axial CT images of the head were acquired  without IV contrast material.    FINDINGS:  There is moderate diffuse cerebral volume loss. There are  subtle patchy areas of decreased density in the cerebral white matter  bilaterally that are consistent with sequela of chronic  small vessel  ischemic disease.     The ventricles and basal cisterns are within normal limits in  configuration given the degree of cerebral volume loss.  There is no  midline shift. There are no extra-axial fluid collections.     No intracranial hemorrhage, mass or recent infarct.    The visualized paranasal sinuses are well-aerated. There is no  mastoiditis. There are no fractures of the visualized bones.      Impression:       IMPRESSION:  Diffuse cerebral volume loss and cerebral white matter  changes consistent with chronic small vessel ischemic disease. No  evidence for acute intracranial pathology.       Radiation dose for this scan was reduced using automated exposure  control, adjustment of the mA and/or kV according to patient size, or  iterative reconstruction technique.    MICAH MURRY MD      XR Pelvis w Hip Right 1 View [[id number]]  Resulted: 06/16/18 1116, Result status: Final result    Ordering provider: Clement Ulloa APRN CNP  06/16/18 0919 Resulted by: Micah Murry MD    Performed: 06/16/18 0944 - 06/16/18 1000 Resulting lab: RADIOLOGY RESULTS    Narrative:       PELVIS AND HIP RIGHT ONE VIEW  6/16/2018 10:00 AM     COMPARISON: None.    HISTORY: Fall, right hip pain.     FINDINGS: The visualized bones and joint spaces are within normal  limits.      Impression:       IMPRESSION: No evidence for fracture, dislocation or significant  degenerative change of the pelvis or right hip.    MICAH MURRY MD      XR Elbow Right G/E 3 Views [[id number]]  Resulted: 06/16/18 1116, Result status: Final result    Ordering provider: Clement Ulloa APRN CNP  06/16/18 0919 Resulted by: Micah Murry MD    Performed: 06/16/18 0944 - 06/16/18 1001 Resulting lab: RADIOLOGY RESULTS    Narrative:       RIGHT ELBOW THREE OR MORE VIEWS  6/16/2018 10:01 AM     COMPARISON: None.    HISTORY: Fall, hit right elbow.      Impression:       IMPRESSION: There is osteophytic spurring of the radial head.  No  fractures noted. No significant right elbow joint effusion.    MICAH BISHOP MD      MR Brain w/o Contrast [[id number]]  Resulted: 06/15/18 1410, Result status: Final result    Ordering provider: Iona Ghotra APRN CNP  06/14/18 1254 Resulted by: Dru Alexander MD    Performed: 06/14/18 2238 - 06/14/18 2306 Resulting lab: RADIOLOGY RESULTS    Narrative:       MRI BRAIN WITHOUT CONTRAST  6/14/2018 11:06 PM    HISTORY:  Syncopal spells.     TECHNIQUE:  Multiplanar, multisequence MRI of the brain without  gadolinium IV contrast material.    COMPARISON:  CT scan 6/11/2018.    FINDINGS:  There is generalized atrophy of the brain. White matter  changes are seen in the cerebral hemispheres consistent with sequelae  of small vessel ischemic disease. Focal paramagnetic effect is seen in  the right paramedian christy consistent with an old hemorrhage. There is  no evidence of acute hemorrhage, infarct or mass. Mild  encephalomalacia and gliosis is seen in the occipital lobes  bilaterally.    There is an intraocular implant in the left globe. Otherwise facial  structures appear normal. The arteries at the base of the brain and  the dural venous sinuses appear patent.      Impression:       IMPRESSION:    1. No acute abnormality.  2. Brain atrophy and white matter changes consistent with sequelae of  small vessel ischemic disease.  3. Old hemorrhage in the right paramedian christy.  4. Encephalomalacia and gliosis in the occipital lobes.    DRU ALEXANDER MD      Testing Performed By     Lab - Abbreviation Name Director Address Valid Date Range    104 - Rad Rslts RADIOLOGY RESULTS Unknown Unknown 02/16/05 1553 - Present               ECG/EMG Results      Echocardiogram Complete [[id number]]  Resulted: 06/12/18 1450, Result status: In process    Ordering provider: Kimberly Vang PA-C  06/11/18 1649 Performed: 06/12/18 1449 - 06/12/18 1449    Resulting lab: RADIANT             ECHO COMPLETE WITH OPTISON [[id number]]   Resulted: 18 1450, Result status: In process    Ordering provider: Cholo Lopez PA-C  18 164 Performed: 18 1449 - 18 1449    Resulting lab: [facility]             Echocardiogram Complete [[id number]]  Resulted: 18 1451, Result status: Edited Result - FINAL    Ordering provider: Cholo Lopez PA-C  18 164 Resulted by: Kerri Tafoya MD    Performed: 18 1449 - 18 1536 Resulting lab: RADIOLOGY RESULTS    Narrative:       [id number]  [facility]  [id number]  [id number]^JOHN^CHOLO^MAY           [facility]  Echocardiography Laboratory  [address] [place] [address]        Name: DEMI BAILEY  MRN: [id number]  : 10/06/1929  Study Date: 2018 02:51 PM  Age: 88 yrs  Gender: Male  Patient Location: [facility]  Reason For Study: Syncope  Ordering Physician: CHOLO LOPEZ  Referring Physician: Azar Zaldivar  Performed By: [place] RDCS     BSA: 1.7 m2  Height: 69 in  Weight: 123 lb  HR: 65  BP: 139/80 mmHg  _____________________________________________________________________________  __        Procedure  Complete Portable Echo Adult.  _____________________________________________________________________________  __        Interpretation Summary     Left ventricular systolic function is moderately reduced. The visual ejection  fraction is estimated at 30-40%.  There is moderate global hypokinesia of the left ventricle. There is  inferolateral wall akinesis.  There is moderate biatrial enlargement.  Immobile mitral valve posterior leaflet is responsible for the jet of moderate  to mod-severe (2-3+ or 3+) mitral regurgitation.  There is at least moderate (2+) tricuspid regurgitation. Right ventricular  systolic pressure is elevated, consistent with moderate to severe pulmonary  hypertension.  The ascending aorta is mildly dilated. There is mild to moderate (1-2+) aortic  regurgitation.  The rhythm was atrial fibrillation.  Compared to  the prior echo, there has been a marked increase in mitral and  tricuspid insufficiency.  _____________________________________________________________________________  __        Left Ventricle  The left ventricle is normal in size. There is mild concentric left  ventricular hypertrophy. Left ventricular systolic function is moderately  reduced. The visual ejection fraction is estimated at 30-40%. Diastolic  Doppler findings (E/E' ratio and/or other parameters) suggest left ventricular  filling pressures are increased. There is inferolateral wall akinesis. There  is moderate global hypokinesia of the left ventricle.     Right Ventricle  The right ventricle is mild to moderately dilated. The right ventricle is not  well visualized. The right ventricular systolic function is moderately  reduced.     Atria  There is moderate biatrial enlargement. There is no atrial shunt seen.     Mitral Valve  Immobile mitral valve posterior leaflet. There is moderate to mod-severe (2-  3+) mitral regurgitation. The mitral regurgitant jet is posteriorly directed,  which is consistent with anterior leaflet pathology. PV sytolic blunting is  prominent. The mitral regurgitant jet is eccentrically directed.        Tricuspid Valve  The right ventricular systolic pressure is approximated at 40mmHg plus the  right atrial pressure. There is moderate (2+) tricuspid regurgitation. Dilated  IVC (>2.5cm) with no respiratory collapse; right atrial pressure is estimated  at >20mmHg. Right ventricular systolic pressure is elevated, consistent with  moderate to severe pulmonary hypertension.     Aortic Valve  There is mild to moderate (1-2+) aortic regurgitation. This aortic  regurgitation is central valvular. No hemodynamically significant valvular  aortic stenosis.     Pulmonic Valve  There is trace to mild pulmonic valvular regurgitation. PAD is 9 mmHg + RAP.  There is no pulmonic valvular stenosis.     Vessels  The aortic Sinus(es) of Valsalva  are borderline dilated. The ascending aorta  is Mildly dilated. It is 4.3 cm.     Pericardium  The pericardium appears normal.        Rhythm  The rhythm was atrial fibrillation.  _____________________________________________________________________________  __  MMode/2D Measurements & Calculations  IVSd: 1.5 cm     LVIDd: 5.1 cm  LVIDs: 4.2 cm  LVPWd: 1.2 cm  FS: 16.1 %  LV mass(C)d: 270.0 grams  LV mass(C)dI: 160.7 grams/m2  Ao root diam: 3.9 cm  LA dimension: 5.5 cm  asc Aorta Diam: 4.3 cm  LA/Ao: 1.4  RWT: 0.46        Doppler Measurements & Calculations  MV E max ave: 79.4 cm/sec  MV dec time: 0.16 sec  PA acc time: 0.07 sec  E/E' av.8  Lateral E/e': 13.1  Medial E/e': 24.6              _____________________________________________________________________________  __        Report approved by: Swapna Perez 2018 05:40 PM       1    Type Source Collected On     18 1451          View Image (below)              Encounter-Level Documents:     There are no encounter-level documents.      Order-Level Documents:     There are no order-level documents.

## 2018-06-11 NOTE — IP AVS SNAPSHOT
` ` Patient Information     Patient Name Sex     Bj Ponce ([id number]) Male 10/6/1929       Room Bed    6603 6603-02      Patient Demographics     Address Phone E-mail Address    [address]   [place] [address] [phone] (Home) [email or web address]      Patient Ethnicity & Race     Ethnic Group Patient Race     White      Emergency Contact(s)     Name Relation Home Work Mobile    Xuan Welsh Friend [phone]    Alexandra Petty: POA Daughter   [phone]      Documents on File        Status Date Received Description       Documents for the Patient    HIM GABRIELLA Authorization - File Only   [facility] GABRIELLA    Privacy Notice - Trion Received 12     Insurance Card Received () 18 Medicare &  BCBS Platinum Blue    External Medication Information Consent Accepted () 12     Patient ID Received 18 mn dl 2020    Consent for Services - Hospital/Clinic Received () 12     HIM GABRIELLA Authorization   ORIGINAL CHART GIVEN TO DR. MAE    HIM GABRIELLA Authorization - File Only   [address] GABRIELLA [address] Melanie [facility]    Consent for EHR Access  13 Copied from existing Consent for services - C/HOD collected on 2012    [facility] Specified Other       Consent for Services - Hospital/Clinic Received () 13     External Medication Information Consent Accepted 13 Lifetime    Consent for Services - Hospital/Clinic Received () 14     Consent for Services - Hospital/Clinic Received () 10/05/15     Consent for Services/Privacy Notice - Hospital/Clinic Received () 16     Care Everywhere Prospective Auth Received 18     Consent for Services/Privacy Notice - Hospital/Clinic Received 18     Insurance Card Received 18 [employer] 2018    Insurance Card Received 18 MEDICARE    Advance Directives and Living Will Not Received  Validation of AD 2017     Advance Directives and Living Will Received 01/16/18 Health Care Directive 12/11/2017    HIM GABRIELLA Authorization - File Only  02/08/18 [facility]    Advance Directives and Living Will Received 02/26/18 POLST 01/17/2018    Advance Directives and Living Will Received 03/13/18 POLST 02/06/2018    Consent to Communicate Received 04/10/18 [place]/Medical/Billing    Consent to Communicate  04/16/18 AUTHORIZATION TO DISCUSS PROTECTED  HEALTH INFORMATION 4/10/18    Consent for Services - Hospital and Clinic Received 04/24/18     HIE Auth Received 04/24/18        Documents for the Encounter    CMS IM for Patient Signature Received 06/14/18     Observation Notice Received 06/12/18     CMS ESQUIVEL for Patient Signature Received 06/12/18     CE Point of Care Auth Received      Oximetry - HIM Scan  06/18/18 OVERNIGHT OXIMETRY    Oximetry - HIM Scan  06/18/18 OVERNIGHT OXIMETRY      Admission Information     Attending Provider Admitting Provider Admission Type Admission Date/Time    Jonathan Dove, DO HelenaJonathan P, DO Urgent 06/11/18  1645    Discharge Date Hospital Service Auth/Cert Status Service Area     Internal Medicine Incomplete [facility] SERVICES    Unit Room/Bed Admission Status        66 MED SPEC UNIT 6603/6603-02 Admission (Confirmed)       Admission     Complaint    syncope, a-fib, Fall, Falls      Hospital Account     Name Acct ID Class Status Primary Coverage    Bj Ponce [id number] Inpatient Open MEDICARE - MEDICARE FOR HB SUPPLEMENT            Guarantor Account (for Hospital Account #[id number])     Name Relation to Pt Service Area Active? Acct Type    Bj Ponce Self FCS Yes Personal/Family    Address Phone          [address]   [place], [place] [id number] [phone](H)  [phone](O)              Coverage Information (for Hospital Account #[id number])     1. MEDICARE/MEDICARE FOR HB SUPPLEMENT     F/O Payor/Plan Precert #    MEDICARE/MEDICARE FOR HB  SUPPLEMENT     Subscriber Subscriber #    Bj Ponce [id number]    Address Phone    [employer] CLAIM[employer]  [address]  [place] [address] [phone]          2. MEDICA/MEDICA PRIME SOLUTION     F/O Payor/Plan Precert #    MEDICA/MEDICA PRIME SOLUTION     Subscriber Subscriber #    Bj Ponce CARLY [id number]    Address Phone    [address] [address]  [place] [address] [phone]

## 2018-06-11 NOTE — IP AVS SNAPSHOT
"[place] [address] [facility]: [phone]                                              INTERAGENCY TRANSFER FORM - PHYSICIAN ORDERS   2018                    Hospital Admission Date: 2018  DEMI BAILEY   : 10/6/1929  Sex: Male        Attending Provider: Jonathan Dove DO     Allergies:  No Known Allergies    Infection:  None   Service:  INTERNAL MED    Ht:  1.753 m (5' 9\")   Wt:  55.2 kg (121 lb 9.6 oz)   Admission Wt:  56.2 kg (123 lb 12.8 oz)    BMI:  17.96 kg/m 2   BSA:  1.64 m 2            Patient PCP Information     Provider PCP Type    Azar Zaldivar MD General      ED Clinical Impression     Diagnosis Description Comment Added By Time Added    Permanent atrial fibrillation (H) [I48.2] Permanent atrial fibrillation (H) [I48.2]  Carri Sherman PA-C 2018  8:22 AM    Syncope, unspecified syncope type [R55] Syncope, unspecified syncope type [R55]  Iona Ghotra APRN CNP 2018  2:02 PM    Recurrent falls [R29.6] Recurrent falls [R29.6]  Iona Ghotra APRN CNP 2018  2:03 PM      Hospital Problems as of 2018              Priority Class Noted POA    Fall Medium  2018 Yes    Falls Medium  2018 Yes      Non-Hospital Problems as of 2018              Priority Class Noted    Pseudogout Medium  Unknown    Benign essential hypertension Medium  Unknown    ASCVD (arteriosclerotic cardiovascular disease) Medium  Unknown    CKD (chronic kidney disease) stage 4, GFR 15-29 ml/min (H) Medium  Unknown    PVD (peripheral vascular disease) with claudication (H) Medium  Unknown    Hyperlipidemia LDL goal <100 Medium  2012    Intermittent atrial fibrillation (H) Medium  2016    Cardiomyopathy (H) Medium  2017    CVA (cerebral vascular accident) (H) Medium  2017    Anxiety Medium  2018    Long-term (current) use of anticoagulants [Z79.01] Medium  2018    Protein-calorie malnutrition (H) Medium  2018      Code " Status History     Date Active Date Inactive Code Status Order ID Comments User Context    6/18/2018 11:50 AM  Full Code [id number]  Pa Upton MD Outpatient    6/11/2018  5:28 PM 6/18/2018 11:50 AM Full Code [id number]  Kimberly Vang PA-C Inpatient    3/1/2018  4:19 PM 6/11/2018  5:28 PM Full Code [id number]  Diana Castillo RN Outpatient         Medication Review      START taking        Dose / Directions Comments    melatonin 1 MG Tabs tablet   Used for:  Syncope, unspecified syncope type        Dose:  1 mg   Take 1 tablet (1 mg) by mouth At Bedtime   Refills:  0        multivitamin, therapeutic with minerals Tabs tablet   Used for:  Syncope, unspecified syncope type        Dose:  1 tablet   Start taking on:  6/19/2018   Take 1 tablet by mouth daily   Quantity:  30 each   Refills:  0          CONTINUE these medications which have NOT CHANGED        Dose / Directions Comments    aspirin 81 MG EC tablet   Used for:  Cerebrovascular accident (CVA), unspecified mechanism (H)        Dose:  81 mg   Take 1 tablet (81 mg) by mouth daily   Quantity:  90 tablet   Refills:  3        clotrimazole 1 % cream   Commonly known as:  LOTRIMIN   Used for:  Cerebrovascular accident (CVA), unspecified mechanism (H)        Apply topically 2 times daily Apply to feet and between toes   Refills:  0        clotrimazole-betamethasone cream   Commonly known as:  LOTRISONE        Apply topically 2 times daily   Quantity:  15 g   Refills:  1        LOSARTAN POTASSIUM PO        Dose:  50 mg   Take 50 mg by mouth daily   Refills:  0        metoprolol tartrate 25 MG tablet   Commonly known as:  LOPRESSOR   Used for:  Cerebrovascular accident (CVA), unspecified mechanism (H)        Dose:  50 mg   Take 2 tablets (50 mg) by mouth 2 times daily   Quantity:  120 tablet   Refills:  11        sertraline 50 MG tablet   Commonly known as:  ZOLOFT   Used for:  Anxiety        Dose:  50 mg   Take 1 tablet (50 mg) by mouth daily   Quantity:   90 tablet   Refills:  1        TORSEMIDE PO        Dose:  10 mg   Take 10 mg by mouth daily   Refills:  0                  Further instructions from your care team       Wallet;clothing;glasses;shoes;walker    Mingo took wallet and keys home    Summary of Visit     Reason for your hospital stay       You were hospitalized for falls, and falling asleep recurrently.             After Care     Activity - Up with nursing assistance           Additional Discharge Instructions       Patient is a fall risk given his sleeping spells, and should not be left unattended when standing, walking, or in the bathroom.       Advance Diet as Tolerated       Follow this diet upon discharge: Orders Placed This Encounter      Room Service      Regular Diet Adult       Fall precautions           General info for SNF       Length of Stay Estimate: Short Term Care: Estimated # of Days <30  Condition at Discharge: Improving  Level of care:skilled   Rehabilitation Potential: Good  Admission H&P remains valid and up-to-date: Yes  Recent Chemotherapy: N/A  Use Nursing Home Standing Orders: Yes       Mantoux instructions       Give two-step Mantoux (PPD) Per Facility Policy Yes             Referrals     Occupational Therapy Adult Consult       Evaluate and treat as clinically indicated.    Reason:  Deconditioning       Physical Therapy Adult Consult       Evaluate and treat as clinically indicated.    Reason:  Deconditioning       SLEEP EVALUATION & MANAGEMENT REFERRAL - ADULT -[facility] [phone]  (Age 18 and up)       Please be aware that coverage of these services is subject to the terms and limitations of your health insurance plan.  Call member services at your health plan with any benefit or coverage questions.      Please bring the following to your appointment:    >>   List of current medications   >>   This referral request   >>   Any documents/labs given to you for this referral           Follow-Up with  Cardiac Advanced Practice Provider                 Radiology & Cardiology Orders     Future Labs/Procedures Complete By Expires    Cardiac event monitor  As directed 9/16/2018      Radiology & Cardiology Orders     Cardiac event monitor                 Your next 10 appointments already scheduled     Jun 21, 2018 12:30 PM CDT   Return Discharge with AGUSTÍN Landeros CNP   [facility]    [id number]  [facility] [address]   [phone] OPT 2            Jul 05, 2018  1:30 PM CDT   New Sleep Patient with Vivek oN MD   [facility] ([facility])    [address]  [facility] [address]   [phone]            Jul 09, 2018 11:00 AM CDT   Office Visit with Azar Zaldivar MD   [facility]    [address] [facility] [address]   [phone]           Bring a current list of meds and any records pertaining to this visit. For Physicals, please bring immunization records and any forms needing to be filled out. Please arrive 10 minutes early to complete paperwork.            Jul 23, 2018 11:00 AM CDT   P EP RETURN with Carri Sherman PA-C   [facility])    [address] [address]   [phone] OPT 2            Aug 13, 2018  3:45 PM CDT   New Visit with Emeterio Coleman MD   [facility] ([facility])    [address] [address] [phone] OPT 2              Statement of Approval     Ordered          06/18/18 1419  I have reviewed and agree with all the recommendations and orders detailed in this document.  EFFECTIVE NOW     Approved and electronically signed by:  Pa Upton MD           06/18/18 1430  I have reviewed and agree with all  the recommendations and orders detailed in this document.     Approved and electronically signed by:  Pa Upton MD

## 2018-06-11 NOTE — TELEPHONE ENCOUNTER
Reason for Call: Request for an order or referral:    Order or referral being requested: Admission to Nursing Home for Short Term stay    Date needed: requesting pt be admitted today    Has the patient been seen by the PCP for this problem? YES in ER    Additional comments: Analilia called to report that pt was D/C from [facility] on Sunday and was sent home. She states this is an unsafe D/C and that pt needs to be admitted to nursing facility. They are wondering if Dr Zaldivar would be willing to fill out the admission orders for pt to stay at facility which is connected to his building so they can start Physical Therapy and Occupational Therapy for pt. Analilia states this will be short term stay.  Analilia is faxing over the forms if you are willing to complete.  They need back by end of day as admission was requested to start today.    Phone number Patient can be reached at:  Other phone number:  [place] [phone]     Best Time:  any    Can we leave a detailed message on this number?  YES    Call taken on 6/11/2018 at 1:08 PM by Kathy Rader

## 2018-06-11 NOTE — TELEPHONE ENCOUNTER
Additional Information    Negative: [1] Caller is not with the adult (patient) AND [2] reporting urgent symptoms    Negative: Lab result questions    Negative: Medication questions    Negative: Caller cannot be reached by phone    Negative: Caller has already spoken to PCP or another triager    Negative: RN needs further essential information from caller in order to complete triage    Negative: Requesting regular office appointment    Negative: [1] Caller requesting NON-URGENT health information AND [2] PCP's office is the best resource    Negative: Health Information question, no triage required and triager able to answer question    Negative: General information question, no triage required and triager able to answer question    Negative: Question about upcoming scheduled test, no triage required and triager able to answer question    Negative: [1] Caller is not with the adult (patient) AND [2] probable NON-URGENT symptoms    [1] Follow-up call to recent contact AND [2] information only call, no triage required    Protocols used: INFORMATION ONLY CALL-ADULT-    Continuation of previous note: Dr. Zamora called this nurse back and was told about pt's symptoms of feeling lousy and exhausted and about his recent fall and hitting the back of his head. RN then told this  About pt's request to take 2 Ibuprofen pills. Dr. Zamora said that pt. Should not take any Ibuprofen, due to there being any possible hidden bleed occurring. Dr. Zamora says that pt. Needs to go back to the ER now, due to his symptoms. RN then called Xuan back and told her this information. Xuan voiced understanding and says that she will tell Bj this information.

## 2018-06-11 NOTE — IP AVS SNAPSHOT
"` `           [place] [address] [facility]: [phone]                                              INTERAGENCY TRANSFER FORM - NURSING   2018                    Hospital Admission Date: 2018  DEMI BAILEY   : 10/6/1929  Sex: Male        Attending Provider: Jonathan Dove DO     Allergies:  No Known Allergies    Infection:  None   Service:  INTERNAL MED    Ht:  1.753 m (5' 9\")   Wt:  55.2 kg (121 lb 9.6 oz)   Admission Wt:  56.2 kg (123 lb 12.8 oz)    BMI:  17.96 kg/m 2   BSA:  1.64 m 2            Patient PCP Information     Provider PCP Type    Azar Zaldivar MD General      Current Code Status     Date Active Code Status Order ID Comments User Context       Prior      Code Status History     Date Active Date Inactive Code Status Order ID Comments User Context    2018 11:50 AM  Full Code [id number]  Pa Upton MD Outpatient    2018  5:28 PM 2018 11:50 AM Full Code [id number]  Kimberly Vang PA-C Inpatient    3/1/2018  4:19 PM 2018  5:28 PM Full Code [id number]  Diana Castillo RN Outpatient      Advance Directives        Scanned docmt in ACP Activity?           Yes, scanned ACP docmt        Hospital Problems as of 2018              Priority Class Noted POA    Fall Medium  2018 Yes    Falls Medium  2018 Yes      Non-Hospital Problems as of 2018              Priority Class Noted    Pseudogout Medium  Unknown    Benign essential hypertension Medium  Unknown    ASCVD (arteriosclerotic cardiovascular disease) Medium  Unknown    CKD (chronic kidney disease) stage 4, GFR 15-29 ml/min (H) Medium  Unknown    PVD (peripheral vascular disease) with claudication (H) Medium  Unknown    Hyperlipidemia LDL goal <100 Medium  2012    Intermittent atrial fibrillation (H) Medium  2016    Cardiomyopathy (H) Medium  2017    CVA (cerebral vascular accident) (H) Medium  2017    Anxiety Medium  2018    Long-term (current) use of " "anticoagulants [Z79.01] Medium  1/16/2018    Protein-calorie malnutrition (H) Medium  6/5/2018      Immunizations     Name Date      Influenza (High Dose) 3 valent vaccine 10/01/17     Influenza (High Dose) 3 valent vaccine 12/06/16     Influenza (High Dose) 3 valent vaccine 10/09/15     Pneumo Conj 13-V (2010&after) 12/06/16     Pneumococcal 23 valent 01/25/07     TD (ADULT, 7+) 04/17/05     TDAP Vaccine (Adacel) 05/28/13          END      ASSESSMENT     Discharge Profile Flowsheet     EXPECTED DISCHARGE     Inspection of bony prominences  Full 06/18/18 1109    Expected Discharge Date  06/18/18 (tcu) 06/16/18 1529   Inspection under devices  Full 06/18/18 0638    DISCHARGE NEEDS ASSESSMENT     Skin WDL  WDL 06/18/18 1109    Equipment Currently Used at Home  walker, rolling (4WW) 06/18/18 1054   Skin Color/Characteristics  pale;bruised (ecchymotic) 06/18/18 1109    Transportation Available  family or friend will provide (Pt's girlfriend provides transportation. ) 06/12/18 1029   Skin Temperature  warm 06/18/18 1109    GASTROINTESTINAL (ADULT,PEDIATRIC,OB)     Skin Moisture  dry 06/18/18 1109    GI WDL  WDL 06/18/18 1109   Skin Elasticity  slow return to original state 06/18/18 1109    Last Bowel Movement  06/16/18 06/16/18 1544   Skin Integrity  bruise(s) 06/18/18 1109    Passing flatus  yes 06/17/18 1700   SAFETY      COMMUNICATION ASSESSMENT     Safety WDL  WDL 06/18/18 1255    Patient's communication style  spoken language (English or Bilingual) 06/03/18 1233   Safety Factors  call light in reach 06/18/18 1255    SKIN     All Alarms  alarm(s) activated and audible 06/18/18 1255                 Assessment WDL (Within Defined Limits) Definitions           Safety WDL     Effective: 09/28/15    Row Information: <b>WDL Definition:</b> Bed in low position, wheels locked; call light in reach; upper side rails up x 2; ID band on<br> <font color=\"gray\"><i>Item=AS safety wdl>>List=AS safety wdl>>Version=F14</i></font>    " "  Skin WDL     Effective: 09/28/15    Row Information: <b>WDL Definition:</b> Warm; dry; intact; elastic; without discoloration; pressure points without redness<br> <font color=\"gray\"><i>Item=AS skin wdl>>List=AS skin wdl>>Version=F14</i></font>      Vitals     Vital Signs Flowsheet     VITAL SIGNS     Weight  55.2 kg (121 lb 9.6 oz) 06/18/18 0417    Temp  97.2  F (36.2  C) 06/18/18 0727   Weight Method  Standing scale 06/18/18 0417    Temp src  Oral 06/18/18 0727   POSITIONING      Resp  16 06/18/18 0727   Body Position  supine, head elevated 06/17/18 2150    Pulse  64 06/18/18 0727   Head of Bed (HOB)  HOB at 30-45 degrees 06/17/18 1700    Heart Rate  63 06/17/18 2134   Positioning/Transfer Devices  pillows 06/17/18 2150    Pulse/Heart Rate Source  Monitor 06/18/18 0727   Chair  Upright in chair 06/18/18 1109    BP  147/76 06/18/18 0727   DAILY CARE      BP Location  Right arm 06/18/18 0727   Activity Management  ambulated to bathroom;up in chair 06/18/18 1109    OXYGEN THERAPY     Activity Assistance Provided  assistance, 1 person 06/18/18 1109    SpO2  93 % 06/18/18 0727   Assistive Device Utilized  gait belt;walker 06/18/18 1109    O2 Device  None (Room air) 06/18/18 0727   Additional Documentation  Activity Device Assistance (Row) 06/11/18 1729    Oxygen Delivery  1.5 LPM 06/17/18 0738   ECG      PAIN/COMFORT     ECG Rhythm  Atrial flutter 06/13/18 2225    Patient Currently in Pain  denies 06/18/18 1109   Equipment  electrodes changed;telemetry batteries changed 06/13/18 0852    Preferred Pain Scale  number (Numeric Rating Pain Scale) 06/18/18 1109   SAMEERA COMA SCALE      Patient's Stated Pain Goal  No pain 06/18/18 1109   Best Eye Response  4-->(E4) spontaneous 06/18/18 1109    0-10 Pain Scale  0 06/18/18 1109   Best Motor Response  6-->(M6) obeys commands 06/18/18 1109    Response to Interventions  Absence of nonverbal indicators of pain 06/18/18 0312   Best Verbal Response  5-->(V5) oriented 06/18/18 1109 " "   HEIGHT AND WEIGHT     [place] Coma Scale Score  15 06/18/18 1109    Height  1.753 m (5' 9\") 06/11/18 1519                 Patient Lines/Drains/Airways Status    Active LINES/DRAINS/AIRWAYS     Name: Placement date: Placement time: Site: Days: Last dressing change:    Peripheral IV 06/16/18 Left Lower forearm 06/16/18   1800   Lower forearm   1     Wound 06/12/18 Scalp Skin tear 06/12/18   1049   Scalp   6     Wound 06/12/18 Right Arm Abrasion(s) 06/12/18   1050   Arm   6     Wound 06/12/18 Right Knee Abrasion(s) 06/12/18   1051   Knee   6     Wound 06/12/18 Coccyx Other (comment) 06/12/18   1051   Coccyx   6     Wound 06/12/18 Back Abrasion(s) 06/12/18   1056   Back   6     Wound 06/16/18 Right;Lateral Wrist Skin tear 06/16/18   0900   Wrist   2             Patient Lines/Drains/Airways Status    Active PICC/CVC     None            Intake/Output Detail Report     Date Intake   Output Net    Shift P.O. IV Piggyback Total Urine Total       Noc 06/16/18 2300 - 06/17/18 0659 -- -- -- -- -- 0    Day 06/17/18 0700 - 06/17/18 1459 540 -- 540 -- -- 540    Cleopatra 06/17/18 1500 - 06/17/18 2259 340 -- 340 350 350 -10    Noc 06/17/18 2300 - 06/18/18 0659 -- -- -- 200 200 -200    Day 06/18/18 0700 - 06/18/18 1459 480 -- 480 850 850 -370      Last Void/BM       Most Recent Value    Urine Occurrence 1 at 06/18/2018 1157    Stool Occurrence 1 at 06/18/2018 1157      Case Management/Discharge Planning     Case Management/Discharge Planning Flowsheet     LIVING ENVIRONMENT     FINAL RESOURCES      Lives With  alone 06/18/18 1054   Equipment Currently Used at Home  walker, rolling (4WW) 06/18/18 1054    Living Arrangements  apartment 06/18/18 1054   ABUSE RISK SCREEN      COPING/STRESS     QUESTION TO PATIENT:  Has a member of your family or a partner(now or in the past) intimidated, hurt, manipulated, or controlled you in any way?  no 06/11/18 1938    Major Change/Loss/Stressor  hospitalization 06/13/18 0142   QUESTION TO PATIENT: Do " you feel safe going back to the place where you are living?  yes 06/11/18 1938    EXPECTED DISCHARGE     OBSERVATION: Is there reason to believe there has been maltreatment of a vulnerable adult (ie. Physical/Sexual/Emotional abuse, self neglect, lack of adequate food, shelter, medical care, or financial exploitation)?  no 06/11/18 1938    Expected Discharge Date  06/18/18 (u) 06/16/18 1529   OTHER      DISCHARGE PLANNING     Are you depressed or being treated for depression?  Yes 06/11/18 1938    Transportation Available  family or friend will provide (Pt's girlfriend provides transportation. ) 06/12/18 1021

## 2018-06-11 NOTE — IP AVS SNAPSHOT
[place] [address] [facility]    [address] [place] [address]    Phone:  [phone]                                       After Visit Summary   6/11/2018    Bj Ponce    MRN: [id number]           After Visit Summary Signature Page     I have received my discharge instructions, and my questions have been answered. I have discussed any challenges I see with this plan with the nurse or doctor.    ..........................................................................................................................................  Patient/Patient Representative Signature      ..........................................................................................................................................  Patient Representative Print Name and Relationship to Patient    ..................................................               ................................................  Date                                            Time    ..........................................................................................................................................  Reviewed by Signature/Title    ...................................................              ..............................................  Date                                                            Time

## 2018-06-11 NOTE — PROGRESS NOTES
Notified PA of new A-Flutter on tele and requested 12 lead EKG.  12 leak reports A-Flutter with variable AV Block.  PA notified.

## 2018-06-11 NOTE — MR AVS SNAPSHOT
After Visit Summary   6/11/2018    Bj Ponce    MRN: [id number]           Patient Information     Date Of Birth          10/6/1929        Visit Information        Provider Department      6/11/2018 3:00 PM Azar Zaldivar MD [facility]        Today's Diagnoses     Syncope, unspecified syncope type    -  1    CKD (chronic kidney disease) stage 4, GFR 15-29 ml/min (H)        Intermittent atrial fibrillation (H)        Dilated cardiomyopathy (H)           Follow-ups after your visit        Your next 10 appointments already scheduled     Jul 09, 2018 11:00 AM CDT   Office Visit with Azar Zaldivar MD   [facility] ([facility])    [address] [address] [phone]           Bring a current list of meds and any records pertaining to this visit. For Physicals, please bring immunization records and any forms needing to be filled out. Please arrive 10 minutes early to complete paperwork.              Who to contact     If you have questions or need follow up information about today's clinic visit or your schedule please contact [facility] directly at [phone].  Normal or non-critical lab and imaging results will be communicated to you by MyChart, letter or phone within 4 business days after the clinic has received the results. If you do not hear from us within 7 days, please contact the clinic through [employer] or phone. If you have a critical or abnormal lab result, we will notify you by phone as soon as possible.  Submit refill requests through [employer]andeo or call your pharmacy and they will forward the refill request to us. Please allow 3 business days for your refill to be completed.          Additional Information About Your Visit        [employer] Information     [employer]andeo lets you send messages to your doctor, view your test results, renew your prescriptions, schedule appointments and more. To sign up, go to  "[email or web address] . Click on \"Log in\" on the left side of the screen, which will take you to the Welcome page. Then click on \"Sign up Now\" on the right side of the page.     You will be asked to enter the access code listed below, as well as some personal information. Please follow the directions to create your username and password.     Your access code is: [email or web address]  Expires: 2018  1:08 PM     Your access code will  in 90 days. If you need help or a new code, please call your [place] clinic or [phone].        Care EveryWhere ID     This is your Care EveryWhere ID. This could be used by other organizations to access your [place] medical records  [id number]        Your Vitals Were     Pulse Temperature Height Pulse Oximetry          81 97.6  F (36.4  C) (Oral) 5' 9\" (1.753 m) 100%         Blood Pressure from Last 3 Encounters:   18 160/86   18 126/65   18 146/86    Weight from Last 3 Encounters:   18 128 lb (58.1 kg)   18 127 lb (57.6 kg)   18 127 lb 14.4 oz (58 kg)              Today, you had the following     No orders found for display       Primary Care Provider Office Phone # Fax #    Azar Zaldivar [phone] [address]  [place] [address]        Equal Access to Services     [facility] AH: Hadii aad ku hadasho Soomaali, waaxda luqadaha, qaybta kaalmada adeegyada, rc skelton . So [facility] [phone].    ATENCIÓN: Si habla español, tiene a clifford disposición servicios gratuitos de asistencia lingüística. Llame al [phone].    We comply with applicable federal civil rights laws and [place] laws. We do not discriminate on the basis of race, color, national origin, age, disability, sex, sexual orientation, or gender identity.            Thank you!     Thank you for choosing [facility]  for your care. Our goal is always to provide you with excellent care. Hearing back " from our patients is one way we can continue to improve our services. Please take a few minutes to complete the written survey that you may receive in the mail after your visit with us. Thank you!             Your Updated Medication List - Protect others around you: Learn how to safely use, store and throw away your medicines at www.disposemymeds.org.          This list is accurate as of 6/11/18  4:01 PM.  Always use your most recent med list.                   Brand Name Dispense Instructions for use Diagnosis    aspirin 81 MG EC tablet     90 tablet    Take 1 tablet (81 mg) by mouth daily    Cerebrovascular accident (CVA), unspecified mechanism (H)       clotrimazole 1 % cream    LOTRIMIN     Apply topically 2 times daily Apply to feet and between toes    Cerebrovascular accident (CVA), unspecified mechanism (H)       clotrimazole-betamethasone cream    LOTRISONE    15 g    Apply topically 2 times daily        losartan 100 MG tablet    COZAAR    90 tablet    Take 1 tablet (100 mg) by mouth daily    Cerebrovascular accident (CVA), unspecified mechanism (H)       metoprolol tartrate 25 MG tablet    LOPRESSOR    120 tablet    Take 2 tablets (50 mg) by mouth 2 times daily    Cerebrovascular accident (CVA), unspecified mechanism (H)       sertraline 50 MG tablet    ZOLOFT    90 tablet    Take 1 tablet (50 mg) by mouth daily    Anxiety       torsemide 20 MG tablet    DEMADEX    90 tablet    Take 1 tablet (20 mg) by mouth daily    Benign essential hypertension       warfarin 2.5 MG tablet    COUMADIN    180 tablet    TAKE 2-3 TABLETS DAILY (5-7.5 MG) BY MOUTH DAILY OR AS INSTRUCTED BY INR CLINIC    Cerebrovascular accident (CVA), unspecified mechanism (H)

## 2018-06-11 NOTE — TELEPHONE ENCOUNTER
Dr Zaldivar will address at [facility] with pt today. He will complete admission form and send back to [facility].  Analilia notified at her personal number [phone] and LM

## 2018-06-11 NOTE — PHARMACY-ADMISSION MEDICATION HISTORY
Admission medication history interview status for the 6/11/2018  admission is complete. See EPIC admission navigator for prior to admission medications     Medication history source reliability:Good    Actions taken by pharmacist (provider contacted, etc):None     Additional medication history information not noted on PTA med list : Warfarin discont, Losartan dose adj to 50mg and Torsemide adj to 10mg per Dr. Zaldivar     Medication reconciliation/reorder completed by provider prior to medication history? Yes    Time spent in this activity: 15 min    Prior to Admission medications    Medication Sig Last Dose Taking? Auth Provider   aspirin EC 81 MG EC tablet Take 1 tablet (81 mg) by mouth daily 6/10/2018 at Unknown time Yes Azar Zaldivar MD   clotrimazole (LOTRIMIN) 1 % cream Apply topically 2 times daily Apply to feet and between toes 6/10/2018 at Unknown time Yes Reported, Patient   clotrimazole-betamethasone (LOTRISONE) cream Apply topically 2 times daily 6/10/2018 at Unknown time Yes Mandi Calles APRN CNP   LOSARTAN POTASSIUM PO Take 50 mg by mouth daily 6/10/2018 at Unknown time Yes Unknown, Entered By History   sertraline (ZOLOFT) 50 MG tablet Take 1 tablet (50 mg) by mouth daily 6/10/2018 at Unknown time Yes Azar Zaldivar MD   TORSEMIDE PO Take 10 mg by mouth daily 6/10/2018 at Unknown time Yes Unknown, Entered By History   metoprolol tartrate (LOPRESSOR) 25 MG tablet Take 2 tablets (50 mg) by mouth 2 times daily   Azar Zaldivar MD

## 2018-06-11 NOTE — IP AVS SNAPSHOT
` `     [place] [address] [facility]: [phone]            Medication Administration Report for Bj Ponce as of 06/18/18 4210   Legend:    Given Hold Not Given Due Canceled Entry Other Actions    Time Time (Time) Time  Time-Action       Inactive    Active    Linked        Medications 06/12/18 06/13/18 06/14/18 06/15/18 06/16/18 06/17/18 06/18/18    acetaminophen (TYLENOL) Suppository 650 mg  Dose: 650 mg  Freq: EVERY 4 HOURS PRN Route: RE  PRN Reason: mild pain  Start: 06/11/18 1649   Admin Instructions: Alternate ibuprofen (if ordered) with acetaminophen.  Maximum acetaminophen dose from all sources = 75 mg/kg/day not to exceed 4 grams/day.    Admin. Amount: 1 suppository (1 × 650 mg suppository)  Dispense Loc: [facility]               acetaminophen (TYLENOL) tablet 650 mg  Dose: 650 mg  Freq: EVERY 4 HOURS PRN Route: PO  PRN Reason: mild pain  Start: 06/11/18 1649   Admin Instructions: Alternate ibuprofen (if ordered) with acetaminophen.  Maximum acetaminophen dose from all sources = 75 mg/kg/day not to exceed 4 grams/day.    Admin. Amount: 2 tablet (2 × 325 mg tablet)  Last Admin: 06/18/18 0155  Dispense Loc: [facility]           0155 (650 mg)-Given           aspirin EC tablet 81 mg  Dose: 81 mg  Freq: DAILY Route: PO  Start: 06/13/18 1700   Admin Instructions: DO NOT CRUSH.    Admin. Amount: 1 tablet (1 × 81 mg tablet)  Last Admin: 06/18/18 0831  Dispense Loc: [facility]      1729 (81 mg)-Given        0938 (81 mg)-Given        0931 (81 mg)-Given        1021 (81 mg)-Given        0840 (81 mg)-Given        0831 (81 mg)-Given           losartan (COZAAR) tablet 50 mg  Dose: 50 mg  Freq: DAILY Route: PO  Start: 06/11/18 1700   Admin Instructions: HOld for SBP < 110    Admin. Amount: 1 tablet (1 × 50 mg tablet)  Last Admin: 06/18/18 0830  Dispense Loc: [facility]     0838 (50 mg)-Given        1058 (50 mg)-Given        0938 (50 mg)-Given        0931 (50 mg)-Given        1020 (50 mg)-Given         0841 (50 mg)-Given        0830 (50 mg)-Given           melatonin tablet 1 mg  Dose: 1 mg  Freq: AT BEDTIME PRN Route: PO  PRN Reason: sleep  Start: 06/11/18 1649   Admin Instructions: Do not give unless at least 6 hours of uninterrupted sleep is expected.    Admin. Amount: 1 tablet (1 × 1 mg tablet)  Last Admin: 06/17/18 2336  Dispense Loc: [facility]      2214 (1 mg)-Given         2253 (1 mg)-Given         2336 (1 mg)-Given            metoprolol tartrate (LOPRESSOR) tablet 50 mg  Dose: 50 mg  Freq: 2 TIMES DAILY Route: PO  Start: 06/11/18 2100   Admin Instructions: Hold 2 doses prior to stress test    Admin. Amount: 1 tablet (1 × 50 mg tablet)  Last Admin: 06/18/18 0831  Dispense Loc: [facility]     0838 (50 mg)-Given       2100-Hold [C]        1058 (50 mg)-Given       2214 (50 mg)-Given        0938 (50 mg)-Given       2133 (50 mg)-Given        0931 (50 mg)-Given       2058 (50 mg)-Given        1021 (50 mg)-Given       2107 (50 mg)-Given        0841 (50 mg)-Given       2134 (50 mg)-Given        0831 (50 mg)-Given       [ ] 2100           miconazole (MICATIN; MICRO GUARD) 2 % powder  Freq: EVERY 1 HOUR PRN Route: Top  PRN Reason: other  PRN Comment: topical candidiasis  Start: 06/12/18 1523   Admin Instructions: Apply to affected area.      Last Admin: 06/13/18 2215  Dispense Loc:  Main Pharmacy      2215 ( )-Given                multivitamin, therapeutic with minerals (THERA-VIT-M) tablet 1 tablet  Dose: 1 tablet  Freq: DAILY Route: PO  Start: 06/12/18 1515   Admin. Amount: 1 tablet  Last Admin: 06/18/18 0831  Dispense Loc: [facility]     1832 (1 tablet)-Given        1058 (1 tablet)-Given        0938 (1 tablet)-Given        0931 (1 tablet)-Given        1021 (1 tablet)-Given        0840 (1 tablet)-Given        0831 (1 tablet)-Given           naloxone (NARCAN) injection 0.1-0.4 mg  Dose: 0.1-0.4 mg  Freq: EVERY 2 MIN PRN Route: IV  PRN Reason: opioid reversal  Start: 06/11/18 2079   Admin Instructions: For  respiratory rate LESS than or EQUAL to 8.  Partial reversal dose:  0.1 mg titrated q 2 minutes for Analgesia Side Effects Monitoring Sedation Level of 3 (frequently drowsy, arousable, drifts to sleep during conversation).Full reversal dose:  0.4 mg bolus for Analgesia Side Effects Monitoring Sedation Level of 4 (somnolent, minimal or no response to stimulation).  For ordered doses up to 2mg give IVP. Give each 0.4mg over 15 seconds in emergency situations. For non-emergent situations further dilute in 9mL of NS to facilitate titration of response.    Admin. Amount: 0.1-0.4 mg = 0.25-1 mL Conc: 0.4 mg/mL  Dispense Loc:  [facility]  Volume: 1 mL               ondansetron (ZOFRAN-ODT) ODT tab 4 mg  Dose: 4 mg  Freq: EVERY 6 HOURS PRN Route: PO  PRN Reasons: nausea,vomiting  Start: 06/11/18 1649   Admin Instructions: This is Step 1 of nausea and vomiting management.  If nausea not resolved in 15 minutes, go to Step 2 prochlorperazine (COMPAZINE). Do not push through foil backing. Peel back foil and gently remove. Place on tongue immediately. Administration with liquid unnecessary  With dry hands, peel back foil backing and gently remove tablet; do not push oral disintegrating tablet through foil backing; administer immediately on tongue and oral disintegrating tablet dissolves in seconds; then swallow with saliva; liquid not required.    Admin. Amount: 1 tablet (1 × 4 mg tablet)  Dispense Loc: [facility]              Or  ondansetron (ZOFRAN) injection 4 mg  Dose: 4 mg  Freq: EVERY 6 HOURS PRN Route: IV  PRN Reasons: nausea,vomiting  Start: 06/11/18 1649   Admin Instructions: This is Step 1 of nausea and vomiting management.  If nausea not resolved in 15 minutes, go to Step 2 prochlorperazine (COMPAZINE).  Irritant. For ordered doses up to 4 mg, give IV Push undiluted over 2-5 minutes.    Admin. Amount: 4 mg = 2 mL Conc: 4 mg/2 mL  Dispense Loc: [facility]  Infused Over: 2-5 Minutes  Volume: 2 mL               sertraline  (ZOLOFT) tablet 50 mg  Dose: 50 mg  Freq: DAILY Route: PO  Start: 06/11/18 1700   Admin. Amount: 1 tablet (1 × 50 mg tablet)  Last Admin: 06/18/18 0831  Dispense Loc:  [facility]     0838 (50 mg)-Given        1058 (50 mg)-Given        0938 (50 mg)-Given        0931 (50 mg)-Given        1021 (50 mg)-Given        0840 (50 mg)-Given        0831 (50 mg)-Given           sodium chloride (PF) 0.9% PF flush 10 mL  Dose: 10 mL  Freq: EVERY 10 MIN PRN Route: IV  PRN Reason: other  PRN Comment: for peripheral IV flush post IV meds  Start: 06/13/18 0747   Admin. Amount: 10 mL  Dispense Loc: [facility]  Volume: 10 mL  POC: Cardiac Intra-procedure               sodium chloride (PF) 0.9% PF flush 3 mL  Dose: 3 mL  Freq: EVERY 8 HOURS Route: IK  Start: 06/11/18 1845   Admin Instructions: And Q1H PRN, to lock peripheral IV dormant line.    Admin. Amount: 3 mL  Last Admin: 06/18/18 0835  Dispense Loc: [facility] Floor Stock  Volume: 3 mL   Current Line: Peripheral IV 06/16/18 Left Lower forearm    0207 (3 mL)-Given       1258 (3 mL)-Given       (2041)-Not Given       2048 (3 mL)-Given        0045 (3 mL)-Given              1058 (3 mL)-Given       1729 (3 mL)-Given               0938 (3 mL)-Given       1722 (3 mL)-Given        0011 (3 mL)-Given       0931 (3 mL)-Given       1703 (3 mL)-Given        0236 (3 mL)-Given       1024 (3 mL)-Given       1731 (3 mL)-Given        0220 (3 mL)-Given       0841 (3 mL)-Given       1820 (3 mL)-Given        0417 (3 mL)-Given       0835 (3 mL)-Given       [ ] 1700           sodium chloride (PF) 0.9% PF flush 3 mL  Dose: 3 mL  Freq: EVERY 1 HOUR PRN Route: IK  PRN Reason: line flush  Start: 06/11/18 1840   Admin Instructions: for peripheral IV flush post IV meds    Admin. Amount: 3 mL  Dispense Loc: [facility] Floor Stock  Volume: 3 mL               sodium chloride (PF) 0.9% PF flush 5-10 mL  Dose: 5-10 mL  Freq: EVERY 1 MIN PRN Route: IV  PRN Reason: line flush  Start: 06/13/18 0747   Admin Instructions: 2  flushes through saline lock.   First flush given immediately   Usual flush dose is 10 mL over 15 seconds after bolus injection of Lexiscan.  Second flush given immediately after isotope injection.  Usual flush dose is 5 mL.   FOR TOTAL OF 2 doses.    Admin. Amount: 5-10 mL  Dispense Loc: [facility] Floor Stock  Administrations Remainin  Volume: 10 mL  POC: Cardiac Intra-procedure               sodium chloride bacteriostatic 0.9 % flush 0-1 mL  Dose: 0-1 mL  Freq: ONCE PRN Route: ID  PRN Reason: other  PRN Comment: for preparation of the IV insertion  Start: 18 0747   Admin Instructions: Give immediately prior to the procedure  This dose is to be given ONLY by provider or Nuclear Medicine procedure staff ONLY as verbally requested by the provider    Admin. Amount: 0-1 mL  Dispense Loc: [facility] Floor Stock  Administrations Remainin  Volume: 30 mL  POC: Cardiac Intra-procedure               torsemide (DEMADEX) tablet 10 mg  Dose: 10 mg  Freq: DAILY Route: PO  Start: 18 0900   Admin. Amount: 1 tablet (1 × 10 mg tablet)  Last Admin: 18 0830  Dispense Loc:  [facility]       0938 (10 mg)-Given        0931 (10 mg)-Given        1021 (10 mg)-Given        0840 (10 mg)-Given        0830 (10 mg)-Given        Medications 06/12/18 06/13/18 06/14/18 06/15/18 06/16/18 06/17/18 06/18/18

## 2018-06-12 NOTE — PLAN OF CARE
Problem: Patient Care Overview  Goal: Plan of Care/Patient Progress Review  PT: Orders received and evaluation completed. Patient is a 89 y/o male admitted under observation due to recurrent falls. Patient lives alone in an independent living apartment with elevator access. Pt completes functional mobility independently with use of 4WW at baseline.     Discharge Planner PT   Patient plan for discharge: None stated. Pt is aware current PT recommendation is TCU.   Current status: Pt sitting up in chair upon arrival of therapist, noted BP in sittin/53. Sit <> stand with 4WW and CGA, cues provided for safe transfer technique. BP in standin/53. Patient ambulated 200 feet with 4WW and CGA, noted short shuffling gait pattern, pt denies dizziness although reports weakness. Sitting BP after ambulation: 122/62. Patient sitting EOB with RN present at end of session.   Barriers to return to prior living situation: Lives alone, Generalized weakness, Level of assist, Fall risk   Recommendations for discharge: TCU  Rationale for recommendations: Pt will require TCU at time of discharge in order to increase independence and safety with mobility. Patient doesn't have assist available at home and currently recommending assist with all mobility. If patient discharges home recommend 24 hour assist of 1 and Home PT. Will defer further PT to TCU as pt is admitted under observation status and discharge recommendations are established.        Entered by: Marianna Petty 2018 10:35 AM

## 2018-06-12 NOTE — CONSULTS
Inpatient Cardiology Consultation   [facility]  Date of Admission:6/11/2018  Date of consult: 6/12/2018.      RECOMMENDATIONS:  Inpatient cardiology consultation note has been dictated. Dictation number [id number]        Emeterio Coleman MD [facility]  Cardiology              REVIEW OF SYSTEMS:  A comprehensive 10 point review of systems was completed and the pertinent positives are documented in history of present illness.    MEDICATIONS:  Prior to Admission Medications   Prescriptions Last Dose Informant Patient Reported? Taking?   LOSARTAN POTASSIUM PO 6/10/2018 at Unknown time Spouse/Significant Other Yes Yes   Sig: Take 50 mg by mouth daily   TORSEMIDE PO 6/10/2018 at Unknown time Spouse/Significant Other Yes Yes   Sig: Take 10 mg by mouth daily   aspirin EC 81 MG EC tablet 6/10/2018 at Unknown time Spouse/Significant Other No Yes   Sig: Take 1 tablet (81 mg) by mouth daily   clotrimazole (LOTRIMIN) 1 % cream 6/10/2018 at Unknown time Spouse/Significant Other Yes Yes   Sig: Apply topically 2 times daily Apply to feet and between toes   clotrimazole-betamethasone (LOTRISONE) cream 6/10/2018 at Unknown time Spouse/Significant Other No Yes   Sig: Apply topically 2 times daily   metoprolol tartrate (LOPRESSOR) 25 MG tablet  Spouse/Significant Other No No   Sig: Take 2 tablets (50 mg) by mouth 2 times daily   sertraline (ZOLOFT) 50 MG tablet 6/10/2018 at Unknown time Spouse/Significant Other No Yes   Sig: Take 1 tablet (50 mg) by mouth daily      Facility-Administered Medications: None       ALLERGIES:  No Known Allergies    PAST MEDICAL HISTORY:  Past Medical History:   Diagnosis Date     afib 2004, 2010, 2012    post op and then again 2010, 2012     Anxiety 01/2018    added zoloft     ASCVD (arteriosclerotic cardiovascular disease) 2004    angio for cp and 2 vessel dz, cabg done 2004, post op afib     Cardiomyopathy (H) 11/2017    found on echo done during cvi in [facility]; echo 1/18 here with ef 38%,  global hypokinesis     CKD (chronic kidney disease) stage 4, GFR 15-29 ml/min (H)      CVA (cerebral vascular accident) (H) 11/25/2017    L MCA embolism, received TPA. No residual deficits, add coumadin     Genital herpes      HTN (hypertension)      Hypercholesteremia      Pseudogout      PVD (peripheral vascular disease) with claudication (H)        PAST SURGICAL HISTORY:  Past Surgical History:   Procedure Laterality Date     CATARACT IOL, RT/LT  2006     CORONARY ARTERY BYPASS  2004     HYDROCELECTOMY SCROTAL  1997     TONSILLECTOMY       TURP  1997       SOCIAL HISTORY:   Bj Ponce  reports that he quit smoking about 64 years ago. He has never used smokeless tobacco. He reports that he does not drink alcohol or use illicit drugs.    FAMILY HISTORY:  Family History   Problem Relation Age of Onset     Breast Cancer Sister               Opalaimee Aurea Coleman MD

## 2018-06-12 NOTE — PROGRESS NOTES
[facility] Nurse Inpatient Wound Assessment     Initial Assessment of wound(s) on pt's:   Scalp, back, right elbow, right knee, coccyx        Data:   Patient History:      per MD note(s): Bj Ponce is an 88-year-old male with past medical history of coronary artery disease, atrial fibrillation, CKD stage IV, cardiomyopathy, anxiety and 1 month of increasing falls versus syncope, who was admitted directly from clinic for evaluation of recurrent falls.      David Assessment and sub scores:   David Score  Av  Min: 13  Max: 19    Positioning: pillows    Mattress:  Standard , Atmos Air mattress    Moisture Management:  Diaper    Catheter secured? Not applicable    Current Diet / Nutrition:           Active Diet Order      Regular Diet Adult    Labs:   Recent Labs   Lab Test  18   1745   18   1449   ALBUMIN   --    --   2.4*   HGB  10.7*   < >  11.1*   INR  1.20*   < >  1.70*   WBC  5.7   < >  6.8    < > = values in this interval not displayed.       Wound Assessment (location):  Scalp, back, right elbow, right knee, coccyx    Wound History: All wounds related to falls with the exception of groin/gluteal cleft rash  Scalp has two superficial, dry abrasions approximately 3 cm in diameter that are currently left open to air.    Back has two dry linear abrasions approximately 5 and 8 cm in length that are currently left open to air.  Right elbow skin tear approximately 1 cm in diameter and covered with Mepilex.  Right knee has a dry 3 x 2.5 cm abrasion.  There are no open areas on coccyx however, patient has a red, raised rash extending from groin folds to the gluteal cleft.  This appears fungal in nature.  Generally, patient is thin and gerald in appearance with a history of falls.          Intervention:     Patient's chart evaluated.      Wound(s) assessed    Orders written as below    Supplies  Ordered: Geomatt chair cushion    Discussed plan of care with patient and nurse              All patient / family questions answered           Assessment:      Multiple abrasions from falls.  Fungal rash in groin and gluteal cleft.         Plan:     Nursing to notify the Provider(s) and re-consult the WOC Nurse if wound(s) deteriorate(s) or if the wound care plan needs reevaluation.    Plan of care for wound located on Right Elbow every 3 days: cleanse with wound cleanser and pat dry.  Mepilex 4x4 to cover.    Nursing will apply antifungal powder to groin folds and gluteal cleft.    Geomatt chair cushion when up in chair.    WOC Nurse will return: weekly

## 2018-06-12 NOTE — PLAN OF CARE
Problem: Patient Care Overview  Goal: Plan of Care/Patient Progress Review  Outcome: No Change  Admission     Patient arrives to room 603-2 via wheelchair from [facility].  Care plan note: Direct Obs admit from clinic.  A&Ox4.  Ax1 with GB, walker.  Weak.  Tolerates regular diet.  Up in chair for dinner.  VSS on RA.  Denies pain.  Tele: A-flutter, confirmed with 12 lead; PA aware.  Saline locked.  Scattered abrasions from fall-related wounds.  Mepilex to coccyx and both heels for pressure protection; blanchable redness. UA/UC collected, had head CT.  Echo and cardiology consult tomorrow.  PT, SW consults.  Discharge pending.     Inpatient nursing criteria listed below were met:     PCD's Documented: No - Not ordered.  Skin issues/needs documented :Yes - Generalized abrasions/skin tears.  Isolation education started/completed NA  Patient allergies verified with patient: Yes  Fall Prevention: Care plan updated, Education given and documented No - Obs.  Care Plan initiated: No - Obs.  Home medications documented in belongings flowsheet: NA  Patient belongings documented in agencyQs flowsheet: Yes  Reminder note (belongings/ medications) placed in discharge instructions:Yes  Admission profile/ required documentation complete: No - Obs profile completed.

## 2018-06-12 NOTE — PLAN OF CARE
Problem: Patient Care Overview  Goal: Plan of Care/Patient Progress Review  Outcome: No Change  A&OX4, forgetful, occ anxious. Assist of 1 with walker, chair for meals, amb in ryder x2, to BR, turn/repos side to side when in bed. VSS. Denies pain. Aaron diet, fair appetite. Tele NSR. Echo to be performed, cards to see. Denies dizziness. Pt reports falling asleep while sitting in the chair, fell forward, chin touched the rolling tray table and woke him up, witness falling asleep and falling forward while sitting in a chair x1. Mult abrasions/skin tears r/t falls, coccyx blanchable red, shea heels dry, WOC RN saw: anti fungal powder and chair cushion ordered. Left groin hernia present, chronic. Right LE edema present, U/S neg for DVT.

## 2018-06-12 NOTE — PROGRESS NOTES
Care Coordinator has reviewed chart and met with pt concerning Observation and need for MOON document.  Awaiting Cardiology consult.  Pt noted he simply finds that he falls asleep soon after he sits and then ends up going forward falling out of the chair.  He is not sleeping at night.  He noted this has been going on for five to six months.    Discussed recommendations for TCU transition. Pt states he is not able to afford this.  SW will see today as time permits.

## 2018-06-12 NOTE — PLAN OF CARE
Problem: Patient Care Overview  Goal: Plan of Care/Patient Progress Review  Outcome: No Change  3-7p. Pt A/O#3, forgetful, calm/cooperative, VSS on RA, denies pain, up w/1/GB/walker to BR, good po, denies dizziness, c/o sleepiness, Echo done-seen by Cards this shift, Metoprolol on hold #2 doses until NM stress test completed tomorrow, tele-a-fib w/CVR, will monitor.

## 2018-06-12 NOTE — PROGRESS NOTES
06/12/18 1000   Quick Adds   Type of Visit Initial PT Evaluation   Living Environment   Lives With alone   Living Arrangements independent living facility   Home Accessibility bed and bath on same level   Number of Stairs to Enter Home 0   Number of Stairs Within Home 0   Transportation Available family or friend will provide  (Pt's girlfriend provides transportation. )   Self-Care   Usual Activity Tolerance good   Current Activity Tolerance moderate   Regular Exercise yes   Activity/Exercise Type walking   Equipment Currently Used at Home grab bar;walker, rolling   Activity/Exercise/Self-Care Comment Pt owns 4WW.    Functional Level Prior   Ambulation 1-->assistive equipment   Transferring 1-->assistive equipment   Toileting 1-->assistive equipment   Bathing 0-->independent   Dressing 0-->independent   Fall history within last six months yes   Number of times patient has fallen within last six months 9   General Information   Onset of Illness/Injury or Date of Surgery - Date 06/11/18   Referring Physician Kimberly Vang PA-C   Patient/Family Goals Statement None stated.    Pertinent History of Current Problem (include personal factors and/or comorbidities that impact the POC) 87 y/o male admitted under observation status due to recurrent falls. PMH inlcuding CAD s/p CABG x 2, atrial fibrillation, cardiomyopathy, MCA CVA 11/17.    Precautions/Limitations fall precautions   General Observations Pt sitting up in chair upon arrival of therapist.    Cognitive Status Examination   Orientation orientation to person, place and time   Level of Consciousness alert   Follows Commands and Answers Questions 75% of the time   Personal Safety and Judgment at risk behaviors demonstrated   Pain Assessment   Patient Currently in Pain No   Integumentary/Edema   Integumentary/Edema Comments Noted R LE edema compared to L LE.    Posture    Posture Comments Noted forward flexed posture upon sitting and standing at 4WW.    Range  "of Motion (ROM)   ROM Comment B LEs WFL.    Strength   Strength Comments B LEs grossly 3/5 with functional mobility.    Bed Mobility   Bed Mobility Comments NT.    Transfer Skills   Transfer Comments Sit <> stand with 4WW and CGA.    Gait   Gait Comments Pt amb 200' with 4WW and CGA, noted short shuffling gait pattern with forward flexed posture.    Balance   Balance Comments Noted good sitting balance and standing balance at 4WW.    Sensory Examination   Sensory Perception no deficits were identified   Clinical Impression   Criteria for Skilled Therapeutic Intervention evaluation only   Clinical Presentation Stable/Uncomplicated   Clinical Presentation Rationale Based on PMH, current presentation, and social support.    Clinical Decision Making (Complexity) Low complexity   Anticipated Discharge Disposition Transitional Care Facility   Risk & Benefits of therapy have been explained Yes   Patient, Family & other staff in agreement with plan of care Yes   [facility] AM-PAC  \"6 Clicks\" V.2 Basic Mobility Inpatient Short Form   1. Turning from your back to your side while in a flat bed without using bedrails? 4 - None   2. Moving from lying on your back to sitting on the side of a flat bed without using bedrails? 3 - A Little   3. Moving to and from a bed to a chair (including a wheelchair)? 3 - A Little   4. Standing up from a chair using your arms (e.g., wheelchair, or bedside chair)? 3 - A Little   5. To walk in hospital room? 3 - A Little   6. Climbing 3-5 steps with a railing? 2 - A Lot   Basic Mobility Raw Score (Score out of 24.Lower scores equate to lower levels of function) 18   Total Evaluation Time   Total Evaluation Time (Minutes) 25     "

## 2018-06-12 NOTE — CONSULTS
INPATIENT CARDIOLOGY CONSULTATION  [facility].  Date of admission: 6/11/2018.  Date of consult: 6/12/2018.      REFERRAL SOURCE:   Kimberly Vang PA-C, Hospitalist Service.      REASON FOR REFERRAL:     1.  New diagnosis of cardiomyopathy.   2.  Syncope and recurrent falls.      HISTORY OF PRESENT ILLNESS:    Mr. Ponce is an elderly, frail-appearing 88-year-old gentleman with some apparent cognitive impairment on conversing with him.  His significant other, Mingo was present in the room and able to provide corroborative medical history.  The patient himself is a retired .  Mingo is a retired  worker.      Mr. Ponce's medical history is significant for coronary artery disease, status post CABG x 2 in 2004 ( LIMA to the LAD and a vein graft to the ramus intermedius. The vein graft to the ramus is a Y graft off the left internal mammary artery.  He is also known to have a completely occluded posterior descending branch of the right coronary artery that was unable to be bypassed due to a diffusely diseased vessel that was not suitable for bypass).  Pre bypass his LVEF was 55%. Post CABG, he developed atrial fibrillation.  Initially he was on warfarin anticoagulation but this was subsequently discontinued per patient preference.  Patient was originally followed by my cardiologist colleague, Dr. Mike Gatica but subsequently has not had any cardiology follow-up for the last 10 years or so.  Other comorbidities include hyper lipidemia, remote smoker (quit in 1955), peripheral vascular disease, stage IV chronic kidney disease (creatinine 2.1-2.2 with an estimated GFR less than 30), and a recent stroke around Thanksgiving 2017.  He is not known to have diabetes or heart failure.      1.  Recurrent falls and syncope.        The patient is admitted with a history of frequent falls over the last 2 months.  Until Thanksgiving 2017, he was in his usual state of health.  Over the  Thanksgiving holiday, his partner, Mingo, was driving him on a highway in [place] when she suddenly realized that Mr. Ponce had aphasia.  She pulled over to a nearby fire station, 911 was called.  He was taken to a local medical facility there brain imaging confirmed a small acute non-hemorrhagic left parietal ischemic stroke with chronic right christy lacunar infarct and significant small vessel disease.  He was given TPA with prompt resolution of his aphasia.      Echocardiogram at the time showed akinesis in the LAD distribution and an LVEF of 35%-40% with normal right ventricular systolic function, dilated left atrium, moderate tricuspid and mitral regurgitation.  It appears he had moderate carotid artery disease.  From there, he was transferred to a rehab facility in [facility] and subsequently to [place].  He did participate in inpatient rehabilitation, but has had progressive debility, gait instability, imbalance and recurrent falls.  Unfortunately, the patient has cognitive impairment and is not able to provide a detailed description of these falls and nobody has witnessed it.  He states that the falls occur when he is sitting on the toilet seat or eating dinner and the next thing he knows is waking up on the floor.  On 1 episode he sustained a bruise on his forehead.  He has no warning signs whatsoever.  While in the hospital, he had one such episode earlier today, but unfortunately he was not on telemetry.  His resting ECG shows chronic atrial fibrillation.      2.  Cardiomyopathy.        Pre-bypass LVEF was normal at 55% with inferior wall akinesis in the context of a chronically occluded right coronary artery with good collaterals from the left.  Echocardiogram from November 2017 (care everywhere) shows a decline in LVEF to 35-40% with normal right ventricular systolic function (as documented above).  His echocardiogram in our system in February 2018 is similar and there is global hypokinesis.   Patient has no symptoms of heart failure.    3.  Chronic atrial fibrillation and question of anticoagulation.    Atrial fibrillation since 2004.  Asymptomatic.  Previously tried on warfarin 2004 and discontinued per patient preference. The reasons for this include INR variability, increased bruising and patient's daughter who lives in [place] practices homeopathic medicine and did not think warfarin was a good idea at the time.  After his stroke in November 2017, he was appropriately started on warfarin but due to his recurrent falls, his primary care provider understandably discontinued it a couple of months ago. His CHADS-VASc score is very high risk given both his recent ischemic stroke as well as evidence of prior lacunar strokes on his brain imaging.       4.  Cognitive impairment.      Both per my interaction with him as well as his partner, Mingo's description, patient's memory and cognitive function has been failing over the last few months, most noticeably since his stroke.  He  has not had any followup with Neurology or Neuropsychiatry.       5.  Stage IV chronic kidney disease.      His recent creatinine is 2.1 with a BUN of 48 and estimated GFR of 25-30.  The etiology of his CKD is not clear.  He is not diabetic, but does have longstanding hypertension.      MEDICATIONS PRIOR TO ADMISSION:   1. Aspirin 81 mg.    2. Losartan 50 mg daily.    3. Torsemide 10 mg daily.   4. Metoprolol tartrate 50 mg b.i.d.   5. Sertraline 50 mg daily.      Please see my attached note in Epic for a comprehensive review of systems, medications, past medical, surgical, social, family history.      PHYSICAL EXAMINATION:   VITAL SIGNS:  Temperature 97.7 Fahrenheit, blood pressure 122/62 mmHg, pulse 75 per minute and regular, respiratory rate 18 per minute, saturations 94% on room air, weight 56.2 kg (123 pounds), height 1.75 meters (5 feet 9 inches), BMI 18.3 kg/m2.   CONSTITUTIONAL:  Elderly, frail gentleman who appears his  stated age of 88 years.  He has a low BMI.  He has cognitive impairment with his ability to recall events.  Poor muscle mass.  He has a bruise on the top of his head from one of his recent falls.   EYES:  Mild pallor.  No xanthelasma.   ENT:  No cyanosis.   NECK:  No thyromegaly.   RESPIRATORY:  A few bibasilar rales, but no wheeze.   CARDIOVASCULAR:  JVP is normal.  Carotid pulse is irregular, consistent with atrial fibrillation with normal volume.  No carotid bruit.  Apical impulse normal to palpation.  Midline well-healed sternotomy scar.  Heart sounds are irregularly irregular.  No audible murmur, S3 or S4.   ABDOMEN:  Soft and nontender.  No hepatosplenomegaly or masses.  Active bowel sounds.  SKIN:  Bruise on the top of his head as above.  No other bruising.   MUSCULOSKELETAL:  Poor generalized muscle strength consistent with significant physical deconditioning.  Gait not assessed.  No obvious spinal deformities.   NEUROPSYCHIATRIC:  He is oriented to time and place, but has difficulty recalling even familiar people's names and events pertaining to his recent medical history.   EXTREMITIES:  1+ pitting edema.  No clubbing or deformities.      DIAGNOSES:   1.  Recurrent falls and possible syncope.      The concerning features are the lack of any warning symptoms/prodrome and he slumps forward when he is eating dinner or sitting on the toilet seat.  His resting ECG shows chronic atrial fibrillation.  There is no evidence of bradyarrhythmias on his current hospitalization.  He is also on a beta blocker for his chronic A fib.  While telemetry in the hospital is going to be beneficial, I think other testing such as event monitor will not be as helpful because the he does not get warning symptoms. An implantable loop recorder or REVEAL device maybe indicated.  I will involve my Electrophysiology colleagues. Please obtain neurology consultation.    2.  Chronic atrial fibrillation and anticoagulation challenges.       Clearly the patient has a very high CHADS-VASc score and anticoagulation is indicated.  Equally with his recurrent falls, gait instability and frailty, he is at risk of intracerebral and other major bleeds from anticoagulation.  Recommend electrophysiology input for a left atrial appendage occlusion device candidacy. For now, continue on metoprolol tartrate.     3.  Left ventricular systolic cardiomyopathy.    LVEF 35-40% first documented in 2017.  Prior to that we do not have an assessment of LV function since .  Etiology unclear. Diffuse hypokinesis.  He also has 14-year-old coronary grafts and known CAD.  He also has increased wall thickness of his left ventricle, that is suspicious for an infiltrative cardiomyopathy such as amyloid.  Cardiac MRI would be ideal but due to GFR less than 30, gadolinium is contraindicated. Proceed to a pharmacological nuclear stress test.     4.  Cognitive impairment.      Based on his brain imaging from outside institution he has evidence of other chronic, clinically non-apparent strokes, which could be due to a combination of hypertension, carotid artery disease or from un-anticoagulated atrial fibrillation.  I recommend a Neurology consult and neuropsychiatric testing to rule out vascular or other dementia.       5. Thank you for consulting us.  General Cardiology will follow along for management of his cardiomyopathy.  For syncope and anticoagulation issues I will place an Electrophysiology consult.  I spent 60 minutes in the care of this complex patient, greater than 50% of the time was spent in counseling and coordination of care.         LISBET STEINER MD             D: 2018   T: 2018   MT: GABRIELE      Name:     DEMI BAILEY   MRN:      [id number]        Account:       [id number]   :      10/06/1929           Consult Date:  2018      Document: [id number]

## 2018-06-12 NOTE — PROGRESS NOTES
"CLINICAL NUTRITION SERVICES  -  ASSESSMENT NOTE      Recommendations Ordered by Registered Dietitian (RD):   Multivitamin/mineral daily for supplementation      Future/Additional Recommendations:   Will continue to offer nutrition supplements (pt declined today)     Malnutrition:   % Weight Loss:  > 10% in 6 months (severe malnutrition)  % Intake:  <75% for >/= 3 months (non-severe malnutrition)  Subcutaneous Fat Loss:  Orbital region moderate depletion and Upper arm region moderate-severe depletion  Muscle Loss:  Temporal region severe depletion  Fluid Retention:  None noted    Malnutrition Diagnosis: Severe malnutrition  In Context of:  Acute on Chronic illness or disease        REASON FOR ASSESSMENT  Bj Ponce is a 88 year old male seen by Registered Dietitian for Admission Nutrition Risk Screen - unintentional loss of 10 lbs or more in the past 2 months      NUTRITION HISTORY  Information obtained from patient:  - Pt reports that he gets provided 2 meals per day, 5 x per week where he lives. Pt does not do any additional meals or supplements apart from this. He states that he has always had an appetite and \"eats everything in sight.\" Most of his meals, he reports, are \"meat and potatoes\" - \"I am getting more than enough to eat\"  - Pt states that after his most recent cardiac surgery he has continued to lose weight, although he feels that he eats plenty    Per chart: pt has h/o CKD stage 4      CURRENT NUTRITION ORDERS  Diet Order:     Regular     Current Intake/Tolerance:  - Pt states that he did not eat much of his lunch today (meatloaf) because it was not very good  - He is currently not concerned about his weight loss and states: \"being thin is a good thing, don't you agree?\"  - He reports that he does not want to gain any of this weight back (see weight trending below)    Per flow sheets: % of meal consumption over the past day       PHYSICAL FINDINGS  Observed  Muscle Wasting - see " "below  Subcutaneous fat loss - see below  Obtained from Chart/Interdisciplinary Team  None noted    ANTHROPOMETRICS  Height: 5' 9\"  Weight: 123 lbs 12.8 oz (56.2 kg)  Body mass index is 18.28 kg/(m^2).  Weight Status:  Underweight BMI <18.5  IBW: 72.7 kg  % IBW: 77%  Weight History: Pt has lost 26# over the past 6 months (17% body wt)  Wt Readings from Last 10 Encounters:   06/11/18 56.2 kg (123 lb 12.8 oz)   06/05/18 58.1 kg (128 lb)   06/03/18 57.6 kg (127 lb)   05/18/18 58 kg (127 lb 14.4 oz)   03/27/18 55.3 kg (122 lb)   02/19/18 56.9 kg (125 lb 8 oz)   01/25/18 60.3 kg (133 lb)   01/16/18 67.6 kg (149 lb)   12/06/16 64.4 kg (142 lb)   10/09/15 66.7 kg (147 lb)       LABS  Labs reviewed    MEDICATIONS  Medications reviewed      ASSESSED NUTRITION NEEDS PER APPROVED PRACTICE GUIDELINES:    Dosing Weight 56.2 kg  Estimated Energy Needs: 1540-2223 kcals (30-35 Kcal/Kg)  Justification: repletion and underweight  Estimated Protein Needs:  grams protein (1.2-1.8 g pro/Kg)  Justification: Repletion and preservation of lean body mass    MALNUTRITION:  % Weight Loss:  > 10% in 6 months (severe malnutrition)  % Intake:  <75% for >/= 3 months (non-severe malnutrition)  Subcutaneous Fat Loss:  Orbital region moderate depletion and Upper arm region moderate-severe depletion  Muscle Loss:  Temporal region severe depletion  Fluid Retention:  None noted    Malnutrition Diagnosis: Severe malnutrition  In Context of:  Acute on Chronic illness or disease    NUTRITION DIAGNOSIS:  Inadequate oral intake related to reliance on living facility for all meals as evidenced by weight loss of >10% over 6 months with moderate to severe fat and muscle losses      NUTRITION INTERVENTIONS  Recommendations / Nutrition Prescription  Continue regular diet as tolerated, continue room service with assist     Continue to offer nutrition supplements as appropriate     Implementation  Nutrition education: Per Provider order if indicated "   Medical Food Supplement: Discussed available supplements. Pt is not interested and declined nutrition supplements at this time.  Multivitamin/Mineral: Daily for supplementation       Nutrition Goals  Pt to consume >50% of meals BID      MONITORING AND EVALUATION:  Progress towards goals will be monitored and evaluated per protocol and Practice Guidelines              Adilene Diggs, Dietitian

## 2018-06-12 NOTE — PLAN OF CARE
Problem: Patient Care Overview  Goal: Plan of Care/Patient Progress Review  Outcome: No Change  A&O x 4. VSS on room air. IV SL. Mepilex dressings to coccyx, arm and heels CDI. Up with assist of 1. Voided.  Slept well throughout. Will continue to monitor.

## 2018-06-12 NOTE — PROGRESS NOTES
[facility]  Hospitalist Progress Note        Jerald Vasques MD   06/12/2018        Interval History:      No acute issues overnight         Assessment and Plan:        Bj Ponce is an 88-year-old male with past medical history of coronary artery disease, atrial fibrillation, CKD stage IV, cardiomyopathy, anxiety and 1 month of increasing falls versus syncope, who was admitted directly from clinic for evaluation of recurrent falls.     1.  Recurrent falls versus syncope  # Paroxysmal atrial fibrillation   -  He was recently hospitalized at [facility] on observation, just discharged the day prior to admission here.  Laboratory evaluation at that time was unremarkable.  Telemetry had shown atrial fibrillation  - Admitted under observation status.  We will monitor on continuous telemetry  - CT head unremarkable; awaiting echocardiogram and cardiology consultation  - continue on his prior to admission metoprolol and losartan.   - Not on anticoagulation in the setting of recurrent falls; will defer anticoagulation to cardiology  - PT evaluation and  for disposition planning    # History of stroke (11/2017)  - Has no focal neurological deficits but states has lost his strength ever since then which might be contributing to his recurrent falls; he was at [facility] TCU following stroke; might need long-term nursing home placement  - He was started on a statin after his stroke in November; however, this was discontinued due to elevated LFTs    2.  Coronary artery disease with cardiomyopathy  # Right lower extremity edema  - :  As above, had a recent decline in his ejection fraction this fall.  Not currently followed by Cardiology.  Repeat echocardiogram is pending.  He will be continued on his beta blocker and losartan.    - Per patient right lower extremity edema is chronic attributing to his peripheral artery disease; will get ultrasound to rule out DVT    4.  Anxiety:  Continue  prior to admission Zoloft.     # CKD stage 4: Creatinine seems to be stable around a baseline of 1.8---2.2  5.  Deep venous thrombosis prophylaxis:  PCDs.       CODE STATUS:  FULL CODE.       Disposition:     # Pain Assessment:  Current Pain Score 6/12/2018   Patient currently in pain? denies   Pain score (0-10) -   Bj churchill pain level was assessed and he currently denies pain.                         Physical Exam:      Blood pressure 140/73, pulse 75, temperature 97.7  F (36.5  C), temperature source Oral, resp. rate 28, weight 56.2 kg (123 lb 12.8 oz), SpO2 94 %.  Vitals:    06/11/18 1700   Weight: 56.2 kg (123 lb 12.8 oz)     Vital Signs with Ranges  Temp:  [97.6  F (36.4  C)-97.7  F (36.5  C)] 97.7  F (36.5  C)  Pulse:  [69-87] 75  Resp:  [16-28] 28  BP: (132-160)/(59-86) 140/73  SpO2:  [94 %-100 %] 94 %  I/O's Last 24 hours  I/O last 3 completed shifts:  In: 240 [P.O.:240]  Out: 725 [Urine:725]    Constitutional: Alert, awake and orienetd X 3; looks frail; sitting in chair in no apparent distress   HEENT: Pupils equal and reactive to light and accomodation, EOMI intact; neck supple no raised JVD or rigidity    Oral cavity: Moist mucosa   Cardiovascular: Normal s1 s2, irregularly irregular rate and rhythm, no murmur   Lungs: B/l clear to auscultation, no wheezes or crepitations   Abdomen: Soft, nt, nd, no guarding, rigidity or rebound; BS +   LE :  asymmetric right lower extremity swelling compared to left    Musculoskeletal: Power 5/5 in all extremities   Neuro: No focal neurological deficits noted, CN II to XII grossly intact   Psychiatry: normal mood and affect                Medications:          losartan  50 mg Oral Daily     metoprolol tartrate  50 mg Oral BID     sertraline  50 mg Oral Daily     sodium chloride (PF)  3 mL Intracatheter Q8H     PRN Meds: acetaminophen, acetaminophen, melatonin, naloxone, ondansetron **OR** ondansetron, sodium chloride (PF)         Data:      All new lab and imaging data  was reviewed.   Recent Labs   Lab Test  06/11/18   1745  06/03/18   1230 05/18/18 01/25/18   1449   WBC  5.7  6.1   --    --   6.8   HGB  10.7*  11.7*   --    --   11.1*   MCV  94  94   --    --   97   PLT  219  249   --    --   256   INR  1.20*  1.49*  2.8*   < >  1.70*    < > = values in this interval not displayed.      Recent Labs   Lab Test  06/11/18   1745  06/03/18   1230  02/19/18   1210   NA  142  142  138   POTASSIUM  4.3  4.6  4.7   CHLORIDE  107  109  103   CO2  26  28  28   BUN  48*  49*  64*   CR  2.12*  1.95*  2.09*   ANIONGAP  9  5  7   GARETT  9.3  9.6  9.2   GLC  94  96  119*     No lab results found.    Invalid input(s): TROP, TROPONINIES

## 2018-06-13 PROBLEM — R29.6 FALLS: Status: ACTIVE | Noted: 2018-01-01

## 2018-06-13 NOTE — PROGRESS NOTES
Not ready for discharge today, as Cardiology wanted to monitor on telemetry.  In further review of chart, Nsg noting significant forgetfulness.  His Daughter Alexandra is his HCA.  She resides in [place].  Care Coordinator has called her an explained Observation status and updated her that SW would be calling her.

## 2018-06-13 NOTE — PLAN OF CARE
Problem: Patient Care Overview  Goal: Plan of Care/Patient Progress Review  Outcome: Improving  A&OX3, forgetful to situation. VSS. Denies pain. NPO prior to nuc stress test. Up with SBA and walker, up in chair, amb to BR and in ryder. Denies dizziness. Tele shows Afib with CVR, Cards and EP following. Several abrasions from previous falls. R LE swelling present. R groin hernia present, anti fungal powder applied. Dtr updated by RN, cards called but dtr missed the call.

## 2018-06-13 NOTE — CONSULTS
Consult Date:  06/13/2018      REASON FOR CONSULTATION:  Evaluation of frequent falls and cardiomyopathy.      HISTORY OF PRESENT ILLNESS:  Mr. Ponce is a delightful 88-year-old gentleman with a history of hypertension, hyperlipidemia, chronic kidney disease (creatinine around 2) and coronary disease (CABG 2004), permanent atrial fibrillation and a recent stroke in November 2017, who presents for frequent episodes of fall.  EP was consulted to help with management.      The patient is in permanent atrial fibrillation.  Apparently the last couple of months, he started having frequent episodes of fall resulting in limbs and head trauma.  Anticoagulation was then discontinued due to the concern for the frequent episodes of fall.      He was admitted 06/11 due to the frequent episodes of fall and failure to thrive.  During admission, head and neck CT were unremarkable.  He was evaluated by Cardiology and a nuclear stress test was scheduled for today.  Echocardiogram revealed an EF around 35-40%.     Of note, the patient is a poor historian.  It is difficult to tease out whether or not he actually has dizziness prior to these episodes of fall.  Talking to the nursing staff,  they witnessed several episodes here.  He seems to trip and fall.  He also hs problems with insomnia and becomes very somnolent during the day.  He falls asleep easy while sitting on the chair and sometimes he falls or hits his head.  Telemetry shows Afib with good heart rate control and no significant bradycardia or tachycardia was seen.      Regarding LV dysfunction.  In November 2017, he was found to have LV dysfunction (EF around 35-40%).  Echocardiogram was repeated yesterday and EF is still around 30-40%.   He had moderate to severe MR and moderate TR.  Mild to moderate AI is also noticed.      ASSESSMENT AND PLAN:   1.  Frequent falls.  Unlikely to be related to arrhythmia.  He has had several episodes of falls here and no bradycardia or  tachycardia was seen on telemetry.  I recommend continuing telemetry and consider a 30-day event monitor at the discharge.  2.  Cardiomyopathy.  Likely ischemic in origin.  He is scheduled for stress test today.  He may fulfill criteria for defibrillator for primary prevention if no intervention is done; however, due to advanced age and the fact that he is showing signs failure to thrive, I am not sure this is the best option for him at this time.  We will try to contact his daughter to discuss plans and see if the defibrillator is something they would be interested in, otherwise we will continue a conservative approach.    3. Afib.  Heart rate is well controlled.  4. Embolic prevention.  CHADS-Vasc of 4.  He may be a candidate for Watchmann procedure. He will follow up in clinic with us to discuss possibility of Watchmann.    Benjamin Lieberman MD    Physical Exam:  Vitals: /86 (BP Location: Left arm)  Pulse 65  Temp 97.3  F (36.3  C) (Axillary)  Resp 18  Wt 56.6 kg (124 lb 12.5 oz)  SpO2 98%  BMI 18.43 kg/m2      Intake/Output Summary (Last 24 hours) at 06/14/18 0957  Last data filed at 06/13/18 2116   Gross per 24 hour   Intake              240 ml   Output                0 ml   Net              240 ml     Vitals:    06/11/18 1700 06/13/18 0600   Weight: 56.2 kg (123 lb 12.8 oz) 56.6 kg (124 lb 12.5 oz)       Constitutional:  AAO x3.  Pt is in NAD.  HEAD: Normocephalic.  SKIN: Skin normal color, texture and turgor with no lesions or eruptions.  Eyes: PERRL, EOMI.  ENT:  Supple, normal JVP. No lymphadenopathy or thyroid enlargement.  Chest:  Decrease breath sound in base B/L  Cardiac:  IIR, variable sound of S1 and Normal S2.    Systolic murmur in LLSB II/VI.  Abdomen:  Normal BS.  Soft, non-tender and non-distended.  No rebound or guarding.    Extremities:  Pedious pulses palpable B/L.  No LE edema noticed.   Neurological: Strength and sensation grossly symmetric and intact throughout.         Review of  Systems:  Complete review of system is otherwise negative with the exception of what was described above.     CURRENT MEDICATIONS:    aspirin  81 mg Oral Daily     losartan  50 mg Oral Daily     metoprolol tartrate  50 mg Oral BID     multivitamin, therapeutic with minerals  1 tablet Oral Daily     sertraline  50 mg Oral Daily     sodium chloride (PF)  3 mL Intracatheter Q8H     torsemide (DEMADEX) tablet 10 mg  10 mg Oral Daily     PRN Meds: acetaminophen, acetaminophen, melatonin, miconazole, naloxone, ondansetron **OR** ondansetron, sodium chloride (PF), sodium chloride (PF), sodium chloride (PF), sodium chloride bacteriostatic    ALLERGIES   No Known Allergies    PAST MEDICAL HISTORY:  Past Medical History:   Diagnosis Date     afib 2004, 2010, 2012    post op and then again 2010, 2012     Anxiety 01/2018    added zoloft     ASCVD (arteriosclerotic cardiovascular disease) 2004    angio for cp and 2 vessel dz, cabg done 2004, post op afib     Cardiomyopathy (H) 11/2017    found on echo done during cvi in [facility]; echo 1/18 here with ef 38%, global hypokinesis     CKD (chronic kidney disease) stage 4, GFR 15-29 ml/min (H)      CVA (cerebral vascular accident) (H) 11/25/2017    L MCA embolism, received TPA. No residual deficits, add coumadin     Genital herpes      HTN (hypertension)      Hypercholesteremia      Pseudogout      PVD (peripheral vascular disease) with claudication (H)        PAST SURGICAL HISTORY:  Past Surgical History:   Procedure Laterality Date     CATARACT IOL, RT/LT  2006     CORONARY ARTERY BYPASS  2004     HYDROCELECTOMY SCROTAL  1997     TONSILLECTOMY       TURP  1997       FAMILY HISTORY:  Family History   Problem Relation Age of Onset     Breast Cancer Sister        SOCIAL HISTORY:  Social History     Social History     Marital status:      Spouse name: N/A     Number of children: 3     Years of education: N/A     Occupational History           Social History Main  Topics     Smoking status: Former Smoker     Quit date: 1954     Smokeless tobacco: Never Used     Alcohol use No     Drug use: No     Sexual activity: Not Currently     Other Topics Concern     Not on file     Social History Narrative         Recent Lab Results:    Recent Labs  Lab 18  0915 18  1745   WBC  --  5.7   HGB  --  10.7*   MCV  --  94   PLT  --  219   INR  --  1.20*    142   POTASSIUM 4.9 4.3   CHLORIDE 108 107   CO2 26 26   BUN 47* 48*   CR 2.11* 2.12*   ANIONGAP 7 9   GARETT 9.4 9.3   GLC 92 94                ANMOL GUZMAN MD             D: 2018   T: 2018   MT: SHAYAN      Name:     DEMI BAILEY   MRN:      [id number]        Account:       [id number]   :      10/06/1929           Consult Date:  2018      Document: [id number]       cc: Azar Zaldivar MD

## 2018-06-13 NOTE — TELEPHONE ENCOUNTER
Reason for Call:  Other     Detailed comments: alexandra would like to speak with dr. Zaldivar regarding her fathers health. Health care directive on file for Alexandra    Phone Number Patient can be reached at: [phone]    Best Time: After 4:30 pm    Can we leave a detailed message on this number? YES    Call taken on 6/13/2018 at 1:22 PM by Dalia Butt

## 2018-06-13 NOTE — PROGRESS NOTES
Lexiscan performed, pt reports slight feeling of SOB which resolved on own,,VSS, pt tolerated procedure well, pt transported back to room via wheelchair.

## 2018-06-13 NOTE — PROGRESS NOTES
[facility]  Hospitalist Progress Note  Name: Bj Ponce    MRN: [id number]  Physician:  Jonathan Dove DO, FHM (Text Page)    Assessment & Plan   Summary of Stay: Bj Ponce is a 88 year old male who was admitted on 6/11/2018.   He has a history of coronary artery disease, atrial fibrillation, CKD stage IV, cardiomyopathy, anxiety and 1-2 month of increasing falls versus syncope, who was admitted directly from clinic for evaluation of recurrent falls.     Recurrent falls, unclear if syncope component.  Patient also has 6 months of episodes of feeling drowsy:  -  Appreciate cardiology assistance.  Patient may benefit from a pacemaker/defibrillator though this also may not solve his falls as the definite etiology is unclear.  A nuc stress test was done today and we are waiting for the results.    -  I await cardiology plan.  If there is not felt a probable cardiac source of his issues I would recommend consideration of neurology consult as he perceives he started having drowsy episodes and near falls all the way back to just following his CVA in 11/2017.  -  PT/OT    CVA 11/2017, no major residual:  - Has no focal neurological deficits but states has lost his strength ever since then which might be contributing to his recurrent falls; he was at [facility] T[facility] following stroke; might need long-term nursing home placement  - He was started on a statin after his stroke in November; however, this was discontinued due to elevated LFTs    Paroxysmal afib/flutter, CAD, cardiomyopathy:  -  Difficult to offer much anticoag given high fall risk besides ASA.  He was on warfarin but it was stopped in the past.  He has a high CHADS-VASC score though.  -  Resumed torsemide for tomorrow  -  Continue metoprolol and losartan  -  Await stress test results/cardiology plan    Deconditioning:  -  Poor activity for several months since his CVA.  His level of deconditioning may be playing a role in the  falls.    Anxiety:  -  Zoloft (This can cause falls but also helps his anxiety by his report).  Continue for now.  I doubt this is the primary cause of his falls.    CKD Stage IV:  -  Follow creatinine, not too far from baseline at the moment.    DVT Prophylaxis: Pneumatic Compression Devices  Code Status: Full Code    Disposition Plan   Expected discharge unclear, likely needs a couple more nights in the hospital.     Entered: Jonathan Dove 06/13/2018, 4:45 PM      # Pain Assessment:  Current Pain Score 6/13/2018   Patient currently in pain? denies   Pain score (0-10) -   Denies pain.    I was contacted by UR this evening.  Given the patient has failed another recent OBS stay, has syncope concern with possible serious cardiac issues that requires ongoing testing and possible additional neurologic evaluation he will be changed to inpatient as I expect he may need at least a couple more days in the hospital.    Interval History   Assumed care, history reviewed.  Denies pain, no sob/nausea.  He reports no falls today though he feels he almost had one of his sleepy/falling forward episodes earlier.  He feels most his complaints have been since his 11/2017 CVA though the falls are dominantly 4-8 weeks.  I also spoke with his daughter by phone and her perception is he has been very inactive and his global weakness is also part of the problem.    -Data reviewed today: I reviewed all new labs and imaging reports over the last 24 hours. I personally reviewed no images or EKG's today.    Physical Exam   Temp: 95.4  F (35.2  C) Temp src: Axillary BP: 155/75 Pulse: 64 Heart Rate: 67 Resp: 16 SpO2: 95 % O2 Device: None (Room air)    Vitals:    06/11/18 1700 06/13/18 0600   Weight: 56.2 kg (123 lb 12.8 oz) 56.6 kg (124 lb 12.5 oz)     Vital Signs with Ranges  Temp:  [95.4  F (35.2  C)-98.2  F (36.8  C)] 95.4  F (35.2  C)  Pulse:  [62-75] 64  Heart Rate:  [67-77] 67  Resp:  [16-18] 16  BP: (132-155)/(55-85) 155/75  SpO2:  [94  %-100 %] 95 %  I/O last 3 completed shifts:  In: 240 [P.O.:240]  Out: 875 [Urine:875]    GEN:  Alert, oriented x 3, confused with some details, appears comfortable, NAD.  HEENT:  Normocephalic/atraumatic, no scleral icterus, no nasal discharge, mouth moist.  CV:  Irreg with rate 60's, II/VI murmur.    LUNGS:  Clear to auscultation bilaterally without rales/rhonchi/wheezing/retractions.  Symmetric chest rise on inhalation noted.  ABD:  Active bowel sounds, soft, non-tender/non-distended.  No rebound/guarding/rigidity.  EXT:  Trace LE edema.  No cyanosis.  No acute joint synovitis noted.  SKIN:  Dry to touch, no exanthems noted in the visualized areas.    Medications       losartan  50 mg Oral Daily     metoprolol tartrate  50 mg Oral BID     multivitamin, therapeutic with minerals  1 tablet Oral Daily     sertraline  50 mg Oral Daily     sodium chloride (PF)  3 mL Intracatheter Q8H     Data       Recent Labs  Lab 06/11/18  1745   WBC 5.7   HGB 10.7*   HCT 32.8*   MCV 94          Recent Labs  Lab 06/11/18  1925   CULT <10,000 colonies/mLurogenital lillian  Susceptibility testing not routinely done       Recent Labs  Lab 06/11/18  1745      POTASSIUM 4.3   CHLORIDE 107   CO2 26   ANIONGAP 9   GLC 94   BUN 48*   CR 2.12*   GFRESTIMATED 30*   GFRESTBLACK 36*   GARETT 9.3         No results found for this or any previous visit (from the past 24 hour(s)).

## 2018-06-13 NOTE — PROGRESS NOTES
SW:  D:  Returned a phone call to daughter Alexandra who lives in [place].  She expressed great frustration with the Observation Status her father is under and patient not being eligible for SNF benefits in a TCU.  She stated her father does have the means to pay for the TCU care however he is angry he is not eligible for Medicare benefits and thus doesn't want to use his personal funds.  She understands her father's feelings but also is concerned for his safety if he goes home and continues to have these falls.  She also expressed frustration because patient's PCP did not accept a call from her and she doesn't feel there is one person in charge of patient due to the rotating hospitalist program.  Writer offered to have the hospitalist contact her with an update.  She tutors and said it is hard for her to answer calls if she is with a student.  She asked that the hospitalist either try to reach her or her brother Roberto whose number is [phone].  Writer spoke with Dr Dove.

## 2018-06-13 NOTE — PLAN OF CARE
Problem: Patient Care Overview  Goal: Plan of Care/Patient Progress Review  Outcome: No Change  Pt forgetful. Sleeping between cares. Bed alarm on. Tele Afib w/ CVR. VSS. Pt is NPO. Plan for nuc. Stress test and EP consult. Will continue to monitor pt.

## 2018-06-13 NOTE — PLAN OF CARE
Problem: Patient Care Overview  Goal: Plan of Care/Patient Progress Review  Outcome: No Change  3-7p. Pt A/O#3, forgetful, calm/cooperative, VSS on RA, denies pain, up w/1/GB/walker to BR, good po, denies dizziness, c/o sleepiness, Echo done-seen by Cards this shift, EP consult in am, Metoprolol on hold until stress test done tomorrow, tele-a-fib w/CVR, will monitor.

## 2018-06-14 NOTE — PLAN OF CARE
Problem: Patient Care Overview  Goal: Plan of Care/Patient Progress Review  Outcome: Improving  A&Ox4, forgetful.  Ax1 with GB, walker.  Up to chair for dinner.  Too fatigued to walk.  Tolerates regular diet.  VSS on RA.  Denies pain.  Tele: A-flutter.  Saline locked.  Hernia to right groin.  Miconazole to groin.  Various scattered abrasions over body; head, right elbow, spine.  PRN Melatonin at HS.  Feeling restless, states he needs to alternate between sitting edge of bed and lying down to help sleep.  Question if this explains his falling out of bed at home.  Switched to inpatient status this evening after stress test.  Discharge to TCU pending.

## 2018-06-14 NOTE — PROGRESS NOTES
[facility]  Cardiology Progress Note    Date of Service (when I saw the patient): 06/14/2018    Summary: Bj Ponce is a 88 year old male with history of CAD with prior CABG in 2004, atrial fibrillation, CKD, cardiomyopathy, CVA in November of 2017 and recurrent falls who was admitted on 6/11/2018 with recurrent falls.   Interval History   He denies chest discomfort or shortness of breath. He does not some episodes of anxiety. He nods off easily during our conversation but is easily arousable.  Assessment & Plan   1.  Recurrent falls and syncope. Telemetry has been unremarkable here.   -  Event monitor has been ordered at discharge.   2.  Cardiomyopathy. Etiology not entirely clear, possibly ischemic. Echo shows LVEF of 35 - 40% with global hypokinesis.   -  Maintained on Torsemide 10 mg daily, lopressor 50 mg BID and losartan 50 mg daily.   3.  Coronary artery disease s/p CABG x 2 in 2004 with a LIMA to the LAD and vein graft to the ramus intermedius.  -  On aspirin and beta blocker. Statin has been discontinued due to elevated LFTs.  -  Nuclear stress test shows a small to moderate sized partially reversible inferior/inferolateral defect from the base to the apex consistent with a small nontransmural infarct with mild to moderate monica-infarct ischemia.  3.  Chronic atrial fibrillation. He has been on coumadin previously, most recently after his stroke in 2017 but this was stopped due to recurrent falls. His [id number] score is high.  -   EP consulted. He is planning to follow up in clinic to discuss possible Watchman procedure.   4.  CKD stage III. Baseline creatinine around 2.   5.  Hypertension.   6.  Cognitive impairment. Neurology and neuropsychology have been consulted.  7.  Moderate to severe mitral regurgitation.  8.  Moderate tricuspid regurgitation.   9.  CVA. MRI with L parietal infarction in November of 2017.    Plan:  1.  Discussed stress test results with patient. We discussed  ongoing medical management vs further invasive work up with an angiogram. Given his CKD, frequent falls, and other medical comorbidities and as he remains asymptomatic from a cardiac perspective, would recommend ongoing medical management.   2.  He is on appropriate medications.  3.  Cardiology follow up arranged on 6/21 at 12:30 and Dr. Coleman on 8/13 at 3:45 pm.    Lulu Juarez PA-C    Physical Exam   Temp: 97.3  F (36.3  C) Temp src: Axillary BP: 147/86 Pulse: 65 Heart Rate: 70 Resp: 18 SpO2: 98 % O2 Device: None (Room air)    Vitals:    06/11/18 1700 06/13/18 0600   Weight: 56.2 kg (123 lb 12.8 oz) 56.6 kg (124 lb 12.5 oz)     Vital Signs with Ranges  Temp:  [95.4  F (35.2  C)-97.9  F (36.6  C)] 97.3  F (36.3  C)  Pulse:  [62-75] 65  Heart Rate:  [67-70] 70  Resp:  [16-18] 18  BP: (129-155)/(70-86) 147/86  SpO2:  [94 %-100 %] 98 %  06/09 0700 - 06/14 0659  In: 1360 [P.O.:1360]  Out: 2000 [Urine:2000]  Net: -640  Constitutional: NAD.  Respiratory: breath sounds with few basilar crackles otherwise clear.  Cardiovascular: IRR, s1s2, II/VI systolic murmur at the LLSB  GI: soft, BS+  Skin: warm, no rashes  Musculoskeletal: Moving all extremities  Neurologic: Alert, oriented x 3.  Neuropsychiatric: Normal affect. Doses off during our conversation but is easily arousable.     Data     Recent Labs  Lab 06/14/18  0915 06/11/18  1745   WBC  --  5.7   HGB  --  10.7*   MCV  --  94   PLT  --  219   INR  --  1.20*    142   POTASSIUM 4.9 4.3   CHLORIDE 108 107   CO2 26 26   BUN 47* 48*   CR 2.11* 2.12*   ANIONGAP 7 9   GARETT 9.4 9.3   GLC 92 94       Echocardiogram 6/12/18:  Interpretation Summary  Left ventricular systolic function is moderately reduced. The visual ejection fraction is estimated at 30-40%.  There is moderate global hypokinesia of the left ventricle. There is  inferolateral wall akinesis. There is moderate biatrial enlargement.  Immobile mitral valve posterior leaflet is responsible for the jet of  moderate to mod-severe (2-3+ or 3+) mitral regurgitation.  There is at least moderate (2+) tricuspid regurgitation. Right ventricular systolic pressure is elevated, consistent with moderate to severe pulmonary hypertension.  The ascending aorta is mildly dilated. There is mild to moderate (1-2+) aortic regurgitation.  The rhythm was atrial fibrillation.  Compared to the prior echo, there has been a marked increase in mitral and tricuspid insufficiency.    Tele atrial fibrillation    Medications       aspirin  81 mg Oral Daily     losartan  50 mg Oral Daily     metoprolol tartrate  50 mg Oral BID     multivitamin, therapeutic with minerals  1 tablet Oral Daily     sertraline  50 mg Oral Daily     sodium chloride (PF)  3 mL Intracatheter Q8H     torsemide (DEMADEX) tablet 10 mg  10 mg Oral Daily

## 2018-06-14 NOTE — TELEPHONE ENCOUNTER
Dr. Zaldivar, We do not have a consent to communicate on file for anyone other than Mingo Welsh, who is a friend of Oxana. We do have a HCD on file naming Alexandra as his health care agent, but that does not give us legal consent to communicate in this format.     Per Jessica Madrigal, we are not able to call Alexandra and discuss any private health information. We will need to have a signed consent on file first. I will contact Alexandra to advise her of this. Bj has been given and completed 2 different consent to communicate forms this month, but did not include her as an approved contact on either.

## 2018-06-14 NOTE — PLAN OF CARE
Problem: Patient Care Overview  Goal: Plan of Care/Patient Progress Review  Outcome: No Change  Pt alert and oriented X 2,forgetful. Up to bathroom with SBA, walker and gaitbelt. Telemetry A flutter variable conduction, VSS. Dressing intact on Right elbow, coccyx. Slept poorly even after dose of melatonin. Cardiology following.

## 2018-06-14 NOTE — PLAN OF CARE
Problem: Patient Care Overview  Goal: Plan of Care/Patient Progress Review  Outcome: No Change  Pt A&Ox4, forgetful and needs occasional reorientation.  up with 1 with walker/gait belt.  Up in chair for meals. Pt has periods of falling asleep during shift during conversation.  Denies pain.  LS clear, diminshed, SOB with exertion. +1 edema in R leg, blanchable redness on heels, abrasions on head/elbow. Tele shows atrial flutter. Ordered tests are being done to determine cause of falls, pt will be discharged with 30-day heart monitor.  EEG done.  MRI ordered pending.  Neuropsychology and Neurology consults pending. Continue to monitor.

## 2018-06-14 NOTE — CONSULTS
[facility]    Neurology Consultation Note     Bj Ponce MRN# [id number]   YOB: 1929 Age: 88 year old    Code Status:Full Code   Date of Admission: 6/11/2018  Date of Consult: 06/14/2018    _________________________________   Primary Care Physician   Azar Zaldivar      ______________________________________________         Assessment & Plan     Reason for consult: I was asked by Dr. Armas to evaluate this patient for syncopal/narcoleptic episodes        ______________________________________________  #. (R55) Syncope, unspecified syncope type  --CT head negative for acute abnormality  --appears to fall asleep while conversing - he is not always aware  --will get MRI brain & EEG for further evaluation  #. (R29.6) Recurrent falls  (primary encounter diagnosis)  --management per primary service  #. (I48.2) Permanent atrial fibrillation (H)  --management per hospitalist & cardiology  #. DVT Prophylaxis  --per primary service  #. PT/OT/Speech  --continue evaluations  #. Nutrition / GI Prophylaxis  --Per recommendations of speech therapy      #. Code Status: Full Code      Chief Complaint   ______________________________________________  Recurrent syncopal spells   History is obtained from the patient and electronic health record    History of Present Illness   ______________________________________________  Bj Ponce is a 88 year old male who presented on 6/11/18 with recurrent falls in the past month. He had several ED visits, as well as an observation stay at [facility]. His PCP stopped his chronic coumadin approximately 10 days prior to admission due to the recurrent falls. He was hospitalized at [facility] for 48 hours and discharged the day prior to this admission, for similar presentation. The patient has had several falls where he just falls over, including sitting in a chair or on the toilet. None of the falls have been witnessed. Reportedly had a stroke while out  [facility] for Thanksgiving visiting family - left MCA stroke. He received tPA. Initially this admission, cardiology was consulted for further evaluation. Their workup is essentially completed and they recommend an event monitor at discharge. Hospitalist noted today that while interviewing the patient, he seems to fall asleep mid-sentence. Neurology was consulted for further evaluation.     On exam, patient is oriented, answers questions appropriately, able to give history of his medical condition. He states he gets no warning before he just suddenly feels tired, leans his head over and his legs give out. He states his endurance is not what it used to be. He reports he moved to a new place in February, and since just before his move he has had trouble sleeping at night and is always tired during the day. He doesn't understand why he can't just sleep. EEG was just completed - he did not like the test. Discussed the possibility of needing a sleep study, but he would be hooked to EEG all night, and he is not interested in this. No focal neurological findings on exam, but he did have occasional periods of falling asleep in mid-conversation.     Past Medical History    ______________________________________________  Past Medical History:   Diagnosis Date     afib 2004, 2010, 2012    post op and then again 2010, 2012     Anxiety 01/2018    added zoloft     ASCVD (arteriosclerotic cardiovascular disease) 2004    angio for cp and 2 vessel dz, cabg done 2004, post op afib     Cardiomyopathy (H) 11/2017    found on echo done during cvi in [facility]; echo 1/18 here with ef 38%, global hypokinesis     CKD (chronic kidney disease) stage 4, GFR 15-29 ml/min (H)      CVA (cerebral vascular accident) (H) 11/25/2017    L MCA embolism, received TPA. No residual deficits, add coumadin     Genital herpes      HTN (hypertension)      Hypercholesteremia      Pseudogout      PVD (peripheral vascular disease) with claudication (H)      Past  Surgical History   ______________________________________________  Past Surgical History:   Procedure Laterality Date     CATARACT IOL, RT/LT  2006     CORONARY ARTERY BYPASS  2004     HYDROCELECTOMY SCROTAL  1997     TONSILLECTOMY       TURP  1997     Prior to Admission Medications   ______________________________________________  Prior to Admission Medications   Prescriptions Last Dose Informant Patient Reported? Taking?   LOSARTAN POTASSIUM PO 6/10/2018 at Unknown time Spouse/Significant Other Yes Yes   Sig: Take 50 mg by mouth daily   TORSEMIDE PO 6/10/2018 at Unknown time Spouse/Significant Other Yes Yes   Sig: Take 10 mg by mouth daily   aspirin EC 81 MG EC tablet 6/10/2018 at Unknown time Spouse/Significant Other No Yes   Sig: Take 1 tablet (81 mg) by mouth daily   clotrimazole (LOTRIMIN) 1 % cream 6/10/2018 at Unknown time Spouse/Significant Other Yes Yes   Sig: Apply topically 2 times daily Apply to feet and between toes   clotrimazole-betamethasone (LOTRISONE) cream 6/10/2018 at Unknown time Spouse/Significant Other No Yes   Sig: Apply topically 2 times daily   metoprolol tartrate (LOPRESSOR) 25 MG tablet  Spouse/Significant Other No No   Sig: Take 2 tablets (50 mg) by mouth 2 times daily   sertraline (ZOLOFT) 50 MG tablet 6/10/2018 at Unknown time Spouse/Significant Other No Yes   Sig: Take 1 tablet (50 mg) by mouth daily      Facility-Administered Medications: None     Allergies   No Known Allergies    Social History   ______________________________________________  Social History     Social History     Marital status:      Spouse name: N/A     Number of children: 3     Years of education: N/A     Occupational History           Social History Main Topics     Smoking status: Former Smoker     Quit date: 4/16/1954     Smokeless tobacco: Never Used     Alcohol use No     Drug use: No     Sexual activity: Not Currently     Other Topics Concern     Not on file     Social History Narrative        Family History   ______________________________________________  Family History   Problem Relation Age of Onset     Breast Cancer Sister        Review of Systems   ______________________________________________  A comprehensive review of  10 systems was performed and found to be negative except as described in this note  CONSTITUTIONAL: negative for fever, chills, change in weight  INTEGUMENTARY/SKIN: no rash or obvious new lesions  ENT/MOUTH: no sore throat, new sinus pain or nasal drainage, no neck mass noted  RESP: No pleuretic pain, No cough, no hemoptysis, No SOB   CV: negative for chest pain, palpitations or peripheral edema  GI: no nausea, vomiting, change in stools  : no dysuria or hematuria  MUSCULOSKELETAL: no myalgias, arthralgias or join efffusion  ENDOCRINE: no history of polyuria, polydyspsia or symptoms of thyroid dysfunction  PSYCHIATRIC: no change in mood stable  LYMPHATIC: no new lymphadenopathy  HEME: no bleeding or easy bruisability  NEURO: see HPI    Physical Exam   ______________________________________________  Weight:124 lbs 12.49 oz; Height:Data Unavailable  Temp: 97.3  F (36.3  C) Temp src: Axillary BP: 147/86 Pulse: 65 Heart Rate: 70 Resp: 18 SpO2: 98 % O2 Device: None (Room air)    General Appearance:  No acute distress  Neuro:       Mental Status Exam:   Awake, alert, oriented X3. Periodically falls asleep during conversation. Speech and language are intact. Mental status is normal       Cranial Nerves:  Pupils 3 mm, reactive. EOMI. Face sensation is normal. Face is symmetric. Tongue and uvula are midline. Other CN are normal           Motor:  5/5 X 4. Tone and bulk are normal           Reflexes:  Normal DTR. Toes downgoing.        Sensory:  Normal to light touch               Coordination:   Intact finger-to-nose        Gait:  Up with assistance  Neck: no nuchal rigidity, normal thyroid. No carotid bruits.    Cardiovascular: Regular rate and rhythm, no m/r/g  Lungs: Clear to  auscultation  Abdomen: Soft, not tender, not distended  Extremities: No clubbing, no cyanosis, no edema    Data   ______________________________________________  All Data personally reviewed:       Labs:   CBC RESULTS:       Recent Labs  Lab 06/14/18  1218 06/11/18  1745   WBC 6.3 5.7   RBC 3.55* 3.50*   HGB 10.9* 10.7*   HCT 33.8* 32.8*    219     Basic Metabolic Panel:   Recent Labs   Lab Test  06/14/18   0915  06/11/18   1745  06/03/18   1230   NA  141  142  142   POTASSIUM  4.9  4.3  4.6   CHLORIDE  108  107  109   CO2  26  26  28   BUN  47*  48*  49*   CR  2.11*  2.12*  1.95*   GLC  92  94  96   GARETT  9.4  9.3  9.6     Liver panel:  Recent Labs   Lab Test  01/25/18   1449 12/12/17 12/06/16   0936   PROTTOTAL  6.9   --   7.5   ALBUMIN  2.4*   --   3.0*   BILITOTAL  0.7   --   0.6   ALKPHOS  227*   --   182*   AST  43  34  34   ALT  25  40  38     INR:  Recent Labs   Lab Test  06/11/18   1745  06/03/18   1230 05/18/18 05/08/18 04/24/18   INR  1.20*  1.49*  2.8*  2.5*  3.7*      Lipid Profile:  Recent Labs   Lab Test  12/06/16   0936  10/05/15   0858  08/05/14   0819  05/24/13   0953  04/25/12   0808   CHOL  151  161  142  145  145   HDL  50  54  53  47  42   LDL  78  82  64  78  82   TRIG  115  123  124  116  106   CHOLHDLRATIO   --   3.0  2.7  3.2  3.5     UA Results:  Recent Labs   Lab Test  06/11/18   1925   COLOR  Yellow   APPEARANCE  Clear   URINEGLC  Negative   URINEBILI  Negative   URINEKETONE  Negative   SG  1.011   UBLD  Negative   URINEPH  6.0   PROTEIN  100*   NITRITE  Negative   LEUKEST  Small*   RBCU  <1   WBCU  12*     Most Recent 6 Bacteria Isolates From Any Culture (See EPIC Reports for Culture Details):  Recent Labs   Lab Test  06/11/18   1925   CULT  <10,000 colonies/mL  urogenital lillian    Susceptibility testing not routinely done        Cardiac US:   Left ventricular systolic function is moderately reduced. The visual ejection fraction is estimated at 30-40%. There is moderate global  hypokinesia of the left ventricle. There is inferolateral wall akinesis.  There is moderate biatrial enlargement. Immobile mitral valve posterior leaflet is responsible for the jet of moderate to mod-severe (2-3+ or 3+) mitral regurgitation. There is at least moderate (2+) tricuspid regurgitation. Right ventricular systolic pressure is elevated, consistent with moderate to severe pulmonary hypertension. The ascending aorta is mildly dilated. There is mild to moderate (1-2+) aortic  Regurgitation. The rhythm was atrial fibrillation. Compared to the prior echo, there has been a marked increase in mitral and tricuspid insufficiency.       Neurophysiology:   --       Imaging:   All imaging studies were reviewed personally  CT head 6/11/18:   Diffuse cerebral volume loss and cerebral white matter changes consistent with chronic small vessel ischemic disease. No evidence for acute intracranial pathology.        Text Page    Iona Ghotra, MAKAYLA, FNP-BC, RN CNRN

## 2018-06-14 NOTE — PROGRESS NOTES
[facility]    EP Progress Note    Date of Service (when I saw the patient): 06/14/2018     Assessment & Plan   Bj Ponce is a 88 year old male with h/o HTN, dyslipidemia, CKDz (Cr 2), CAD s/p CABG 2004, chronic AFib and CVA 11/2017 who was admitted on 6/11/2018 due to frequent falls resulting in limb and head trauma.  EP consulted (Dr. Lieberman 6/13) and noted echo showed EF 35-40%. AC has been DCd due to falls.    1. Frequent Falls -   * Tele has been stable - AFib 60-70s  * Staff has witnessed tripping and falling asleep in chairs resulting in falls    * Pt does not endorse dizziness/lightheadedness prior to falls or LOC     PLAN:   * Continue telemetry, which thus far has been stable   * Placed 30 day Event Monitor on DC and FU with us in clinic. I have ordered Event Monitor and he will see me July 23 @ 11 AM in [place] (Ursula Sherman PA-C)    2. CM (likely ischemic)  * Stable EF ~35% based on echo done 11/0217 at time of CVA   * PTA on lopressor 50 mg BID and losartan 50 mg daily  * PTA on torsemide 10 mg daily  * Stress test 6/13 with abnormalities noted.     PLAN:   * No evidence of fluid overload on current doses of meds   * Per Dr. Lieberman, may be candidate for ICD BUT given overall health would require discussion with family.   * Nuc med results to be reviewed by Dr. Coleman    3. Permanent AFib -  * HR under good control on PTA meds lopressor 50 mg BID  * CHADSVASc 7 (CM, HTN, CVA, CAD, age) but AC was DCd due to falls resulting in trauma.     PLAN:   * Avoid AC given traumatic falls    * Event Monitor as planned.         Carri Sherman PA-C    Interval History   Pt sleeping soundly. I DID NOT WAKE.    Physical Exam   Temp: 97.3  F (36.3  C) Temp src: Axillary BP: 147/86 Pulse: 65 Heart Rate: 70 Resp: 18 SpO2: 98 % O2 Device: None (Room air)    Vitals:    06/11/18 1700 06/13/18 0600   Weight: 56.2 kg (123 lb 12.8 oz) 56.6 kg (124 lb 12.5 oz)     Vital Signs with Ranges  Temp:  [95.4  F  (35.2  C)-97.9  F (36.6  C)] 97.3  F (36.3  C)  Pulse:  [62-75] 65  Heart Rate:  [67-70] 70  Resp:  [16-18] 18  BP: (129-155)/(70-86) 147/86  SpO2:  [94 %-100 %] 98 %  I/O last 3 completed shifts:  In: 240 [P.O.:240]  Out: 200 [Urine:200]    Telemetry: AFib 60-70s  Constitutional: Frail. Sleeping on L side comfortably  Respiratory: Non-labored breathing  Cardiovascular: Irregular  Musculoskeletal: No edema    Medications       aspirin  81 mg Oral Daily     losartan  50 mg Oral Daily     metoprolol tartrate  50 mg Oral BID     multivitamin, therapeutic with minerals  1 tablet Oral Daily     sertraline  50 mg Oral Daily     sodium chloride (PF)  3 mL Intracatheter Q8H     torsemide (DEMADEX) tablet 10 mg  10 mg Oral Daily       Data   Results for orders placed or performed during the hospital encounter of 06/11/18 (from the past 24 hour(s))   NM MPI w Lexiscan    Narrative    GATED MYOCARDIAL PERFUSION SCINTIGRAPHY WITH INTRAVENOUS PHARMACOLOGIC  VASODILATATION LEXISCAN -ONE DAY STUDY     6/13/2018 2:54 PM  DEMI BAILEY  88 years  Male  10/6/1929.    Indication/Clinical History: Ischemic cardiomyopathy status post  coronary bypass surgery with atrial fibrillation    Impression  1.  Myocardial perfusion imaging using single isotope technique  demonstrated small to moderate size partly reversible  inferior/inferolateral defect extending from the base to the apex  consistent with small nontransmural infarct with mild-to-moderate  monica-infarct ischemia extending to the apex. Specificity maybe  affected by significant diaphragm attenuation/bowel uptake artifact.  No other significant ischemia or infarct noted..   2. Gated images demonstrated moderately dilated left ventricle with  severe decrease in overall contractility with severe global  hypokinesis and akinesis of the inferolateral wall at the base..  The  left ventricular systolic function is severely reduced with ejection  fraction of 22%.  3. Compared to the prior  study from 3/17/2005 there is mild to  moderate ischemia in the area of the previously described  inferolateral scar .    Procedure  Pharmacologic stress testing was performed with Lexiscan at a rate of  0.08 mg/ml rapid bolus injection, for 15 seconds, 0.4 mg/5ml  intravenously. Low-level exercise was not performed along with the  vasodilator infusion.  The heart rate was 67 at baseline and estiven to  65 beats per minute during the Lexiscan infusion. The rest blood  pressure was 152/70 mmHg and was 134/76 mm Hg during Lexiscan  infusion. The patient experienced shortness of breath  during the  test.    Myocardial perfusion imaging was performed at rest, approximately 45  minutes after the injection intravenously of 8.0 mCi of Tc-99m  Myoview. At peak pharmacologic effect, 10-20 seconds after Lexiscan,   the patient was injected intravenously with 26.0 mCi of  Tc-99m  Myoview. The post-stress tomographic imaging was performed  approximately 60 minutes after stress.    EKG Findings  The resting EKG demonstrated atrial fibrillation with evidence of old  inferolateral infarct with isolated ventricular ectopy and nonspecific  ST-T wave changes. The stress EKG demonstrated atrial fibrillation  with old inferolateral infarct and no significant ST depression.    Tomographic Findings  Overall, the study quality is fair with significant diaphragm  attenuation/bowel uptake artifact . On the stress images, there is a  some small to moderate size mild inferior/inferolateral defect  extending from the base towards the apex. On the rest images, there is  a small mild inferolateral defect at the base with mild decrease in  apical activity . Gated images demonstrated moderately dilated left  ventricle with severe decrease in overall contractility with severe  global hypokinesis and akinesis of the inferolateral wall at the base.  The left ventricular ejection fraction was calculated to be 22% with  stress 34% at rest. TID was  present.    KAYLA LION MD

## 2018-06-14 NOTE — PROGRESS NOTES
Routine EEG  completed at patient's bedside. Procedure explained to patient prior to testing.   Ordered by [employer].

## 2018-06-14 NOTE — PROGRESS NOTES
[facility]    Hospitalist Progress Note    Assessment & Plan   Summary of Stay: Bj Ponce is a 88 year old male who was admitted on 6/11/2018.   He has a history of coronary artery disease, atrial fibrillation, CKD stage IV, cardiomyopathy, anxiety and 1-2 month of increasing falls versus syncope, who was admitted directly from clinic for evaluation of recurrent falls.      Recurrent falls  -  Neurologic versus cardiac etiology.  Cardiology had been following patient.  Appreciate input.   - had abnormal stress test however given his frequent falls, advanced disease and chronic kidney disease cardiology recommending medical management at this point.  Patient currently is chest pain-free.  EP recommending getting discharged with event monitor.   Outpatient follow-up with cardiology and EP  -Patient did have episodes of somnolence and patient and family worried about narcolepsy getting worse lately.  Will consult neurology.  Discussed with neurology who is recommending EEG/MRI   -Also noticed that patient gets forgetful during conversation , we get neuropsych evaluate him, to evaluate for his cognition .    PT OT recommending TCU.    CVA 11/2017, no major residual:  - Has no focal neurological deficits but states has lost his strength ever since then which might be contributing to his recurrent falls; he was at [facility] following stroke; might need long-term nursing home placement  - He was started on a statin after his stroke in November; however, this was discontinued due to elevated LFTs     Chronic afib/flutter   CAD, ischemic cardiomyopathy:  -High CHADS-VASC score however not anticoagulation candidate secondary to frequent falls,.  EP following him and planning to follow-up in the clinic to discuss possible watchman procedure  - currently A. fib with controlled ventricular rate  - patient is currently chest pain-free ..  He however did have abnormal stress test and given his chronic  kidney disease frequent falls and other medical comorbidities cardiology recommending medical management at this point.  Continue torsemide, Lopressor and losartan, aspirin .  Statin discontinued for his elevated LFTs..  -Strict I's and O's and daily weight    Deconditioning:  -  Poor activity for several months since his CVA. PT OT recommending TCU     Anxiety:  -  Zoloft (This can cause falls but also helps his anxiety by his report).  Continue for now.  I doubt this is the primary cause of his falls.     CKD Stage IV:  -  Follow creatinine, not too far from baseline at the moment.     Pain assessment :  Current Pain Score 6/14/2018   Patient currently in pain? no   Pain score (0-10) 0   Bj s pain level was assessed and he currently denies pain.      DVT Prophylaxis: Pneumatic Compression Devices  Code Status: Full Code    Disposition: Expected discharge in  1-2 days  once cleared by neurology and placement found    Shayy Armas MD  Text page (7am - 6pm)    Interval History   Patient without any new complaints.  Discussed with daughter over the phone.  Patient started falling asleep/dozing off during conversation intermittently.  No new nursing concerns     -Data reviewed today: I reviewed all new labs and imaging results over the last 24 hours. I personally reviewed no images or EKG's today.    Physical Exam   Temp: 97.3  F (36.3  C) Temp src: Axillary BP: 147/86 Pulse: 65 Heart Rate: 70 Resp: 18 SpO2: 98 % O2 Device: None (Room air)    Vitals:    06/11/18 1700 06/13/18 0600   Weight: 56.2 kg (123 lb 12.8 oz) 56.6 kg (124 lb 12.5 oz)     Vital Signs with Ranges  Temp:  [95.4  F (35.2  C)-97.9  F (36.6  C)] 97.3  F (36.3  C)  Pulse:  [62-75] 65  Heart Rate:  [67-70] 70  Resp:  [16-18] 18  BP: (129-155)/(70-86) 147/86  SpO2:  [94 %-100 %] 98 %  I/O last 3 completed shifts:  In: 240 [P.O.:240]  Out: 200 [Urine:200]    Exam:  Constitutional: Awake, alert and no distress. Appears comfortable  Head:  Normocephalic. No masses, lesions, tenderness or abnormalities  ENT: no neck nodes or sinus tenderness  Cardiovascular: Irregular but rate controlled murmurs, no rub or gallop no JVD  Respiratory: Normal WOB, no wheezes or crackles   Gastrointestinal: Abdomen soft, non-tender. BS normal. No masses, organomegaly  : Deferred  Extremities: No edema , no clubbing /cyanosis   Skin: Warm and dry, no rash        Medications       aspirin  81 mg Oral Daily     losartan  50 mg Oral Daily     metoprolol tartrate  50 mg Oral BID     multivitamin, therapeutic with minerals  1 tablet Oral Daily     sertraline  50 mg Oral Daily     sodium chloride (PF)  3 mL Intracatheter Q8H     torsemide (DEMADEX) tablet 10 mg  10 mg Oral Daily       Data     Recent Labs  Lab 06/11/18  1745   WBC 5.7   HGB 10.7*   MCV 94      INR 1.20*      POTASSIUM 4.3   CHLORIDE 107   CO2 26   BUN 48*   CR 2.12*   ANIONGAP 9   GARETT 9.3   GLC 94       Imaging:  Recent Results (from the past 24 hour(s))   NM MPI w Lexiscan    Narrative    GATED MYOCARDIAL PERFUSION SCINTIGRAPHY WITH INTRAVENOUS PHARMACOLOGIC  VASODILATATION LEXISCAN -ONE DAY STUDY     6/13/2018 2:54 PM  DEMI BAILEY  88 years  Male  10/6/1929.    Indication/Clinical History: Ischemic cardiomyopathy status post  coronary bypass surgery with atrial fibrillation    Impression  1.  Myocardial perfusion imaging using single isotope technique  demonstrated small to moderate size partly reversible  inferior/inferolateral defect extending from the base to the apex  consistent with small nontransmural infarct with mild-to-moderate  monica-infarct ischemia extending to the apex. Specificity maybe  affected by significant diaphragm attenuation/bowel uptake artifact.  No other significant ischemia or infarct noted..   2. Gated images demonstrated moderately dilated left ventricle with  severe decrease in overall contractility with severe global  hypokinesis and akinesis of the inferolateral  wall at the base..  The  left ventricular systolic function is severely reduced with ejection  fraction of 22%.  3. Compared to the prior study from 3/17/2005 there is mild to  moderate ischemia in the area of the previously described  inferolateral scar .    Procedure  Pharmacologic stress testing was performed with Lexiscan at a rate of  0.08 mg/ml rapid bolus injection, for 15 seconds, 0.4 mg/5ml  intravenously. Low-level exercise was not performed along with the  vasodilator infusion.  The heart rate was 67 at baseline and estiven to  65 beats per minute during the Lexiscan infusion. The rest blood  pressure was 152/70 mmHg and was 134/76 mm Hg during Lexiscan  infusion. The patient experienced shortness of breath  during the  test.    Myocardial perfusion imaging was performed at rest, approximately 45  minutes after the injection intravenously of 8.0 mCi of Tc-99m  Myoview. At peak pharmacologic effect, 10-20 seconds after Lexiscan,   the patient was injected intravenously with 26.0 mCi of  Tc-99m  Myoview. The post-stress tomographic imaging was performed  approximately 60 minutes after stress.    EKG Findings  The resting EKG demonstrated atrial fibrillation with evidence of old  inferolateral infarct with isolated ventricular ectopy and nonspecific  ST-T wave changes. The stress EKG demonstrated atrial fibrillation  with old inferolateral infarct and no significant ST depression.    Tomographic Findings  Overall, the study quality is fair with significant diaphragm  attenuation/bowel uptake artifact . On the stress images, there is a  some small to moderate size mild inferior/inferolateral defect  extending from the base towards the apex. On the rest images, there is  a small mild inferolateral defect at the base with mild decrease in  apical activity . Gated images demonstrated moderately dilated left  ventricle with severe decrease in overall contractility with severe  global hypokinesis and akinesis of the  inferolateral wall at the base.  The left ventricular ejection fraction was calculated to be 22% with  stress 34% at rest. TID was present.    KAYLA LION MD

## 2018-06-14 NOTE — TELEPHONE ENCOUNTER
Please call patient tomorrow.  I tried her today and she could not speak as she was busy.    Please let her know I am out of town until next week.   As she knows, her dad is in the hospital and being evaluated for her recurrent falls and ?syncope vs neurologic issue.  Cardiology and neurology is seeing him and doing more tests so no answers right now.  I am aware of safety issues and need to probably go to nursing home on dc.  I will be back Tuesday.  Since he is in hospital the [facility] doctor is probably the best for her to speak to for now.    Thanks    Azar Zaldivar M.D.

## 2018-06-14 NOTE — TELEPHONE ENCOUNTER
LINCOLN   I called Alexandra and explained the difference between the HCD and the consent to communicate permission that we need. She was understanding of this, and is aware we need permission from Jayden. I was asked to contact the nursing staff on 6th floor which I did - but the RN I spoke to states they don't have the form needed, and asked to get one to them. I will walk form over to patient and see if he is willing to sign and will document when this is done.

## 2018-06-14 NOTE — TELEPHONE ENCOUNTER
Consent to communicate has been obtained for all of Jayden's children - Alexandra Petty - [phone], Paulo Rodas - [phone] and Roberto Ponce - [phone]. Okay for all information to be shared.

## 2018-06-15 NOTE — PLAN OF CARE
Problem: Patient Care Overview  Goal: Plan of Care/Patient Progress Review  Outcome: Improving  Pt A&Ox3. VSS. Tele A flutter.  Nodding off from time to time. Pt c/o no sleep last night. On RA. Fair appetite and oral intake. Up with 1PA , walker, and belt. Pt sat up in the chair for dinner and through evening. Mepilex on R elbow and coccyx. Scratches and scabs along back and scalp. Down to MRI this evening. Bed and chair alarm for safety. Rounded of frequently.

## 2018-06-15 NOTE — PLAN OF CARE
Problem: Patient Care Overview  Goal: Plan of Care/Patient Progress Review  Outcome: No Change  A&O x3, d/o situation. VSS on RA. A1 with walker. Tele - A flutter with variable. Pt unable to sleep, offered meds but refused. Foam dressing to coccyx and R elbow, CDI. Scratches and scabs along back and scalp. WOC/CC/Neuro/Cardio follow.

## 2018-06-15 NOTE — PROGRESS NOTES
[facility]    Hospitalist Progress Note    Interval History   - Partner Mingo at bedside  - Jayden alert and oriented but did fall asleep several times during my conversation. As he would start to doze off, I would call his name and he would very reliable instantly perk back up.    Assessment & Plan   Summary:Bj Ponce is a 88 year old male who was admitted on 6/11/2018.   He has a history of coronary artery disease, atrial fibrillation, CKD stage IV, cardiomyopathy, anxiety and 1-2 month of increasing falls versus syncope, who was admitted directly from clinic for evaluation of recurrent falls. Noted to be falling asleep frequently while inpatient. Evaluated by both neurology and cardiology without any clear neurological or cardiac related cause to his sleeping spells. Oximetry study overnight on 6/15-6/16.      Recurrent falls vs syncope  Possible sleeping disorder  Neurology and cardiology both consulted, no clear neurologic or cardiac etiology. Had abnormal stress test however given his frequent falls, advanced disease and chronic kidney disease cardiology recommending medical management at this point.  Patient did have episodes of somnolence and patient and family worried about narcolepsy getting worse lately. Neurology consulted, normal EEG and no acute changes on MRI.   Patient able to be awoken from his sleeping spells so it appears narcolepsy is less likely and a sleep disorder more likely.  - EP recommending getting discharged with event monitor, f/u with EP  - Neurology recommends sleep study  - PT OT recommending TCU  - Discussed with sleep center MD--will get oximetry study and see whether patient can discharge to sleep center for sleep study, followed by going to TCU afterwards     Deconditioning: Poor activity for several months since his CVA. PT OT recommending TCU    Chronic/Stable  CVA 11/2017, no major residual: Has no focal neurological deficits but states has lost his  strength ever since then which might be contributing to his recurrent falls; he was at [facility] TCU following stroke; might need long-term nursing home placement. He was started on a statin after his stroke in November; however, this was discontinued due to elevated LFTs      Chronic afib/flutter  CAD, ischemic cardiomyopathy:  -High CHADS-VASC score however not anticoagulation candidate secondary to frequent falls. EP following him and planning to follow-up in the clinic to discuss possible watchman procedure. Currently A. fib with controlled ventricular rate. Noted abnormal stress test and given his chronic kidney disease frequent falls and other medical comorbidities cardiology recommending medical management at this point.    - Continue torsemide, Lopressor and losartan, aspirin .  Statin discontinued for his elevated LFTs..      Anxiety: Continue Zoloft. Unlikely to be primary cause of patient's falls.  CKD4: Near baseline Cr 2.1.    DVT Prophylaxis: Pneumatic Compression Devices  Code Status: Full Code  PT/OT: Ordered    Disposition: Expected discharge in 1-2 days pending oximetry study    Pa Upton MD  Text Page  (7am to 6pm)  -Data reviewed today: I reviewed all new labs and imaging results over the last 24 hours.    # Pain Assessment:  Current Pain Score 6/15/2018   Patient currently in pain? denies   Pain score (0-10) -   Bj churchill pain level was assessed and he currently denies pain.        Physical Exam   Temp: 95.5  F (35.3  C) (done with portable- room not reading) Temp src: Oral BP: 148/83 Pulse: 68 Heart Rate: 59 Resp: 18 SpO2: 99 % O2 Device: None (Room air)    Vitals:    06/11/18 1700 06/13/18 0600   Weight: 56.2 kg (123 lb 12.8 oz) 56.6 kg (124 lb 12.5 oz)     Vital Signs with Ranges  Temp:  [95.5  F (35.3  C)-98.1  F (36.7  C)] 95.5  F (35.3  C)  Pulse:  [68] 68  Heart Rate:  [59-75] 59  Resp:  [18] 18  BP: (120-148)/(66-93) 148/83  SpO2:  [92 %-99 %] 99 %     O2 requirements:  None    Constitutional: Male in NAD  HEENT: Eyes nonicteric, oral mucosa moist  Cardiovascular: RRR, normal S1/2, no m/r/g  Respiratory: CTAB, no wheezing or crackles  Vascular: 1-2+ LE pitting edema, noted distal pedal pulses  GI: Normoactive bowel sounds, nontender, nondistended  Skin/Integumen: No rashes  Neuro/Psych: Appropriate affect and mood. A&Ox3, moves all extremities. Falls asleep intermittently but can be woken up with voice each time.    Medications       aspirin  81 mg Oral Daily     losartan  50 mg Oral Daily     metoprolol tartrate  50 mg Oral BID     multivitamin, therapeutic with minerals  1 tablet Oral Daily     sertraline  50 mg Oral Daily     sodium chloride (PF)  3 mL Intracatheter Q8H     torsemide (DEMADEX) tablet 10 mg  10 mg Oral Daily       Data     Recent Labs  Lab 06/14/18  1218 06/14/18  0915 06/11/18  1745   WBC 6.3  --  5.7   HGB 10.9*  --  10.7*   MCV 95  --  94     --  219   INR  --   --  1.20*   NA  --  141 142   POTASSIUM  --  4.9 4.3   CHLORIDE  --  108 107   CO2  --  26 26   BUN  --  47* 48*   CR  --  2.11* 2.12*   ANIONGAP  --  7 9   GARETT  --  9.4 9.3   GLC  --  92 94       Imaging:   Recent Results (from the past 24 hour(s))   MR Brain w/o Contrast    Narrative    MRI BRAIN WITHOUT CONTRAST  6/14/2018 11:06 PM    HISTORY:  Syncopal spells.     TECHNIQUE:  Multiplanar, multisequence MRI of the brain without  gadolinium IV contrast material.    COMPARISON:  CT scan 6/11/2018.    FINDINGS:  There is generalized atrophy of the brain. White matter  changes are seen in the cerebral hemispheres consistent with sequelae  of small vessel ischemic disease. Focal paramagnetic effect is seen in  the right paramedian christy consistent with an old hemorrhage. There is  no evidence of acute hemorrhage, infarct or mass. Mild  encephalomalacia and gliosis is seen in the occipital lobes  bilaterally.    There is an intraocular implant in the left globe. Otherwise facial  structures appear  normal. The arteries at the base of the brain and  the dural venous sinuses appear patent.      Impression    IMPRESSION:    1. No acute abnormality.  2. Brain atrophy and white matter changes consistent with sequelae of  small vessel ischemic disease.  3. Old hemorrhage in the right paramedian christy.  4. Encephalomalacia and gliosis in the occipital lobes.    DRU ALEXANDER MD

## 2018-06-15 NOTE — PROGRESS NOTES
"CLINICAL NUTRITION SERVICES - REASSESSMENT NOTE      Malnutrition (6/12):  % Weight Loss:  > 10% in 6 months (severe malnutrition)  % Intake:  <75% for >/= 3 months (non-severe malnutrition)  Subcutaneous Fat Loss:  Orbital region moderate depletion and Upper arm region moderate-severe depletion  Muscle Loss:  Temporal region severe depletion  Fluid Retention:  None noted     Malnutrition Diagnosis: Severe malnutrition  In Context of:  Acute on Chronic illness or disease       EVALUATION OF PROGRESS TOWARD GOALS   Diet: Regular (room service with assist)    Intake: Per patient this afternoon: Still no appetite, states that \"I used to eat just fine until a few days ago,\" and reports that he keeps falling asleep in front of his meals. Does not want nutrition supplements.  Per flow sheets: consuming % of 1-3 meals daily.    Last BM 6/14 (x 2)    NEW FINDINGS:   6/14: EEG = consistent with a mild encephalopathy   6/15: Psychology consult: Mild neurocognitive disorder, unspecified etiology     Vitals:    06/11/18 1700 06/13/18 0600   Weight: 56.2 kg (123 lb 12.8 oz) 56.6 kg (124 lb 12.5 oz)       Previous Goals:   Pt to consume >50% of meals BID  Evaluation: No met    Previous Nutrition Diagnosis:   Inadequate oral intake related to reliance on living facility for all meals as evidenced by weight loss of >10% over 6 months with moderate to severe fat and muscle losses  Evaluation: No change      CURRENT NUTRITION DIAGNOSIS  Inadequate oral intake related to decreased appetite and lethargy/drowsiness as evidenced by weight loss of >10% over 6 months with moderate to severe fat and muscle losses.     INTERVENTIONS  Recommendations / Nutrition Prescription  Continue diet as tolerated, encouraging PO intake     Implementation  None at this time.     Goals  Pt to consume >50% of meals BID.      MONITORING AND EVALUATION:  Progress towards goals will be monitored and evaluated per protocol and Practice " Guidelines          Adilene Diggs, Dietitian

## 2018-06-15 NOTE — PROGRESS NOTES
[facility]  Neurology Daily Note      Admission Date:6/11/2018   Date of service: 06/15/2018   Hospital Day: 5      Assessment & Plan   _______________________________  #. (R55) Syncope, unspecified syncope type  --CT head negative for acute abnormality  --appears to fall asleep while conversing - he is not always aware  --MRI brain unremarkable  --EEG negative for seizures, slowed/encephalopathic  --would recommend evaluation after discharge with a sleep provider for further evaluation and possibly sleep study  #. (R29.6) Recurrent falls  (primary encounter diagnosis)  --management per primary service  #. (I48.2) Permanent atrial fibrillation (H)  --management per hospitalist & cardiology  #. DVT Prophylaxis  --per primary service  #. PT/OT/Speech  --continue evaluations  #. Nutrition / GI Prophylaxis  --Per recommendations of speech therapy    Code Status: Full Code    Disposition: per primary service  No further neurological workup recommended at this time. Will need to be set up with sleep clinic after discharge for further evaluation    Interval History   _______________________________  Patient presented with recurrent falls. He has had several ED visits and short hospital stays. Multiple times when patient just falls over, no warning, including while seated on a chair or the toilet. Cardiology evaluated and recommend event monitor at discharge. On exam, no focal neurological findings. Patient does fall asleep during conversation a few times. MRI brain negative for acute abnormality. EEG was abnormal, but no seizures noted. Patient did not like being attached to the EEG machine and not being able to move.  Today, no significant change. Still falling asleep during conversations. Patient not aware he is doing so.     Review of Systems   _______________________________  The Review of Systems is negative other than noted in the HPI  Physical Exam   _______________________________  Vitals: Temp:  98.1  F (36.7  C) Temp src: Oral BP: (!) 141/93 Pulse: 68 Heart Rate: 75 Resp: 18 SpO2: 92 % O2 Device: None (Room air)    Vital Signs with Ranges: Temp:  [97.6  F (36.4  C)-98.1  F (36.7  C)] 98.1  F (36.7  C)  Pulse:  [68] 68  Heart Rate:  [67-75] 75  Resp:  [18] 18  BP: (120-141)/(66-93) 141/93  SpO2:  [92 %] 92 %    General Appearance:  No acute distress  Neuro:       Mental Status Exam:   Awake, alert, oriented X3. Periodically falls asleep during conversation. Speech and language are intact. Mental status is normal       Cranial Nerves:  Pupils 3 mm, reactive. EOMI. Face sensation is normal. Face is symmetric. Tongue and uvula are midline. Other CN are normal           Motor:  5/5 X 4. Tone and bulk are normal           Reflexes:  Normal DTR. Toes downgoing.        Sensory:  Normal to light touch               Coordination:   Intact finger-to-nose        Gait:  Up with assistance  Abdomen: Soft, not tender, not distended  Extremities: No clubbing, no cyanosis, no edema    Medications   _______________________________      aspirin  81 mg Oral Daily     losartan  50 mg Oral Daily     metoprolol tartrate  50 mg Oral BID     multivitamin, therapeutic with minerals  1 tablet Oral Daily     sertraline  50 mg Oral Daily     sodium chloride (PF)  3 mL Intracatheter Q8H     torsemide (DEMADEX) tablet 10 mg  10 mg Oral Daily       Data   _______________________________      Lab Data:   All data was reviewed by me personally  CBC RESULTS:  Recent Labs   Lab Test  06/14/18   1218  06/11/18   1745  06/03/18   1230   WBC  6.3  5.7  6.1   RBC  3.55*  3.50*  3.78*   HGB  10.9*  10.7*  11.7*   HCT  33.8*  32.8*  35.7*   PLT  235  219  249     Basic Metabolic Panel:  Recent Labs   Lab Test  06/14/18   0915  06/11/18   1745  06/03/18   1230   NA  141  142  142   POTASSIUM  4.9  4.3  4.6   CHLORIDE  108  107  109   CO2  26  26  28   BUN  47*  48*  49*   CR  2.11*  2.12*  1.95*   GLC  92  94  96   GARETT  9.4  9.3  9.6     Liver  panel:  Recent Labs   Lab Test  01/25/18   1449 12/12/17 12/06/16   0936   PROTTOTAL  6.9   --   7.5   ALBUMIN  2.4*   --   3.0*   BILITOTAL  0.7   --   0.6   ALKPHOS  227*   --   182*   AST  43  34  34   ALT  25  40  38     Coagulation  Recent Labs   Lab Test  06/11/18   1745  06/03/18   1230 05/18/18 05/08/18 04/24/18   INR  1.20*  1.49*  2.8*  2.5*  3.7*      Lipid Profile:  Recent Labs   Lab Test  12/06/16   0936  10/05/15   0858  08/05/14   0819   CHOL  151  161  142   HDL  50  54  53   LDL  78  82  64   TRIG  115  123  124   CHOLHDLRATIO   --   3.0  2.7     Thyroid Panel:  Recent Labs   Lab Test  06/11/18   1745  01/25/18   1449  10/05/15   0858   TSH  2.17  2.42  1.93      UA Results:  Recent Labs   Lab Test  06/11/18   1925   COLOR  Yellow   APPEARANCE  Clear   URINEGLC  Negative   URINEBILI  Negative   URINEKETONE  Negative   SG  1.011   UBLD  Negative   URINEPH  6.0   PROTEIN  100*   NITRITE  Negative   LEUKEST  Small*   RBCU  <1   WBCU  12*        Cardiac US:   Left ventricular systolic function is moderately reduced. The visual ejection fraction is estimated at 30-40%. There is moderate global hypokinesia of the left ventricle. There is inferolateral wall akinesis. There is moderate biatrial enlargement. Immobile mitral valve posterior leaflet is responsible for the jet of moderate to mod-severe (2-3+ or 3+) mitral regurgitation. There is at least moderate (2+) tricuspid regurgitation. Right ventricular systolic pressure is elevated, consistent with moderate to severe pulmonary hypertension. The ascending aorta is mildly dilated. There is mild to moderate (1-2+) aortic Regurgitation. The rhythm was atrial fibrillation. Compared to the prior echo, there has been a marked increase in mitral and tricuspid insufficiency.       Neurophysiology:   EEG 6/14/18:  This 1-hour video EEG in the awake state appears to be abnormal.  There is some intermittent generalized slowing which is consistent with a mild  encephalopathy.  It is important to note the patient is moving around constantly during the recording and he was oriented and able to answer questions, so it is difficult to tell if some of the slowing is movement artifact or actual cerebral activity, but it was seen throughout the recording.  I suspect that it is background slowing.  Otherwise, the patient does have a well-formed parietooccipital rhythm.  No electrographic seizures or epileptiform discharges were seen.        Imaging:   All imaging studies were reviewed personally  CT head 6/11/18:   Diffuse cerebral volume loss and cerebral white matter changes consistent with chronic small vessel ischemic disease. No evidence for acute intracranial pathology.    MRI brain 6/14/18:   1. No acute abnormality.  2. Brain atrophy and white matter changes consistent with sequelae of small vessel ischemic disease.  3. Old hemorrhage in the right paramedian christy.  4. Encephalomalacia and gliosis in the occipital lobes.        Text Page    Iona Ghotra, MSN, FNP-BC, RN CNRN

## 2018-06-15 NOTE — PLAN OF CARE
Problem: Patient Care Overview  Goal: Plan of Care/Patient Progress Review  Outcome: No Change  Pt A&Ox4, forgetful with frequent periods of falling asleep during conversation or while sitting in chair. VSS, telemetry shows atrial flutter. Blanchable redness on coccyx with new barrier applied, R elbow abrasion cleaned with new barrier applied. Generalized weakness with activity, up with 1 with walker/gait belt, becomes SOB with exertion, reports feelings anxious when SOB. EEG and MRI done yesterday.  neuropsychology/electrophysiology/cardiology following. Continue to monitor.

## 2018-06-15 NOTE — PROGRESS NOTES
Preliminary Video EEG impression on June 14, 2018  for 60 minutes.  Full report to follow. Please look in inpatient chart, under procedure section.     This EEG is abnormal due to the presences of rare intermittent generalized theta slowing,please note patient was moving a lot during the recording so it was hard to tell if some of this was just artifact.     There is a  7-8 hz symmetric parieto-occipital rhythm. This EEG is consistent with a mild encephalopathy. No epileptiform discharges or electrographic seizures were seen in this record. Clinical correlation is advised.     Randi Farah MD  Staff Epilepsy Neurologist   [phone]

## 2018-06-15 NOTE — PROGRESS NOTES
Spoke with RT. RT to set up overnight oximetry study between 2100 and 2130 [facility].

## 2018-06-15 NOTE — TELEPHONE ENCOUNTER
Left another message for Alexandra     Patient reps - if she again calls back ok to read below message from PCP - if she has any further questions triage can speak with her    Thank you  Janice NORRIS RN

## 2018-06-16 NOTE — PLAN OF CARE
Problem: Patient Care Overview  Goal: Plan of Care/Patient Progress Review  Outcome: No Change  A&Ox4, VSS on RA. Tele A-fib/A-flutter with pedrito, min HR 52. Up A1/walker. Fall in BR this AM, reported head strike. C/o R-sided shoulder, elbow, knee and hip pain (resolved). Head CT and Xrays -ve. Daughter, Alexandra updated via phone. Regular diet with poor appetite. LS diminished in R. Denies SOB. Mepilex to coccyx (reddened, mild excoriation). T/R Q2 in bed. Up in recliner x2 this shift. +BM small, soft/loose. Voiding well. Visiting with friend, Mingo, at bedside. Plans for overnight oxymetry. D/C pending progress.

## 2018-06-16 NOTE — PROGRESS NOTES
Called daughter Alexandra to update on fall, left voice message requesting call back. Did speak with friend, Mingo and updated on status and fall. Mingo will attempt to call Alexandra as well.

## 2018-06-16 NOTE — PROGRESS NOTES
Flying Sidhu RN paged to restart PIV. IV access lost and needs PIV as patient is on tele. Patient has poor veins.

## 2018-06-16 NOTE — CODE/RAPID RESPONSE
"[facility]    [facility] Fall Note  6/16/2018   Time Called: 0834    [facility] called for: fall    Assessment & Plan     Unwitnessed mechanical fall in setting of frequent falls not on anticoagulation, deconditioning, possible sleep disorder:   - Upon arrival, pt lying in fetal position on R side on bathroom floor.  Pt was assisted to bathroom, and then he independently stood up after using restroom, \"lost his balance\", \"knees gave out\", reportedly then fell onto knees and subsequently R side hitting R hip, R elbow and then R side of head.  Pt reports no loss of consciousness, is able to recall fall in entirety.  Pt reports no dizziness or palpitations.  Pt reports R elbow, R hip and R side of head pain.  While on floor, vitals were obtained noting SBP 130s, HR 60s, in no apparent distress.  Pt's C-spines were ruled out by pt reporting no cervical pain to palpation, able to perform full ROM exercises without pain, and no radiculopathy noted.  Pt reporting no spine tenderness to palpation.  Pt carefully assisted by several staff members onto hard board, and lifting onto bed.  No difference noted in BLE lengths, no internal/external rotation noted of RLE.  Pt able to bend R knee without any pain noted.  Pt able to lift RLE off bed without difficulty.  Pt able to bend and rotate R elbow without difficulty.  New skin tear noted near styloid process of R ulna, mepilex placed.  While on cart, pt's VS SBP 160s, HR 60s, O2 sats >94% RA, RR 10s.     INTERVENTIONS:  - Stat head CT without contrast  - Stat R elbow and R hip imaging  - Ensure fall precautions  - Remain with pt at all times when in bathroom, ensure chair alarm when up in chair, and bed alarm while in bed  - Pt has been worked up by neurology and cardiology recommending sleep study and event monitoring; will defer any further work up to hospitalist.  Fall appears to be due to loss of balance, weakness/deconditioning.      Discussed with and defer " further cares to nursing and Dr. Pa Upton.    Interval History     Bj Ponce is a 88 year old male who was admitted on 6/11/2018 for increasing falls vs syncope.    Medical history significant for: CAD, a-fib not on anticoagulation, CKD IV, cardiomyopathy, anxiety    Code Status: Full Code    Clement Ulloa, APRN, CNP  [facility]    Allergies   No Known Allergies    Physical Exam   Vital Signs with Ranges:  Temp:  [95.5  F (35.3  C)-97.5  F (36.4  C)] 97.5  F (36.4  C)  Pulse:  [65] 65  Heart Rate:  [59-70] 62  Resp:  [18] 18  BP: (138-148)/(78-83) 146/81  SpO2:  [95 %-99 %] 95 %     Constitutional: Pt lying on R side in fetal position on bathroom floor, awake, alert, in no obvious distress, thin appearing male  Neck: No cervical tenderness to palpation, full ROM without difficulty  Pulmonary: In no apparent distress, symmetrical chest rise noted  Cardiovascular: Appears well perfused  GI: Nondistended  Skin/Integumen: Warm and dry, scattered different healing staged ecchymosis/abrasions throughout, new skin tear noted on R ulna styloid process ~ 2 cm x 1.5 cm  Neuro: A/O, PERRL, moving all extremities weakly, no ecchymosis/hematoma noted on R side of head  Psych:  Mentation appears normal and affect normal  Extremities: Moving all extremities weakly, no gross bony deformities noted, able to slowly perform full ROM in RUE/RLE     Data     IMAGING: (X-ray/CT/MRI)   Recent Results (from the past 24 hour(s))   CT Head w/o Contrast    Narrative    CT OF THE HEAD WITHOUT CONTRAST 6/16/2018 9:42 AM     COMPARISON: Brain MR 6/14/2018.    HISTORY: Fall, hit head.    TECHNIQUE: 5 mm thick axial CT images of the head were acquired  without IV contrast material.    FINDINGS:  There is moderate diffuse cerebral volume loss. There are  subtle patchy areas of decreased density in the cerebral white matter  bilaterally that are consistent with sequela of chronic small vessel  ischemic disease.     The ventricles and basal  cisterns are within normal limits in  configuration given the degree of cerebral volume loss.  There is no  midline shift. There are no extra-axial fluid collections.     No intracranial hemorrhage, mass or recent infarct.    The visualized paranasal sinuses are well-aerated. There is no  mastoiditis. There are no fractures of the visualized bones.      Impression    IMPRESSION:  Diffuse cerebral volume loss and cerebral white matter  changes consistent with chronic small vessel ischemic disease. No  evidence for acute intracranial pathology.       Radiation dose for this scan was reduced using automated exposure  control, adjustment of the mA and/or kV according to patient size, or  iterative reconstruction technique.   XR Pelvis w Hip Right 1 View    Narrative    PELVIS AND HIP RIGHT ONE VIEW  6/16/2018 10:00 AM     COMPARISON: None.    HISTORY: Fall, right hip pain.     FINDINGS: The visualized bones and joint spaces are within normal  limits.      Impression    IMPRESSION: No evidence for fracture, dislocation or significant  degenerative change of the pelvis or right hip.   XR Elbow Right G/E 3 Views    Narrative    RIGHT ELBOW THREE OR MORE VIEWS  6/16/2018 10:01 AM     COMPARISON: None.    HISTORY: Fall, hit right elbow.      Impression    IMPRESSION: There is osteophytic spurring of the radial head. No  fractures noted. No significant right elbow joint effusion.     Time Spent on this Encounter   I spent 35 minutes on the unit/floor managing the care of Bj Ponec. Over 50% of my time was spent counseling the patient and/or coordinating care regarding services listed in this note.

## 2018-06-16 NOTE — PLAN OF CARE
Problem: Patient Care Overview  Goal: Plan of Care/Patient Progress Review  Outcome: No Change  Patient is alert, forgetful.  Up with assist of one and walker/gait belt.  Denies pain.  Patient refused to wear telemetry and overnight pulse oximetry, stated he wanted to post pone until tomorrow night.

## 2018-06-16 NOTE — PROGRESS NOTES
[facility]    Hospitalist Progress Note    Interval History   - Patient fell in bathroom this morning on his right side, no LOC, XR and CT without evidence of fracture. Patient does not completely remember what happened but endorses  - Patient reports declining oximetry study due to faulty hardware. He then described something about the plastic melting on the hardware. I discussed with him that we would repeat the oximetry study again today, but if it cannot be done, then these studies may need to be deferred to the outpatient setting  - Continues to have sleeping spells during my conversation--easily arousable    Assessment & Plan   Summary:Bj Ponce is a 88 year old male who was admitted on 6/11/2018.   He has a history of coronary artery disease, atrial fibrillation, CKD stage IV, cardiomyopathy, anxiety and 1-2 month of increasing falls versus syncope, who was admitted directly from clinic for evaluation of recurrent falls. Noted to be falling asleep frequently while inpatient. Evaluated by both neurology and cardiology without any clear neurological or cardiac related cause to his sleeping spells. Noted fall 6/16 while in bathroom, probably due to a sleeping/syncopal spell. Oximetry study planned 6/16-6/17.      Recurrent falls vs syncope  Possible sleeping disorder  Neurology and cardiology both consulted, no clear neurologic or cardiac etiology. Had abnormal stress test however given his frequent falls, advanced disease and chronic kidney disease cardiology recommending medical management at this point.  Patient did have episodes of somnolence and patient and family worried about narcolepsy getting worse lately. Neurology consulted, normal EEG and no acute changes on MRI.   Patient able to be awoken from his sleeping spells so it appears narcolepsy is less likely and a sleep disorder more likely.  - EP recommending getting discharged with event monitor, f/u with EP  - Neurology  recommends sleep study  - PT OT recommending TCU  - Discussed with sleep center MD--will repeat oximetry study again and see whether patient can discharge to sleep center for sleep study, followed by going to TCU afterwards     Deconditioning: Poor activity for several months since his CVA. PT OT recommending TCU    Chronic/Stable  CVA 11/2017, no major residual: Has no focal neurological deficits but states has lost his strength ever since then which might be contributing to his recurrent falls; he was at [facility] following stroke; might need long-term nursing home placement. He was started on a statin after his stroke in November; however, this was discontinued due to elevated LFTs      Chronic afib/flutter  CAD, ischemic cardiomyopathy:  -High CHADS-VASC score however not anticoagulation candidate secondary to frequent falls. EP following him and planning to follow-up in the clinic to discuss possible watchman procedure. Currently A. fib with controlled ventricular rate. Noted abnormal stress test and given his chronic kidney disease frequent falls and other medical comorbidities cardiology recommending medical management at this point.    - Continue torsemide, Lopressor and losartan, aspirin .  Statin discontinued for his elevated LFTs..      Anxiety: Continue Zoloft. Unlikely to be primary cause of patient's falls.  CKD4: Near baseline Cr 2.1.    DVT Prophylaxis: Pneumatic Compression Devices  Code Status: Full Code  PT/OT: Ordered    Disposition: Expected discharge in 1-2 days pending oximetry study    Pa Upton MD  Text Page  (7am to 6pm)  -Data reviewed today: I reviewed all new labs and imaging results over the last 24 hours.    # Pain Assessment:  Current Pain Score 6/16/2018   Patient currently in pain? denies   Pain score (0-10) -   Bj churchill pain level was assessed and he currently denies pain.        Physical Exam   Temp: 97.5  F (36.4  C) Temp src: Oral BP: 146/81 Pulse: 65 Heart  Rate: 62 Resp: 18 SpO2: 95 % O2 Device: None (Room air)    Vitals:    06/11/18 1700 06/13/18 0600   Weight: 56.2 kg (123 lb 12.8 oz) 56.6 kg (124 lb 12.5 oz)     Vital Signs with Ranges  Temp:  [95.5  F (35.3  C)-97.5  F (36.4  C)] 97.5  F (36.4  C)  Pulse:  [65] 65  Heart Rate:  [59-70] 62  Resp:  [18] 18  BP: (138-148)/(78-83) 146/81  SpO2:  [95 %-99 %] 95 %     O2 requirements: None    Constitutional: Male in NAD  HEENT: Eyes nonicteric, oral mucosa moist  Cardiovascular: RRR, normal S1/2, no m/r/g  Respiratory: CTAB, no wheezing or crackles  Vascular: 1-2+ LE pitting edema, noted distal pedal pulses  GI: Normoactive bowel sounds, nontender, nondistended  Skin/Integumen: Notable bruising in UE and LE, including skin tears over R elbow and wrist where patient fell on 6/16  Neuro/Psych: Appropriate affect and mood. A&Ox3, moves all extremities. Falls asleep mid-sentence but can be woken up with voice each time.    Medications       aspirin  81 mg Oral Daily     losartan  50 mg Oral Daily     metoprolol tartrate  50 mg Oral BID     multivitamin, therapeutic with minerals  1 tablet Oral Daily     sertraline  50 mg Oral Daily     sodium chloride (PF)  3 mL Intracatheter Q8H     torsemide (DEMADEX) tablet 10 mg  10 mg Oral Daily       Data     Recent Labs  Lab 06/14/18  1218 06/14/18  0915 06/11/18  1745   WBC 6.3  --  5.7   HGB 10.9*  --  10.7*   MCV 95  --  94     --  219   INR  --   --  1.20*   NA  --  141 142   POTASSIUM  --  4.9 4.3   CHLORIDE  --  108 107   CO2  --  26 26   BUN  --  47* 48*   CR  --  2.11* 2.12*   ANIONGAP  --  7 9   GARETT  --  9.4 9.3   GLC  --  92 94       Imaging:   Recent Results (from the past 24 hour(s))   CT Head w/o Contrast    Narrative    CT OF THE HEAD WITHOUT CONTRAST 6/16/2018 9:42 AM     COMPARISON: Brain MR 6/14/2018.    HISTORY: Fall, hit head.    TECHNIQUE: 5 mm thick axial CT images of the head were acquired  without IV contrast material.    FINDINGS:  There is moderate  diffuse cerebral volume loss. There are  subtle patchy areas of decreased density in the cerebral white matter  bilaterally that are consistent with sequela of chronic small vessel  ischemic disease.     The ventricles and basal cisterns are within normal limits in  configuration given the degree of cerebral volume loss.  There is no  midline shift. There are no extra-axial fluid collections.     No intracranial hemorrhage, mass or recent infarct.    The visualized paranasal sinuses are well-aerated. There is no  mastoiditis. There are no fractures of the visualized bones.      Impression    IMPRESSION:  Diffuse cerebral volume loss and cerebral white matter  changes consistent with chronic small vessel ischemic disease. No  evidence for acute intracranial pathology.       Radiation dose for this scan was reduced using automated exposure  control, adjustment of the mA and/or kV according to patient size, or  iterative reconstruction technique.   XR Pelvis w Hip Right 1 View    Narrative    PELVIS AND HIP RIGHT ONE VIEW  6/16/2018 10:00 AM     COMPARISON: None.    HISTORY: Fall, right hip pain.     FINDINGS: The visualized bones and joint spaces are within normal  limits.      Impression    IMPRESSION: No evidence for fracture, dislocation or significant  degenerative change of the pelvis or right hip.   XR Elbow Right G/E 3 Views    Narrative    RIGHT ELBOW THREE OR MORE VIEWS  6/16/2018 10:01 AM     COMPARISON: None.    HISTORY: Fall, hit right elbow.      Impression    IMPRESSION: There is osteophytic spurring of the radial head. No  fractures noted. No significant right elbow joint effusion.

## 2018-06-16 NOTE — PROGRESS NOTES
I had a long discussion with Alexandra (daughter) over the phone about her father's current situation. I discussed with her the Neurology and Cardiology's assessments and recommendations. Alexandra brought up several issues including orthostatic hypotension, polypharmacy, hallucinations related to lack of sleep which were answered. Alexandra's  also said that patient has had isues with falling asleep in the chair for years before his current spells of falling asleep frequently.    Will continue to proceed with nighttime oximetry study today and speak with sleep center tomorrow.    Pa Upton

## 2018-06-16 NOTE — PROGRESS NOTES
2nd attempt to contact daughter, Alexandra, who is POA to update RE: fall. Left voice message requesting call back.

## 2018-06-16 NOTE — PROGRESS NOTES
"Patient refusing telemetry and to wearing overnight pulse oximetry, keeps stating that items are broken.  Patient ripped off finger probe x 2 and telemetry patches.  This writer and charge RN tried to explain to patient the importance of keeping these in place overnight.  Patient stating \"I am the patient and I want to post pone this until tomorrow night.\"    This writer unable to get telemetry strip for shift.  Overnight pulse oximetry was on until 0115.   "

## 2018-06-16 NOTE — PLAN OF CARE
Problem: Patient Care Overview  Goal: Plan of Care/Patient Progress Review  Outcome: No Change  A&Ox4. Forgetful at times. Frequent periods of falling asleep between cares. VSS on RA except bradycardic at times and elevated BP. Tele A. Flutter, variable. LS clear. Tolerating regular diet. Up with assist of 1, gait belt, and walker. Voiding adequately. Trace edema to RLE. Overnight oximetry study ordered for tonight. RT to set up. Dressing to R elbow is C/D/I. Scab to head and R knee, CIERRA. Abrasion to mid back, CIERRA. Coccyx covered with mepilex, C/D/I. EEG completed; Neurology following. Nursing will continue to monitor.

## 2018-06-17 NOTE — PROGRESS NOTES
Overnight study completed. overnight pulse Oximetry study was started with romm air, and was placed 1.5 lpm. Spo2 88-99%

## 2018-06-17 NOTE — PLAN OF CARE
Problem: Patient Care Overview  Goal: Plan of Care/Patient Progress Review  Outcome: No Change  Patient is oriented x 4 but very sleepy. Lung sounds diminished. Saturation fell to 88%-89% on room air when asleep. Was titrated up from   0.5 %-1.5% O2 via nasal canula with improvements in O2 saturation. Completed over night Oximetry study. Other VSS. Tele A-Fib with CVR. Denied pain. Dressings remain CDI. Will continue to monitor.

## 2018-06-17 NOTE — PLAN OF CARE
Problem: Patient Care Overview  Goal: Plan of Care/Patient Progress Review  Discharge Planner OT   Patient plan for discharge: unknown  Current status: Orders rec'd and chart reviewed. Per GAUDENCIO the plan is to d/c to TCU. SW asked for orders for PT while here to address mobility due to prolonged LOS.   Barriers to return to prior living situation: Defer to PT  Recommendations for discharge: Defer to PT  Rationale for recommendations: OT deferring eval to TCU at this time as LOS is expected to be short. OT order complete.        Entered by: Fabricio Thompson 06/17/2018 11:11 AM

## 2018-06-17 NOTE — PLAN OF CARE
Problem: Patient Care Overview  Goal: Plan of Care/Patient Progress Review  PT: New PT orders received, was initially evaluated while under OBS 6/12, when attempted to see pt, pt busy with another provider.

## 2018-06-17 NOTE — PROGRESS NOTES
[facility]    Hospitalist Progress Note    Interval History   - Alexandra daughter visiting from [place]  - Patient reports good sleep last night. Nighttime oximetry essentially normal  - PT/OT reordered today, possible discharge to TCU tomorrow    Assessment & Plan   Summary:Bj Ponce is a 88 year old male who was admitted on 6/11/2018.   He has a history of coronary artery disease, atrial fibrillation, CKD stage IV, cardiomyopathy, anxiety and 1-2 month of increasing falls versus syncope, who was admitted directly from clinic for evaluation of recurrent falls. Noted to be falling asleep frequently while inpatient. Evaluated by both neurology and cardiology without any clear neurological or cardiac related cause to his sleeping spells. Noted fall 6/16 while in bathroom, probably due to a sleeping/syncopal spell. Oximetry study 6/16-6/17 essentially normal.      Recurrent falls vs syncope  Possible sleeping disorder  Neurology and cardiology both consulted, no clear neurologic or cardiac etiology. Had abnormal stress test however given his frequent falls, advanced disease and chronic kidney disease cardiology recommending medical management at this point.  Patient did have episodes of somnolence and patient and family worried about narcolepsy getting worse lately. Neurology consulted, normal EEG and no acute changes on MRI.   Patient able to be awoken from his sleeping spells so it appears narcolepsy is less likely and a sleep disorder or lack of sleep more likely.  - EP recommending getting discharged with event monitor, f/u with EP  - Neurology recommends sleep study  - PT OT recommending TCU  - Discussed with sleep center MD--will repeat oximetry study again and see whether patient can discharge to sleep center for sleep study, followed by going to TCU afterwards. Dr. No [phone]     Deconditioning: Poor activity for several months since his CVA. PT OT recommending  TCU    Chronic/Stable  CVA 11/2017, no major residual: Has no focal neurological deficits but states has lost his strength ever since then which might be contributing to his recurrent falls; he was at [facility] following stroke; might need long-term nursing home placement. He was started on a statin after his stroke in November; however, this was discontinued due to elevated LFTs      Chronic afib/flutter  CAD, ischemic cardiomyopathy  High CHADS-VASC score however not anticoagulation candidate secondary to frequent falls. EP following him and planning to follow-up in the clinic to discuss possible watchman procedure. Echo 6/12 with EF 30-40% with WMA. Currently A. fib with controlled ventricular rate. Noted abnormal stress test and given his chronic kidney disease frequent falls and other medical comorbidities cardiology recommending medical management at this point.    - Continue torsemide, Lopressor and losartan, aspirin .  Statin discontinued for his elevated LFTs..      Anxiety: Continue Zoloft. Unlikely to be primary cause of patient's falls.  CKD4: Near baseline Cr 2.1.    DVT Prophylaxis: Pneumatic Compression Devices  Code Status: Full Code  PT/OT: Ordered    Disposition: Expected discharge tomorrow to TCU per PT    Pa Upton MD  Text Page  (7am to 6pm)  -Data reviewed today: I reviewed all new labs and imaging results over the last 24 hours.    # Pain Assessment:  Current Pain Score 6/17/2018   Patient currently in pain? denies   Pain score (0-10) -   Bj churchill pain level was assessed and he currently denies pain.        Physical Exam   Temp: 97.5  F (36.4  C) Temp src: Oral BP: 150/80   Heart Rate: 69 Resp: 16 SpO2: 97 % O2 Device: None (Room air) Oxygen Delivery: 1.5 LPM  Vitals:    06/11/18 1700 06/13/18 0600   Weight: 56.2 kg (123 lb 12.8 oz) 56.6 kg (124 lb 12.5 oz)     Vital Signs with Ranges  Temp:  [97.4  F (36.3  C)-98.1  F (36.7  C)] 97.5  F (36.4  C)  Heart Rate:  [61-77]  69  Resp:  [16-18] 16  BP: (136-150)/(69-80) 150/80  SpO2:  [88 %-99 %] 97 %  I/O last 3 completed shifts:  In: 120 [P.O.:120]  Out: -   O2 requirements: None    Constitutional: Male in NAD  HEENT: Eyes nonicteric, oral mucosa moist  Cardiovascular: RRR, normal S1/2, no m/r/g  Respiratory: CTAB, no wheezing or crackles  Vascular: 1-2+ LE pitting edema, noted distal pedal pulses  GI: Normoactive bowel sounds, nontender, nondistended  Skin/Integumen: Notable bruising in UE and LE, including skin tears over R elbow and wrist where patient fell on 6/16  Neuro/Psych: Appropriate affect and mood. A&Ox3, moves all extremities. Falls asleep mid-sentence but can be woken up with voice each time.    Medications       aspirin  81 mg Oral Daily     losartan  50 mg Oral Daily     metoprolol tartrate  50 mg Oral BID     multivitamin, therapeutic with minerals  1 tablet Oral Daily     sertraline  50 mg Oral Daily     sodium chloride (PF)  3 mL Intracatheter Q8H     torsemide (DEMADEX) tablet 10 mg  10 mg Oral Daily       Data     Recent Labs  Lab 06/17/18  0830 06/14/18  1218 06/14/18  0915 06/11/18  1745   WBC 5.5 6.3  --  5.7   HGB 11.1* 10.9*  --  10.7*   MCV 93 95  --  94    235  --  219   INR  --   --   --  1.20*     --  141 142   POTASSIUM 4.4  --  4.9 4.3   CHLORIDE 108  --  108 107   CO2 22  --  26 26   BUN 55*  --  47* 48*   CR 2.23*  --  2.11* 2.12*   ANIONGAP 10  --  7 9   GARETT 9.1  --  9.4 9.3   GLC 78  --  92 94       Imaging:   No results found for this or any previous visit (from the past 24 hour(s)).

## 2018-06-17 NOTE — PLAN OF CARE
Problem: Patient Care Overview  Goal: Plan of Care/Patient Progress Review  Outcome: No Change  VSS. A&Ox4. Denies pain. Voiding adequately. RA. SL. Up with 1, HU and walker. Tele Afib CVR. Will continue to monitor.

## 2018-06-17 NOTE — PLAN OF CARE
Problem: Patient Care Overview  Goal: Plan of Care/Patient Progress Review  Outcome: No Change  A&Ox4, sleepy and lethargic this AM with improvement as shift progressed. Up A1/W/GB, in recliner for meals. Regular diet with fair appetite this shift. VSS on RA, Tele A-fib CVR, pedrito. Daughter at bedside, in pleasant spirits this shift. Denies pain. PT/OT consulted, plans for TCU d/c pending therapy evals. DTV, bladder scan for 425cc, currently up to BR for voiding attempt. Nursing will continue to monitor.

## 2018-06-17 NOTE — PLAN OF CARE
Problem: Patient Care Overview  Goal: Plan of Care/Patient Progress Review  Outcome: No Change  A&Ox4. Forgetful at times. Periods of falling asleep during conversation. VSS on RA. Denies pain. Tele. Overnight oximetry study. LS diminished, posteriorly. Up with assist of 1, gait belt, and walker. Poor appetite, regular diet. Voiding adequately. R elbow covered with mepilex, new dressing C/D/I. Dressing to coccyx is C/D/I. Abrasion to head is scabbing. R knee abrasion is scabbing. Trace edema to RLE, elevated. IV SL. Nursing will continue to monitor.

## 2018-06-17 NOTE — PROGRESS NOTES
SW:  D:  Social Work is following for d/c planning.  Have spoken with daughter Alexandra twice and writer greatly appreciates the conversation Dr Upton had in updating her of patient's work up.  PT evaluated patient in OBS as their protocol however was not notified of patient's change to In Patient status and thus have not seen him since 6/12.  The anticipated d/c plan is to a TCU based on PT evaluation  and nursing notes reflecting patient's current mobility.    Writer has asked MD if he can re-order PT for while.    P: Will discuss TCU options with patient and keep daughter Alexandra updated.

## 2018-06-18 NOTE — PROGRESS NOTES
SW:  D:  Met with patient and daughter.  Patient was accepted at [facility] which was their preference.  Daughter transported patient.  Orders faxed before d/c.  PAS-RR    D: Per DHS regulation, SW completed and submitted PAS-RR to [place] [employer] Direct Connect via the Senior LinkAge Line.  PAS-RR confirmation # is : [id number]      I: SW spoke with patient and daughter and they are aware a PAS-RR has been submitted.  SW reviewed with them that they may be contacted for a follow up appointment within 10 days of hospital discharge if their SNF stay is < 30 days.  Contact information for Senior LinkAge Line was also provided.    A: they verbalized understanding.    P: Further questions may be directed to [facility] LinkAge Line at #[phone], option #4 for PAS-RR staff.

## 2018-06-18 NOTE — TELEPHONE ENCOUNTER
Per Dr. Hamlin patient should schedule a consult with Dr. No for Hypersomnia. Referring doctor is Dr. Iona Ghotra.    Ella Marsh

## 2018-06-18 NOTE — PROGRESS NOTES
06/18/18 1052   Quick Adds   Type of Visit Initial PT Evaluation   Living Environment   Lives With alone   Living Arrangements apartment   Home Accessibility no concerns   Number of Stairs to Enter Home 0   Number of Stairs Within Home 0   Living Environment Comment Patient lives in 8th floor independent living apartment with elevator access. Patient has assistance with cleaning and receives 2 meals per day.   Self-Care   Usual Activity Tolerance good   Current Activity Tolerance good   Equipment Currently Used at Home walker, rolling  (4WW)   Functional Level Prior   Ambulation 1-->assistive equipment   Transferring 1-->assistive equipment   Toileting 1-->assistive equipment   Bathing 0-->independent   Dressing 0-->independent   Eating 0-->independent   Fall history within last six months yes   Number of times patient has fallen within last six months 6   Which of the above functional risks had a recent onset or change? ambulation;transferring   General Information   Onset of Illness/Injury or Date of Surgery - Date 06/11/18   Referring Physician Pa Upton MD   Patient/Family Goals Statement Return home   Pertinent History of Current Problem (include personal factors and/or comorbidities that impact the POC) Patient admitted on 6/11/18 for evaluation of recurrent falls. Patient with PMH of CAD, a fib, cardiomyopathy, CKD stage IV, anxiety, dyslipidemia, pseudogout, peripheral vascular disease   Precautions/Limitations fall precautions   Weight-Bearing Status - LLE full weight-bearing   Weight-Bearing Status - RLE full weight-bearing   General Observations Patient sitting up in chair upon PT arrival; daughter present for session   Cognitive Status Examination   Orientation orientation to person, place and time   Level of Consciousness alert   Pain Assessment   Patient Currently in Pain No   Posture    Posture Forward head position;Protracted shoulders;Kyphosis   Range of Motion (ROM)   ROM Quick Adds  "No deficits were identified   Strength   Strength Comments With formal strength testing: bilateral hip flexion 4-/5; knee extension/flexion, ankle dorsiflexio 4/5   Transfer Skills   Transfer Comments Patient completed sit to stand transfer from chair with use of 4WW and SBA. Patient with increased forward flexed posture upon standing   Gait   Gait Comments Patient ambulated 15 feet with use of 4WW and close CGA x 2 due to recurrent history of falls and history of suddenly falling asleep prior to fall. Patient with slow and shuffling gait pattern and forward flexed posture.   Balance   Balance Comments Patient mildly unsteady on feet with use of 4WW. Minor path deviations noted but patient able to self correct without additional assistance   General Therapy Interventions   Planned Therapy Interventions balance training;gait training;strengthening;transfer training;bed mobility training   Clinical Impression   Criteria for Skilled Therapeutic Intervention yes, treatment indicated   PT Diagnosis Decreased mobility secondary to global weakness and recurrent falls   Influenced by the following impairments decreased strength, decreased independence with transfers and ambulation, decreased ambulation distance   Functional limitations due to impairments decreased ambulation and indepenence with mobility   Clinical Presentation Stable/Uncomplicated   Clinical Presentation Rationale medically improving   Clinical Decision Making (Complexity) Low complexity   Therapy Frequency` daily   Predicted Duration of Therapy Intervention (days/wks) 4 days   Anticipated Discharge Disposition Transitional Care Facility   Risk & Benefits of therapy have been explained Yes   Patient, Family & other staff in agreement with plan of care Yes   [facility] AM-PAC TM \"6 Clicks\"   2016, Trustees of [facility], under license to [employer].  All rights reserved.   6 Clicks Short Forms Basic Mobility Inpatient Short Form   [place] " "[place] [facility]  \"6 Clicks\" V.2 Basic Mobility Inpatient Short Form   1. Turning from your back to your side while in a flat bed without using bedrails? 4 - None   2. Moving from lying on your back to sitting on the side of a flat bed without using bedrails? 3 - A Little   3. Moving to and from a bed to a chair (including a wheelchair)? 3 - A Little   4. Standing up from a chair using your arms (e.g., wheelchair, or bedside chair)? 3 - A Little   5. To walk in hospital room? 3 - A Little   6. Climbing 3-5 steps with a railing? 2 - A Lot   Basic Mobility Raw Score (Score out of 24.Lower scores equate to lower levels of function) 18   Total Evaluation Time   Total Evaluation Time (Minutes) 10     "

## 2018-06-18 NOTE — PLAN OF CARE
Problem: Patient Care Overview  Goal: Plan of Care/Patient Progress Review  PT:    Patient previously evaluated on 6/12/18 while under observation status with no PT needed. Patient then switched to inpatient status; physical therapy re-evaluation ordered on 6/17/18. Orders received, chart reviewed, and initial physical therapy evaluation completed.     Discharge Planner PT   Patient plan for discharge: TCU for further rehab then home   Current status: Patient admitted on 6/11/18 for recurrent falls; PMH of CAD, atrial fibrillation, cardiomyopathy, CKD stage IV, anxiety, dyslipidemia, pseudogout, and peripheral vascular disease. Patient seen for physical therapy initial evaluation today. Patient sitting up in chair with daughter present for evaluation. Patient able to answer several questions during initial evaluation but noted to fall asleep abruptly twice throughout. Patient with global weakness in bilateral LEs. Sit to stand transfers completed with use of 4WW and SBA. Patient noted to have increased kyphotic posture upon standing and noted to lean posteriorly while bracing legs on chair for support. Patient ambulate 15 feet x 2 with close CGA x 2 and use of 4WW. Patient required seated rest break between gait trials due to increased fatigue and weakness in bilateral legs.  Barriers to return to prior living situation: Decreased independence with mobility and ambulation, decreased ambulation distance and activity tolerance  Recommendations for discharge: TCU  Rationale for recommendations: Patient would benefit from continued physical therapy to improve global strength, independence with mobility and ambulation, as well as improve activity tolerance and ambulation distance.        Entered by: Reina David 06/18/2018 11:36 AM

## 2018-06-18 NOTE — PLAN OF CARE
Problem: Patient Care Overview  Goal: Plan of Care/Patient Progress Review  Outcome: Improving  Pt. A/O x4 with forgetfulness. VSS. Denies any pain. Assist of 1 with walker/gait belt. Generalized weakness. Tele A FLutter w/bradycardia at times. Occasional drowsiness.  Pt. On regular diet. Discharge to TCU at 16:00. Daughter to transport.

## 2018-06-18 NOTE — PLAN OF CARE
Problem: Patient Care Overview  Goal: Plan of Care/Patient Progress Review  Outcome: Improving  Pt A&O  Forgetful at times, pain managed by PRN tylenol, Pt  on RA VSS, PT up with 1 WGB, voiding tolerated regular diet, IV SL. PT unable to do orthostatic up due to generalized weakness. Plan to D/c pending continue to monitor

## 2018-06-18 NOTE — DISCHARGE SUMMARY
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 Patient syncopizing/falling asleep while speaking to providers frequently this admission, usually lasting a few seconds, and always able to be awakened within seconds by voice or touch. On 6/16 patient fell in the bathroom, XRs and head CT showed no sequelae. Evaluated by both neurology and cardiology without any clear neurological or cardiac related cause to his \"sleeping\" spells. Oximetry study 6/16-6/17 any significant overnight hypoxia. Discussed with sleep study physician Dr. No--patient has known old paramedian pontine christy which is associated with narcolepsy. Plan is to discharge patient to TCU with close outpatient sleep clinic follow up.    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 On admission, Neurology and cardiology both consulted, no clear neurologic or cardiac etiology. Had abnormal stress test however given his frequent falls, however given advanced chronic kidney disease, cardiology recommending medical management at this point. During this admission, patient was noted to have frequent (sometimes once a minute) dozing/sleeping spells, which " "patient himself was not fully aware of.   EEG on 6/15 was notable for \"intermittent generalized slowing which is consistent with a mild encephalopathy.\" However, when patient was evaluated by Neuropsych on 6/15, he tested 30/30 on the mini mental status exam.   MRI this admission notes old paramedian pontine stroke. Patient was hospitalized for MCA stroke in 11/2017 and the same old pontine lesion was noted at that time. Patient denies any known previous history of strokes. The case was discussed with Dr. No, who notes that certain pontine strokes can cause sleep disorders and/or wakefulness issues. There is a known association with pontine strokes and narcolepsy.    - Follow up with sleep clinic while at rehabilitation--full rehabilitation will likely require some improvement in patient's constant sleeping/dozing/syncopal episodes      Chronic afib/flutter  CAD, ischemic cardiomyopathy  High CHADS-VASC score however not anticoagulation candidate secondary to frequent falls. EP following him and planning to follow-up in the clinic to discuss possible watchman procedure. Echo 6/12 with EF 30-40% with WMA. Currently A. fib with controlled ventricular rate. Noted abnormal stress test and given his chronic kidney disease frequent falls and other medical comorbidities cardiology recommending medical management at this point.    - Continue torsemide, Lopressor and losartan, aspirin .  Statin discontinued for his elevated LFTs..      Hospital Course   Bj Pocne was admitted on 6/11/2018.  The following problems were addressed during his hospitalization:    Active Problems:    Fall    Falls      Pa Upton MD    Significant Results and Procedures   Echo 6/12  Lexiscan 6/13  MR brain 6/14  EEG 6/15  Nighttime oximetry 6/17    Pending Results   None    Code Status   Full Code       Primary Care Physician   Azar Zaldivar    # Discharge Pain Plan:   - Patient currently has NO PAIN and is not being prescribed " pain medications on discharge.      Physical Exam   Temp: 97.2  F (36.2  C) Temp src: Oral BP: 147/76 Pulse: 64 Heart Rate: 63 Resp: 16 SpO2: 93 % O2 Device: None (Room air)    Vitals:    06/11/18 1700 06/13/18 0600 06/18/18 0300   Weight: 56.2 kg (123 lb 12.8 oz) 56.6 kg (124 lb 12.5 oz) 55.2 kg (121 lb 9.6 oz)     Vital Signs with Ranges  Temp:  [94.1  F (34.5  C)-97.2  F (36.2  C)] 97.2  F (36.2  C)  Pulse:  [61-64] 64  Heart Rate:  [63] 63  Resp:  [16] 16  BP: (125-147)/(69-76) 147/76  SpO2:  [93 %-97 %] 93 %  I/O last 3 completed shifts:  In: 880 [P.O.:880]  Out: 550 [Urine:550]    Constitutional: Male in NAD  HEENT: Eyes nonicteric, oral mucosa moist  Cardiovascular: RRR, normal S1/2, no m/r/g  Respiratory: CTAB, no wheezing or crackles  Vascular: 1-2+ LE pitting edema, noted distal pedal pulses  GI: Normoactive bowel sounds, nontender, nondistended  Skin/Integumen: Notable bruising in UE and LE, including skin tears over R elbow and wrist where patient fell on 6/16  Neuro/Psych: Appropriate affect and mood. A&Ox3, moves all extremities. Falls asleep when speaking and listening mid-sentence but can be woken up with voice each time.    Discharge Disposition   Discharged to short-term care facility  Condition at discharge: Stable    Consultations This Hospital Stay   SOCIAL WORK IP CONSULT  PHYSICAL THERAPY ADULT IP CONSULT  CARDIOLOGY IP CONSULT  WOUND OSTOMY CONTINENCE NURSE  IP CONSULT  ELECTROPHYSIOLOGY IP CONSULT  NEUROPSYCHOLOGY IP CONSULT  NEUROLOGY IP CONSULT  PHYSICAL THERAPY ADULT IP CONSULT  OCCUPATIONAL THERAPY ADULT IP CONSULT  PHYSICAL THERAPY ADULT IP CONSULT  OCCUPATIONAL THERAPY ADULT IP CONSULT    Time Spent on this Encounter   IPa, personally saw the patient today and spent greater than 30 minutes discharging this patient.    Discharge Orders     Follow-Up with Cardiac Advanced Practice Provider     Cardiac event monitor     General info for SNF   Length of Stay Estimate:  Short Term Care: Estimated # of Days <30  Condition at Discharge: Improving  Level of care:skilled   Rehabilitation Potential: Good  Admission H&P remains valid and up-to-date: Yes  Recent Chemotherapy: N/A  Use Nursing Home Standing Orders: Yes     Mantoux instructions   Give two-step Mantoux (PPD) Per Facility Policy Yes     Reason for your hospital stay   You were hospitalized for falls, and falling asleep recurrently.     Activity - Up with nursing assistance     Additional Discharge Instructions   Patient is a fall risk given his sleeping spells, and should not be left unattended when standing, walking, or in the bathroom.     Full Code     Physical Therapy Adult Consult   Evaluate and treat as clinically indicated.    Reason:  Deconditioning     Occupational Therapy Adult Consult   Evaluate and treat as clinically indicated.    Reason:  Deconditioning     Fall precautions     Advance Diet as Tolerated   Follow this diet upon discharge: Orders Placed This Encounter     Room Service     Regular Diet Adult       Discharge Medications   Current Discharge Medication List      START taking these medications    Details   melatonin 1 MG TABS tablet Take 1 tablet (1 mg) by mouth At Bedtime    Associated Diagnoses: Syncope, unspecified syncope type      multivitamin, therapeutic with minerals (THERA-VIT-M) TABS tablet Take 1 tablet by mouth daily  Qty: 30 each, Refills: 0    Associated Diagnoses: Syncope, unspecified syncope type         CONTINUE these medications which have NOT CHANGED    Details   aspirin EC 81 MG EC tablet Take 1 tablet (81 mg) by mouth daily  Qty: 90 tablet, Refills: 3    Associated Diagnoses: Cerebrovascular accident (CVA), unspecified mechanism (H)      clotrimazole (LOTRIMIN) 1 % cream Apply topically 2 times daily Apply to feet and between toes    Associated Diagnoses: Cerebrovascular accident (CVA), unspecified mechanism (H)      clotrimazole-betamethasone (LOTRISONE) cream Apply topically 2  times daily  Qty: 15 g, Refills: 1      LOSARTAN POTASSIUM PO Take 50 mg by mouth daily      sertraline (ZOLOFT) 50 MG tablet Take 1 tablet (50 mg) by mouth daily  Qty: 90 tablet, Refills: 1    Associated Diagnoses: Anxiety      TORSEMIDE PO Take 10 mg by mouth daily      metoprolol tartrate (LOPRESSOR) 25 MG tablet Take 2 tablets (50 mg) by mouth 2 times daily  Qty: 120 tablet, Refills: 11    Associated Diagnoses: Cerebrovascular accident (CVA), unspecified mechanism (H)           Allergies   No Known Allergies  Data   Most Recent 3 CBC's:  Recent Labs   Lab Test  06/17/18   0830  06/14/18   1218  06/11/18   1745   WBC  5.5  6.3  5.7   HGB  11.1*  10.9*  10.7*   MCV  93  95  94   PLT  229  235  219      Most Recent 3 BMP's:  Recent Labs   Lab Test  06/17/18   0830  06/14/18   0915  06/11/18   1745   NA  140  141  142   POTASSIUM  4.4  4.9  4.3   CHLORIDE  108  108  107   CO2  22  26  26   BUN  55*  47*  48*   CR  2.23*  2.11*  2.12*   ANIONGAP  10  7  9   GARETT  9.1  9.4  9.3   GLC  78  92  94     Most Recent 2 LFT's:  Recent Labs   Lab Test  01/25/18   1449 12/12/17 12/06/16   0936   AST  43  34  34   ALT  25  40  38   ALKPHOS  227*   --   182*   BILITOTAL  0.7   --   0.6     Most Recent INR's and Anticoagulation Dosing History:  Anticoagulation Dose History     Recent Dosing and Labs Latest Ref Rng & Units 4/10/2018 4/13/2018 4/24/2018 5/8/2018 5/18/2018 6/3/2018 6/11/2018    Warfarin 7.5 mg - - - - - - 7.5 mg -    INR 0.86 - 1.14 5.2 - - - - 1.49(H) 1.20(H)    INR 0.86 - 1.14 - 2.2(A) 3.7(A) 2.5(A) 2.8(A) - -        Most Recent 3 Troponin's:No lab results found.  Most Recent Cholesterol Panel:  Recent Labs   Lab Test  12/06/16   0936   CHOL  151   LDL  78   HDL  50   TRIG  115     Most Recent 6 Bacteria Isolates From Any Culture (See EPIC Reports for Culture Details):  Recent Labs   Lab Test  06/11/18   1925   CULT  <10,000 colonies/mL  urogenital lillian    Susceptibility testing not routinely done     Most  Recent TSH, T4 and A1c Labs:  Recent Labs   Lab Test  06/11/18   1745   TSH  2.17     Results for orders placed or performed during the hospital encounter of 06/11/18   CT Head w/o Contrast    Narrative    CT OF THE HEAD WITHOUT CONTRAST 6/11/2018 8:02 PM     COMPARISON: Head CT 6/3/2018    HISTORY: Fall.    TECHNIQUE: 5 mm thick axial CT images of the head were acquired  without IV contrast material.    FINDINGS: There is moderate diffuse cerebral volume loss. There are  subtle patchy areas of decreased density in the cerebral white matter  bilaterally that are consistent with sequela of chronic small vessel  ischemic disease.    The ventricles and basal cisterns are within normal limits in  configuration given the degree of cerebral volume loss.  There is no  midline shift. There are no extra-axial fluid collections.    No intracranial hemorrhage, mass or recent infarct.    The visualized paranasal sinuses are well-aerated. There is no  mastoiditis. There are no fractures of the visualized bones.      Impression    IMPRESSION: Diffuse cerebral volume loss and cerebral white matter  changes consistent with chronic small vessel ischemic disease. No  evidence for acute intracranial pathology.      Radiation dose for this scan was reduced using automated exposure  control, adjustment of the mA and/or kV according to patient size, or  iterative reconstruction technique    MICAH BISHOP MD   US Lower Extremity Venous Duplex Bilateral    Narrative    US LOWER EXTREMITY VENOUS DUPLEX BILATERAL6/12/2018 11:29 AM    HISTORY: Rule out DVT, assymetrical right leg swelling compared to  left;     TECHNIQUE:  Venous Doppler US.? Color flow and spectral Doppler with  waveform analysis performed.      Impression    IMPRESSION:  No evidence of lower extremity deep venous thrombosis.    HARSHIL OWUSU MD   NM MPI w Lexiscan    Narrative    GATED MYOCARDIAL PERFUSION SCINTIGRAPHY WITH INTRAVENOUS PHARMACOLOGIC  VASODILATATION LEXISCAN  -ONE DAY STUDY     6/13/2018 2:54 PM  DEMI BAILEY  88 years  Male  10/6/1929.    Indication/Clinical History: Ischemic cardiomyopathy status post  coronary bypass surgery with atrial fibrillation    Impression  1.  Myocardial perfusion imaging using single isotope technique  demonstrated small to moderate size partly reversible  inferior/inferolateral defect extending from the base to the apex  consistent with small nontransmural infarct with mild-to-moderate  monica-infarct ischemia extending to the apex. Specificity maybe  affected by significant diaphragm attenuation/bowel uptake artifact.  No other significant ischemia or infarct noted..   2. Gated images demonstrated moderately dilated left ventricle with  severe decrease in overall contractility with severe global  hypokinesis and akinesis of the inferolateral wall at the base..  The  left ventricular systolic function is severely reduced with ejection  fraction of 22%.  3. Compared to the prior study from 3/17/2005 there is mild to  moderate ischemia in the area of the previously described  inferolateral scar .    Procedure  Pharmacologic stress testing was performed with Lexiscan at a rate of  0.08 mg/ml rapid bolus injection, for 15 seconds, 0.4 mg/5ml  intravenously. Low-level exercise was not performed along with the  vasodilator infusion.  The heart rate was 67 at baseline and estiven to  65 beats per minute during the Lexiscan infusion. The rest blood  pressure was 152/70 mmHg and was 134/76 mm Hg during Lexiscan  infusion. The patient experienced shortness of breath  during the  test.    Myocardial perfusion imaging was performed at rest, approximately 45  minutes after the injection intravenously of 8.0 mCi of Tc-99m  Myoview. At peak pharmacologic effect, 10-20 seconds after Lexiscan,   the patient was injected intravenously with 26.0 mCi of  Tc-99m  Myoview. The post-stress tomographic imaging was performed  approximately 60 minutes after  stress.    EKG Findings  The resting EKG demonstrated atrial fibrillation with evidence of old  inferolateral infarct with isolated ventricular ectopy and nonspecific  ST-T wave changes. The stress EKG demonstrated atrial fibrillation  with old inferolateral infarct and no significant ST depression.    Tomographic Findings  Overall, the study quality is fair with significant diaphragm  attenuation/bowel uptake artifact . On the stress images, there is a  some small to moderate size mild inferior/inferolateral defect  extending from the base towards the apex. On the rest images, there is  a small mild inferolateral defect at the base with mild decrease in  apical activity . Gated images demonstrated moderately dilated left  ventricle with severe decrease in overall contractility with severe  global hypokinesis and akinesis of the inferolateral wall at the base.  The left ventricular ejection fraction was calculated to be 22% with  stress 34% at rest. TID was present.    KAYLA LION MD   MR Brain w/o Contrast    Narrative    MRI BRAIN WITHOUT CONTRAST  6/14/2018 11:06 PM    HISTORY:  Syncopal spells.     TECHNIQUE:  Multiplanar, multisequence MRI of the brain without  gadolinium IV contrast material.    COMPARISON:  CT scan 6/11/2018.    FINDINGS:  There is generalized atrophy of the brain. White matter  changes are seen in the cerebral hemispheres consistent with sequelae  of small vessel ischemic disease. Focal paramagnetic effect is seen in  the right paramedian christy consistent with an old hemorrhage. There is  no evidence of acute hemorrhage, infarct or mass. Mild  encephalomalacia and gliosis is seen in the occipital lobes  bilaterally.    There is an intraocular implant in the left globe. Otherwise facial  structures appear normal. The arteries at the base of the brain and  the dural venous sinuses appear patent.      Impression    IMPRESSION:    1. No acute abnormality.  2. Brain atrophy and white matter  changes consistent with sequelae of  small vessel ischemic disease.  3. Old hemorrhage in the right paramedian christy.  4. Encephalomalacia and gliosis in the occipital lobes.    DRU ALEXANDER MD   CT Head w/o Contrast    Narrative    CT OF THE HEAD WITHOUT CONTRAST 6/16/2018 9:42 AM     COMPARISON: Brain MR 6/14/2018.    HISTORY: Fall, hit head.    TECHNIQUE: 5 mm thick axial CT images of the head were acquired  without IV contrast material.    FINDINGS:  There is moderate diffuse cerebral volume loss. There are  subtle patchy areas of decreased density in the cerebral white matter  bilaterally that are consistent with sequela of chronic small vessel  ischemic disease.     The ventricles and basal cisterns are within normal limits in  configuration given the degree of cerebral volume loss.  There is no  midline shift. There are no extra-axial fluid collections.     No intracranial hemorrhage, mass or recent infarct.    The visualized paranasal sinuses are well-aerated. There is no  mastoiditis. There are no fractures of the visualized bones.      Impression    IMPRESSION:  Diffuse cerebral volume loss and cerebral white matter  changes consistent with chronic small vessel ischemic disease. No  evidence for acute intracranial pathology.       Radiation dose for this scan was reduced using automated exposure  control, adjustment of the mA and/or kV according to patient size, or  iterative reconstruction technique.    MICAH BISHOP MD   XR Pelvis w Hip Right 1 View    Narrative    PELVIS AND HIP RIGHT ONE VIEW  6/16/2018 10:00 AM     COMPARISON: None.    HISTORY: Fall, right hip pain.     FINDINGS: The visualized bones and joint spaces are within normal  limits.      Impression    IMPRESSION: No evidence for fracture, dislocation or significant  degenerative change of the pelvis or right hip.    MICAH BISHOP MD   XR Elbow Right G/E 3 Views    Narrative    RIGHT ELBOW THREE OR MORE VIEWS  6/16/2018 10:01 AM      COMPARISON: None.    HISTORY: Fall, hit right elbow.      Impression    IMPRESSION: There is osteophytic spurring of the radial head. No  fractures noted. No significant right elbow joint effusion.    MICAH BISHOP MD

## 2018-06-18 NOTE — PROGRESS NOTES
SPIRITUAL HEALTH SERVICES Progress Note  [facility] 66    SH, LOS visit. The patient and daughter reported having a spiritual care from clergy family members a , [place] Professor, and Kale. The patient reported not being interested in SH but went on talked about having good family support.     provided care in presence/listening.    Coping with family support    SH has no further plans.         Barry Ladd  Chaplain Resident

## 2018-06-18 NOTE — PLAN OF CARE
Problem: Patient Care Overview  Goal: Plan of Care/Patient Progress Review  Physical Therapy Discharge Summary    Reason for therapy discharge:    Discharged to transitional care facility.    Progress towards therapy goal(s). See goals on Care Plan in [facility] electronic health record for goal details.  Goals not met.  Barriers to achieving goals:   discharge from facility.    Therapy recommendation(s):    Continued therapy is recommended.  Rationale/Recommendations:  Improve independence with mobility and ambulation, improve activity tolerance.

## 2018-06-19 NOTE — PROGRESS NOTES
"Reviewed message from LPN from ?? Reviewed chart- event monitor placed at time of discharge. Patient has chronic AF. Monitor placed to assess for pauses or severe pedrito that may be contributing to his increase in falls and \"syncopal type spells\". Once strip is received we will start a folder. Pt currently scheduled to be seen here on Thursday. Otherwise pt has not been to this clinic since 2013. ASHLEY Bryant  "

## 2018-06-19 NOTE — TELEPHONE ENCOUNTER
Type of call: Monitor    Monitor  Company: [employer]  Type of monitor: Event Monitor  Rhythm: New onset Afib, HR 50 BMP.  1551 on 06/18/2018  Duration: Pt still in A fib  Symptomatic: No.  Was the patient notified?: No  Was the report downloaded or faxed?: Downloaded.    [place] Pt.  Forwarded to [place] for review.    Lizbeth Schwab LPN

## 2018-06-19 NOTE — TELEPHONE ENCOUNTER
Patient was evaluated by cardiology while inpatient for due to frequent falls resulting in limb and head trauma. OAC discontinued secondary to frequent falls. RN called [facility] TCU to confirm the follow up plan for patient with Care Coordinator. RN confirmed with Care Coordinator that patient has a scheduled F/U appt on 6/21/18 with ELIZABETH Landeros. RN confirmed with Care Coordinator to include last progress note, MAR, active orders, and provider order sheet to appt. Confirmed that pt is wearing a 30 day event monitor. ISMAEL Moody RN.

## 2018-06-19 NOTE — PROGRESS NOTES
DC to [facility], transported c daughter. AVS reviewed c pt and daughter. TCU packet sent with daughter. DC at 1615.

## 2018-06-21 PROBLEM — I25.10 CORONARY ARTERY DISEASE INVOLVING NATIVE CORONARY ARTERY OF NATIVE HEART: Status: ACTIVE | Noted: 2018-01-01

## 2018-06-21 NOTE — MR AVS SNAPSHOT
After Visit Summary   6/21/2018    Bj Ponce    MRN: [id number]           Patient Information     Date Of Birth          10/6/1929        Visit Information        Provider Department      6/21/2018 12:30 PM Khadijah Brand APRN [facility] Instructions    Thanks for coming into [facility] clinic today.    Reviewed results of cardiac work up.  Abnormal stress test with medical therapy.  Continue on aspirin, metoprolol, and losartan.  No evidence of bleeding.   Continues with frequent falls, most recent yesterday.  Continue with event monitor.  Will call with results and recommendation once monitor is complete.           Please call my nurse at [phone] with any questions or concerns.    Scheduling phone number: [phone]  Reminder: Please bring in all current medications, over the counter supplements and vitamin bottles to your next appointment.                Follow-ups after your visit        Your next 10 appointments already scheduled     Jun 22, 2018 12:00 PM CDT   New Sleep Patient with Vivek No MD   [facility] ([facility])    [address] [address]   [phone]            Jun 22, 2018  1:00 PM CDT   Actigraphy Pickup with BED 7 SH SLEEP   [facility] ([facility])    [address] [address]   [phone]            Jul 06, 2018 10:30 AM CDT   Actigraphy Drop Off with [facility] DME   [facility] ([facility])    [address] [address]   [phone]            Jul 09, 2018 11:00 AM CDT   Office Visit with Azar Zaldivar MD   [facility]    [address] [facility] [address] [phone]           Bring a current list of meds and  "any records pertaining to this visit. For Physicals, please bring immunization records and any forms needing to be filled out. Please arrive 10 minutes early to complete paperwork.            Jul 23, 2018 11:00 AM CDT   [facility] EP RETURN with Carri Sherman PA-C   [facility] ([facility])    [address]  [facility] [address]   [phone] OPT 2            Aug 13, 2018  3:45 PM CDT   New Visit with Emeterio Coleman MD   [facility] ([facility])    [address]  [facility] [place] [address]   [phone] OPT 2              Who to contact     If you have questions or need follow up information about today's clinic visit or your schedule please contact [facility] directly at [phone].  Normal or non-critical lab and imaging results will be communicated to you by MyChart, letter or phone within 4 business days after the clinic has received the results. If you do not hear from us within 7 days, please contact the clinic through MyChart or phone. If you have a critical or abnormal lab result, we will notify you by phone as soon as possible.  Submit refill requests through [employer] or call your pharmacy and they will forward the refill request to us. Please allow 3 business days for your refill to be completed.          Additional Information About Your Visit        Care EveryWhere ID     This is your Care EveryWhere ID. This could be used by other organizations to access your [place] medical records  [id number]        Your Vitals Were     Pulse Height Pulse Oximetry BMI (Body Mass Index)          61 1.753 m (5' 9\") 92% 18.46 kg/m2         Blood Pressure from Last 3 Encounters:   06/21/18 134/68   06/18/18 154/76   06/11/18 160/86    Weight from Last 3 Encounters:   06/21/18 56.7 kg (125 lb)   06/18/18 55.2 kg (121 lb 9.6 oz)   06/05/18 58.1 kg " (128 lb)              Today, you had the following     No orders found for display       Primary Care Provider Office Phone # Fax #    Azar Zaldivar [phone] [address] [facility] [address]  [facility] [address]        Equal Access to Services     LALITHA MERA : Hadii aad ku hadasho Soomaali, waaxda luqadaha, qaybta kaalmada adeegyada, waxay idiin hayaan adeeg priti laRadhajacalireza . So [facility] [phone].    ATENCIÓN: Si habla español, tiene a clifford disposición servicios gratuitos de asistencia lingüística. Llame al [phone].    We comply with applicable federal civil rights laws and [place] laws. We do not discriminate on the basis of race, color, national origin, age, disability, sex, sexual orientation, or gender identity.            Thank you!     Thank you for choosing [facility]  for your care. Our goal is always to provide you with excellent care. Hearing back from our patients is one way we can continue to improve our services. Please take a few minutes to complete the written survey that you may receive in the mail after your visit with us. Thank you!             Your Updated Medication List - Protect others around you: Learn how to safely use, store and throw away your medicines at www.disposemymeds.org.          This list is accurate as of 6/21/18  1:10 PM.  Always use your most recent med list.                   Brand Name Dispense Instructions for use Diagnosis    aspirin 81 MG EC tablet     90 tablet    Take 1 tablet (81 mg) by mouth daily    Cerebrovascular accident (CVA), unspecified mechanism (H)       clotrimazole 1 % cream    LOTRIMIN     Apply topically 2 times daily Apply to feet and between toes    Cerebrovascular accident (CVA), unspecified mechanism (H)       clotrimazole-betamethasone cream    LOTRISONE    15 g    Apply topically 2 times daily        LOSARTAN POTASSIUM PO      Take 50 mg by mouth daily        melatonin 1 MG Tabs tablet       Take 1 tablet (1 mg) by mouth At Bedtime    Syncope, unspecified syncope type       metoprolol tartrate 25 MG tablet    LOPRESSOR    120 tablet    Take 2 tablets (50 mg) by mouth 2 times daily    Cerebrovascular accident (CVA), unspecified mechanism (H)       multivitamin, therapeutic with minerals Tabs tablet     30 each    Take 1 tablet by mouth daily    Syncope, unspecified syncope type       sertraline 50 MG tablet    ZOLOFT    90 tablet    Take 1 tablet (50 mg) by mouth daily    Anxiety       TORSEMIDE PO      Take 10 mg by mouth daily

## 2018-06-21 NOTE — PROGRESS NOTES
Cardiology Clinic Progress Note  Bj Ponce MRN# [id number]   YOB: 1929 Age: 88 year old     Reason For Visit:  Hospital discharge follow up   Primary Cardiologist:   Dr. Coleman          History of Presenting Illness:    Bj Ponce is a pleasant 88 year old patient who carries a  history significant for hypertension, hyperlipidemia, chronic kidney disease, coronary disease (CABG 2004), ischemic cardiomyopathy, permanent atrial fibrillation and a recent stroke in November 2017.  On 6/11/2018 he was admitted with frequent falls and head trauma.  CT was negative for acute intracranial hemorrhage, mass or recent infarct.  His cardiac workup consisted of an abnormal nuclear stress test and echo that confirmed an ejection fraction estimated at 30-40%, moderate to severe MR, moderate TR, moderate to severe pulmonary hypertension and mild to moderate aortic regurgitation.  He was evaluated by electrophysiology for the evaluation of arrhythmia and cardiomyopathy.  No bradycardia or tachycardia were seen on telemetry during his admission but was recommended a 30 day event monitor at the time of discharge.  An ICD was not recommended due to his advanced age and the fact that he was showing signs of failure to thrive.  This was discussed with the patient's daughter who agreed.  The abnormal stress test was reviewed with patient's daughter and given his extreme debility and tendency for falls which his warfarin had been discontinued, a conservative management approach was recommended.  [facility] was [facility] on a 30 day event monitor.  He comes to the office today accompanied by his daughter for hospital follow-up.    He presents today very frail and fatigued.  He denies chest pain, shortness of breath, PND, orthopnea,  heart racing, or palpitations.  He does admit to falling out of his chair yesterday after falling asleep and leaning forward.  He does admit to hitting the top of his head.  He is noted to be easily falling asleep during our office visit today. His daughter states they have an  appointment with a sleep doctor tomorrow to evaluate narcolepsy.  She also makes mention that she plans to have him evaluated for Parkinson's as she has noticed a recent tremor.  He has yet to participate in any of the physical therapy sessions at the [facility].     Blood pressure is stable at 134/68, heart rate 61.  He is currently wearing his event monitor.  Last BMP is stable at sodium of 140, potassium 4.4, BUN 55, creatinine 2.23, GFR 28.    Atrial fibrillation is rate controlled on metoprolol 50 mg twice daily.  He was decided he should remain off anticoagulation, except for low-dose aspirin, due to his frequency and falls.    He is tolerating his current medications.                      Assessment and Plan:     1. -Atrial fibrillation-rate controlled on metoprolol.  Continue with current therapy.  Off anticoagulation with the exception of aspirin (see HPI).  Continue with 30 day event monitor, will call with results and recommendations upon completion of monitor.     2.  Coronary artery disease-abnormal stress test . small to moderate size partly reversible, inferior/inferolateral defect extending from the base to the apex, consistent with small nontransmural infarct with mild-to-moderate  monica-infarct ischemia extending to the apex. He is not an ideal candidate for dual antiplatelet therapy.  A conservative approach was recommended and agreed upon by patient and his daughter.  Continue with current medical therapy.  Encourage exercise.    3.  Ischemic cardiomyopathy -  Echo confirms LV 35-40%, continue with current medical therapy.     4. Recurrent falls-most recent yesterday.  He has scheduled follow-ups with sleep medicine and his PCP.  5.  Hypertension -well controlled, continue on losartan, metoprolol, torsemide          Thank you for allowing me to participate in this delightful patient's care.        Khadijah  "AGUSTÍN Brand, CNP          Review of Systems:   Review of Systems:  Skin:  Negative     Eyes:  Negative    ENT:  Positive for    Respiratory:  Negative    Cardiovascular:  Negative;palpitations;chest pain Positive for;edema;syncope or near-syncope  Gastroenterology: Negative    Genitourinary:  Negative    Musculoskeletal:  Negative nocturnal cramping  Neurologic:  Negative    Psychiatric:  Negative    Heme/Lymph/Imm:  Negative    Endocrine:  Negative                Physical Exam:     Vitals: /68 (BP Location: Right arm, Patient Position: Chair, Cuff Size: Adult Regular)  Pulse 61  Ht 1.753 m (5' 9\")  Wt 56.7 kg (125 lb)  SpO2 92%  BMI 18.46 kg/m2  Constitutional:  cooperative;in no acute distress thin;frail      Skin:  warm and dry to the touch bruises present      Head:  normocephalic facial abrasions and bruising      Eyes:  conjunctivae and lids unremarkable;sclera white        ENT:  no pallor or cyanosis;dentition good        Neck:  carotid pulses are full and equal bilaterally        Chest:  normal symmetry fine rales      Cardiac:   irregular rhythm     systolic murmur          Abdomen:  abdomen soft;non-tender        Vascular:       2+ 2+           2+ 2+                Extremities and Back:  no deformities, clubbing, cyanosis, erythema observed        Neurological:    limb weakness extreme fatigue             Medications:     Current Outpatient Prescriptions   Medication Sig Dispense Refill     aspirin EC 81 MG EC tablet Take 1 tablet (81 mg) by mouth daily 90 tablet 3     clotrimazole (LOTRIMIN) 1 % cream Apply topically 2 times daily Apply to feet and between toes       clotrimazole-betamethasone (LOTRISONE) cream Apply topically 2 times daily 15 g 1     LOSARTAN POTASSIUM PO Take 50 mg by mouth daily       melatonin 1 MG TABS tablet Take 1 tablet (1 mg) by mouth At Bedtime       metoprolol tartrate (LOPRESSOR) 25 MG tablet Take 2 tablets (50 mg) by mouth 2 times daily 120 tablet 11     " multivitamin, therapeutic with minerals (THERA-VIT-M) TABS tablet Take 1 tablet by mouth daily 30 each 0     sertraline (ZOLOFT) 50 MG tablet Take 1 tablet (50 mg) by mouth daily 90 tablet 1     TORSEMIDE PO Take 10 mg by mouth daily             Family History   Problem Relation Age of Onset     Breast Cancer Sister        Social History     Social History     Marital status:      Spouse name: N/A     Number of children: 3     Years of education: N/A     Occupational History           Social History Main Topics     Smoking status: Former Smoker     Quit date: 4/16/1954     Smokeless tobacco: Never Used     Alcohol use No     Drug use: No     Sexual activity: Not Currently     Other Topics Concern     Not on file     Social History Narrative            Past Medical History:     Past Medical History:   Diagnosis Date     afib 2004, 2010, 2012    post op and then again 2010, 2012     Anxiety 01/2018    added zoloft     ASCVD (arteriosclerotic cardiovascular disease) 2004    angio for cp and 2 vessel dz, cabg done 2004, post op afib     Cardiomyopathy (H) 11/2017    found on echo done during cvi in [facility]; echo 1/18 here with ef 38%, global hypokinesis     CKD (chronic kidney disease) stage 4, GFR 15-29 ml/min (H)      CVA (cerebral vascular accident) (H) 11/25/2017    L MCA embolism, received TPA. No residual deficits, add coumadin     Genital herpes      HTN (hypertension)      Hypercholesteremia      Pseudogout      PVD (peripheral vascular disease) with claudication (H)               Past Surgical History:     Past Surgical History:   Procedure Laterality Date     CATARACT IOL, RT/LT  2006     CORONARY ARTERY BYPASS  2004     HYDROCELECTOMY SCROTAL  1997     TONSILLECTOMY       TURP  1997              Allergies:   Review of patient's allergies indicates no known allergies.       Data:   All laboratory data reviewed:    LAST CHOLESTEROL:  Lab Results   Component Value Date    CHOL 151  12/06/2016     Lab Results   Component Value Date    HDL 50 12/06/2016     Lab Results   Component Value Date    LDL 78 12/06/2016     Lab Results   Component Value Date    TRIG 115 12/06/2016     Lab Results   Component Value Date    CHOLHDLRATIO 3.0 10/05/2015       LAST BMP:  Lab Results   Component Value Date     06/17/2018      Lab Results   Component Value Date    POTASSIUM 4.4 06/17/2018     Lab Results   Component Value Date    CHLORIDE 108 06/17/2018     Lab Results   Component Value Date    GARETT 9.1 06/17/2018     Lab Results   Component Value Date    CO2 22 06/17/2018     Lab Results   Component Value Date    BUN 55 06/17/2018     Lab Results   Component Value Date    CR 2.23 06/17/2018     Lab Results   Component Value Date    GLC 78 06/17/2018       LAST CBC:  Lab Results   Component Value Date    WBC 5.5 06/17/2018     Lab Results   Component Value Date    RBC 3.63 06/17/2018     Lab Results   Component Value Date    HGB 11.1 06/17/2018     Lab Results   Component Value Date    HCT 33.9 06/17/2018     Lab Results   Component Value Date    MCV 93 06/17/2018     Lab Results   Component Value Date    MCH 30.6 06/17/2018     Lab Results   Component Value Date    MCHC 32.7 06/17/2018     Lab Results   Component Value Date    RDW 15.6 06/17/2018     Lab Results   Component Value Date     06/17/2018

## 2018-06-21 NOTE — LETTER
6/21/2018    Azar Zaldivar MD  [address] [facility] [address]  [facility] [address]    RE: Bj Ponce       Dear Colleague,    I had the pleasure of seeing Bj Ponce in the [facility].    Cardiology Clinic Progress Note  Bj Ponce MRN# [id number]   YOB: 1929 Age: 88 year old     Reason For Visit:  Hospital discharge follow up   Primary Cardiologist:   Dr. Coleman          History of Presenting Illness:    Bj Ponce is a pleasant 88 year old patient who carries a  history significant for hypertension, hyperlipidemia, chronic kidney disease, coronary disease (CABG 2004), ischemic cardiomyopathy, permanent atrial fibrillation and a recent stroke in November 2017.  On 6/11/2018 he was admitted with frequent falls and head trauma.  CT was negative for acute intracranial hemorrhage, mass or recent infarct.  His cardiac workup consisted of an abnormal nuclear stress test and echo that confirmed an ejection fraction estimated at 30-40%, moderate to severe MR, moderate TR, moderate to severe pulmonary hypertension and mild to moderate aortic regurgitation.  He was evaluated by electrophysiology for the evaluation of arrhythmia and cardiomyopathy.  No bradycardia or tachycardia were seen on telemetry during his admission but was recommended a 30 day event monitor at the time of discharge.  An ICD was not recommended due to his advanced age and the fact that he was showing signs of failure to thrive.  This was discussed with the patient's daughter who agreed.  The abnormal stress test was reviewed with patient's daughter and given his extreme debility and tendency for falls which his warfarin had been discontinued, a conservative management approach was recommended.  He was discharged to U ([facility]) on a 30 day event monitor.  He comes to the office today accompanied by his daughter for hospital follow-up.    He presents today very frail and fatigued.  He denies chest  pain, shortness of breath, PND, orthopnea,  heart racing, or palpitations.  He does admit to falling out of his chair yesterday after falling asleep and leaning forward.  He does admit to hitting the top of his head. He is noted to be easily falling asleep during our office visit today. His daughter states they have an  appointment with a sleep doctor tomorrow to evaluate narcolepsy.  She also makes mention that she plans to have him evaluated for Parkinson's as she has noticed a recent tremor.  He has yet to participate in any of the physical therapy sessions at the TCU.     Blood pressure is stable at 134/68, heart rate 61.  He is currently wearing his event monitor.  Last BMP is stable at sodium of 140, potassium 4.4, BUN 55, creatinine 2.23, GFR 28.    Atrial fibrillation is rate controlled on metoprolol 50 mg twice daily.  He was decided he should remain off anticoagulation, except for low-dose aspirin, due to his frequency and falls.    He is tolerating his current medications.                      Assessment and Plan:     1. -Atrial fibrillation-rate controlled on metoprolol.  Continue with current therapy.  Off anticoagulation with the exception of aspirin (see HPI).  Continue with 30 day event monitor, will call with results and recommendations upon completion of monitor.     2.  Coronary artery disease-abnormal stress test . small to moderate size partly reversible, inferior/inferolateral defect extending from the base to the apex, consistent with small nontransmural infarct with mild-to-moderate  monica-infarct ischemia extending to the apex. He is not an ideal candidate for dual antiplatelet therapy.  A conservative approach was recommended and agreed upon by patient and his daughter.  Continue with current medical therapy.  Encourage exercise.    3.  Ischemic cardiomyopathy -  Echo confirms LV 35-40%, continue with current medical therapy.     4. Recurrent falls-most recent yesterday.  He has scheduled  "follow-ups with sleep medicine and his PCP.  5.  Hypertension -well controlled, continue on losartan, metoprolol, torsemide          Thank you for allowing me to participate in this delightful patient's care.        AGUSTÍN Landeros, CNP          Review of Systems:   Review of Systems:  Skin:  Negative     Eyes:  Negative    ENT:  Positive for    Respiratory:  Negative    Cardiovascular:  Negative;palpitations;chest pain Positive for;edema;syncope or near-syncope  Gastroenterology: Negative    Genitourinary:  Negative    Musculoskeletal:  Negative nocturnal cramping  Neurologic:  Negative    Psychiatric:  Negative    Heme/Lymph/Imm:  Negative    Endocrine:  Negative                Physical Exam:     Vitals: /68 (BP Location: Right arm, Patient Position: Chair, Cuff Size: Adult Regular)  Pulse 61  Ht 1.753 m (5' 9\")  Wt 56.7 kg (125 lb)  SpO2 92%  BMI 18.46 kg/m2  Constitutional:  cooperative;in no acute distress thin;frail      Skin:  warm and dry to the touch bruises present      Head:  normocephalic facial abrasions and bruising      Eyes:  conjunctivae and lids unremarkable;sclera white        ENT:  no pallor or cyanosis;dentition good        Neck:  carotid pulses are full and equal bilaterally        Chest:  normal symmetry fine rales      Cardiac:   irregular rhythm     systolic murmur          Abdomen:  abdomen soft;non-tender        Vascular:       2+ 2+           2+ 2+                Extremities and Back:  no deformities, clubbing, cyanosis, erythema observed        Neurological:    limb weakness extreme fatigue             Medications:     Current Outpatient Prescriptions   Medication Sig Dispense Refill     aspirin EC 81 MG EC tablet Take 1 tablet (81 mg) by mouth daily 90 tablet 3     clotrimazole (LOTRIMIN) 1 % cream Apply topically 2 times daily Apply to feet and between toes       clotrimazole-betamethasone (LOTRISONE) cream Apply topically 2 times daily 15 g 1     LOSARTAN POTASSIUM PO " Take 50 mg by mouth daily       melatonin 1 MG TABS tablet Take 1 tablet (1 mg) by mouth At Bedtime       metoprolol tartrate (LOPRESSOR) 25 MG tablet Take 2 tablets (50 mg) by mouth 2 times daily 120 tablet 11     multivitamin, therapeutic with minerals (THERA-VIT-M) TABS tablet Take 1 tablet by mouth daily 30 each 0     sertraline (ZOLOFT) 50 MG tablet Take 1 tablet (50 mg) by mouth daily 90 tablet 1     TORSEMIDE PO Take 10 mg by mouth daily             Family History   Problem Relation Age of Onset     Breast Cancer Sister        Social History     Social History     Marital status:      Spouse name: N/A     Number of children: 3     Years of education: N/A     Occupational History           Social History Main Topics     Smoking status: Former Smoker     Quit date: 4/16/1954     Smokeless tobacco: Never Used     Alcohol use No     Drug use: No     Sexual activity: Not Currently     Other Topics Concern     Not on file     Social History Narrative            Past Medical History:     Past Medical History:   Diagnosis Date     afib 2004, 2010, 2012    post op and then again 2010, 2012     Anxiety 01/2018    added zoloft     ASCVD (arteriosclerotic cardiovascular disease) 2004    angio for cp and 2 vessel dz, cabg done 2004, post op afib     Cardiomyopathy (H) 11/2017    found on echo done during cvi in [facility]; echo 1/18 here with ef 38%, global hypokinesis     CKD (chronic kidney disease) stage 4, GFR 15-29 ml/min (H)      CVA (cerebral vascular accident) (H) 11/25/2017    L MCA embolism, received TPA. No residual deficits, add coumadin     Genital herpes      HTN (hypertension)      Hypercholesteremia      Pseudogout      PVD (peripheral vascular disease) with claudication (H)               Past Surgical History:     Past Surgical History:   Procedure Laterality Date     CATARACT IOL, RT/LT  2006     CORONARY ARTERY BYPASS  2004     HYDROCELECTOMY SCROTAL  1997     TONSILLECTOMY        KASEY  1997              Allergies:   Review of patient's allergies indicates no known allergies.       Data:   All laboratory data reviewed:    LAST CHOLESTEROL:  Lab Results   Component Value Date    CHOL 151 12/06/2016     Lab Results   Component Value Date    HDL 50 12/06/2016     Lab Results   Component Value Date    LDL 78 12/06/2016     Lab Results   Component Value Date    TRIG 115 12/06/2016     Lab Results   Component Value Date    CHOLHDLRATIO 3.0 10/05/2015       LAST BMP:  Lab Results   Component Value Date     06/17/2018      Lab Results   Component Value Date    POTASSIUM 4.4 06/17/2018     Lab Results   Component Value Date    CHLORIDE 108 06/17/2018     Lab Results   Component Value Date    GARETT 9.1 06/17/2018     Lab Results   Component Value Date    CO2 22 06/17/2018     Lab Results   Component Value Date    BUN 55 06/17/2018     Lab Results   Component Value Date    CR 2.23 06/17/2018     Lab Results   Component Value Date    GLC 78 06/17/2018       LAST CBC:  Lab Results   Component Value Date    WBC 5.5 06/17/2018     Lab Results   Component Value Date    RBC 3.63 06/17/2018     Lab Results   Component Value Date    HGB 11.1 06/17/2018     Lab Results   Component Value Date    HCT 33.9 06/17/2018     Lab Results   Component Value Date    MCV 93 06/17/2018     Lab Results   Component Value Date    MCH 30.6 06/17/2018     Lab Results   Component Value Date    MCHC 32.7 06/17/2018     Lab Results   Component Value Date    RDW 15.6 06/17/2018     Lab Results   Component Value Date     06/17/2018       Thank you for allowing me to participate in the care of your patient.    Sincerely,     AGUSTÍN Landeros CNP     [facility]

## 2018-06-21 NOTE — PATIENT INSTRUCTIONS
Thanks for coming into [facility] clinic today.    Reviewed results of cardiac work up.  Abnormal stress test with medical therapy.  Continue on aspirin, metoprolol, and losartan.  No evidence of bleeding.   Continues with frequent falls, most recent yesterday.  Continue with event monitor.  Will call with results and recommendation once monitor is complete.           Please call my nurse at [phone] with any questions or concerns.    Scheduling phone number: [phone]  Reminder: Please bring in all current medications, over the counter supplements and vitamin bottles to your next appointment.

## 2018-06-22 NOTE — PROGRESS NOTES
Sleep Consultation:    Date on this visit: 6/22/2018    History of hypertension, hyperlipidemia, chronic kidney disease, coronary disease (CABG 2004), ischemic cardiomyopathy, permanent atrial fibrillation and a recent stroke in November 2017.    Pontine stroke was undated but considered chronic in 2017 brain imaging.    11/26/2017 Brain MRI - There are punctate regions of bright T2 signal in the periventricular white matter related to sequela of prior small vessel ischemia. There are also areas of increased signal in the basal ganglia and christy related to chronic lacunar infarctions. One involving the right christy does demonstrate chronic hemorrhage within seen on image 7 of series 6 and series 8.    Family has been concerned about Parkinsonism but had been told he had essential tremor.      Recent hospitalization for falls and falling asleep.  Multiple hospitalizations.  Concern over excessive sleepiness; had been falling during therapy at the hospital.  Was having trouble sleeping over night at hospital and reports similar troubles initially at the TCU.  Discharged on Monday to TCU ([facility]).    Had oximetry while hospitalized; oximetry showed 30 minutes < 89% with 133 desaturations in 3:26 hours.  Desaturations were fairly regular and may be consistent with Cheyne-Arguello.  During exam today he had waxing-waning consciousness with twitching concerning for Cheyne-Arguello pattern as well.    Mingo reports he's been having decreased wakefulness for about 6 months, but Alexandra has been noting that he's been sleepy for maybe years.    Patient is frustrated by nocturnal sleeping problems.      Allergies:    No Known Allergies    Medications:    Current Outpatient Prescriptions   Medication Sig Dispense Refill     modafinil (PROVIGIL) 100 MG tablet Take 1 tablet (100 mg) by mouth daily 30 tablet 3     aspirin EC 81 MG EC tablet Take 1 tablet (81 mg) by mouth daily 90 tablet 3     clotrimazole (LOTRIMIN) 1 % cream  Apply topically 2 times daily Apply to feet and between toes       clotrimazole-betamethasone (LOTRISONE) cream Apply topically 2 times daily 15 g 1     LOSARTAN POTASSIUM PO Take 50 mg by mouth daily       melatonin 1 MG TABS tablet Take 1 tablet (1 mg) by mouth At Bedtime       metoprolol tartrate (LOPRESSOR) 25 MG tablet Take 2 tablets (50 mg) by mouth 2 times daily 120 tablet 11     multivitamin, therapeutic with minerals (THERA-VIT-M) TABS tablet Take 1 tablet by mouth daily 30 each 0     sertraline (ZOLOFT) 50 MG tablet Take 1 tablet (50 mg) by mouth daily 90 tablet 1     TORSEMIDE PO Take 10 mg by mouth daily         Problem List:  Patient Active Problem List    Diagnosis Date Noted     Coronary artery disease involving native coronary artery of native heart 06/21/2018     Priority: Medium     Falls 06/13/2018     Priority: Medium     Fall 06/11/2018     Priority: Medium     Protein-calorie malnutrition (H) 06/05/2018     Priority: Medium     Long-term (current) use of anticoagulants [Z79.01] 01/16/2018     Priority: Medium     Anxiety 01/01/2018     Priority: Medium     added zoloft       CVA (cerebral vascular accident) (H) 11/25/2017     Priority: Medium     L MCA embolism, received TPA. No residual deficits, add coumadin       Cardiomyopathy (H) 11/01/2017     Priority: Medium     found on echo done during cvi in [facility]; echo 1/18 here with ef 38%, global hypokinesis       Intermittent atrial fibrillation (H) 12/06/2016     Priority: Medium     Hyperlipidemia LDL goal <100 04/16/2012     Priority: Medium     Pseudogout      Priority: Medium     Benign essential hypertension      Priority: Medium     ASCVD (arteriosclerotic cardiovascular disease)      Priority: Medium     angio for cp and 2 vessel dz, cabg done 2004, post op afib       CKD (chronic kidney disease) stage 4, GFR 15-29 ml/min (H)      Priority: Medium     PVD (peripheral vascular disease) with claudication (H)      Priority: Medium     "    Past Medical/Surgical History:  Past Medical History:   Diagnosis Date     afib 2004, 2010, 2012    post op and then again 2010, 2012     Anxiety 01/2018    added zoloft     ASCVD (arteriosclerotic cardiovascular disease) 2004    angio for cp and 2 vessel dz, cabg done 2004, post op afib     Cardiomyopathy (H) 11/2017    found on echo done during cvi in [facility]; echo 1/18 here with ef 38%, global hypokinesis     CKD (chronic kidney disease) stage 4, GFR 15-29 ml/min (H)      CVA (cerebral vascular accident) (H) 11/25/2017    L MCA embolism, received TPA. No residual deficits, add coumadin     Genital herpes      HTN (hypertension)      Hypercholesteremia      Pseudogout      PVD (peripheral vascular disease) with claudication (H)      Past Surgical History:   Procedure Laterality Date     CATARACT IOL, RT/LT  2006     CORONARY ARTERY BYPASS  2004     HYDROCELECTOMY SCROTAL  1997     TONSILLECTOMY       TURP  1997       Social History:  Social History     Social History     Marital status:      Spouse name: N/A     Number of children: 3     Years of education: N/A     Occupational History           Social History Main Topics     Smoking status: Former Smoker     Quit date: 4/16/1954     Smokeless tobacco: Never Used     Alcohol use No     Drug use: No     Sexual activity: Not Currently     Other Topics Concern     Not on file     Social History Narrative       Family History:  Family History   Problem Relation Age of Onset     Breast Cancer Sister          Physical Examination:  Vitals: /62  Pulse 56  Resp 16  Ht 1.753 m (5' 9.02\")  Wt 56.7 kg (125 lb)  SpO2 95%  BMI 18.45 kg/m2  BMI= Body mass index is 18.45 kg/(m^2).    Neck Cir (cm): 38 cm    Anson Total Score 6/22/2018   Total score - Anson 21     Waxing-waning respiration with trending consciousness.   Wheelchair dependent.  Slurred speech.    Impression/Plan:  1. Syncope, unspecified syncope type  Concern for " Cheyne-Arguello breathing with alteration of consciousness versus #6.  - SLEEP EVALUATION & MANAGEMENT REFERRAL - ADULT -[facility] - [place] [phone]  (Age 18 and up)    2. Circadian rhythm sleep disorder  Plan for 2 weeks of actigraphy to assess sleep and wake derangement.    - Comprehensive Sleep Study; Future    3. Cognitive impairment  4. Cheyne-Arguello breathing disorder  Demonstrated hypoxia during overnight oximetry in hospital without signs or symptoms of Obstructive Sleep Apnea.  Suspect heart-failure related CSPB.  Plan for overnight oximetry at home and then will need F2F to prescribe O2 if indicated.  - Overnight Oximeter    5. Ischemic cardiomyopathy  Concern for #4 related to this problem.  Has moderate hypokinesia combined with moderate to severe mitral regurg suggesting worse forward flow than EF would suggest.      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  Patient is currently too frail to undergo formal Multiple Sleep Latency Testing but I have no doubt his testing would reveal significant decrease in sleep latencies.    - modafinil (PROVIGIL) 100 MG tablet; Take 1 tablet (100 mg) by mouth daily  Dispense: 30 tablet; Refill: 3    Literature provided regarding sleep apnea, narcolepsy and circadian rhythm disorders.      He will follow up with me in approximately two weeks after his sleep study has been competed to review the results and discuss plan of care.       Polysomnography reviewed.  Obstructive sleep apnea reviewed.  Complications of untreated sleep apnea were reviewed.    Total time of care was 80 minutes, with > 50% of time spent on counseling and coordination of care.      Vivek No MD, Sleep Physician  Jun 22, 2018

## 2018-06-22 NOTE — NURSING NOTE
"Chief Complaint   Patient presents with     Sleep Problem       Initial /62  Pulse 56  Resp 16  Ht 1.753 m (5' 9.02\")  Wt 56.7 kg (125 lb)  SpO2 95%  BMI 18.45 kg/m2 Estimated body mass index is 18.45 kg/(m^2) as calculated from the following:    Height as of this encounter: 1.753 m (5' 9.02\").    Weight as of this encounter: 56.7 kg (125 lb).    Medication Reconciliation: complete    Neck circumference: 15 inches / 38 centimeters.    Ess 21    Bridgette Felton MA      "

## 2018-06-22 NOTE — PATIENT INSTRUCTIONS

## 2018-06-22 NOTE — MR AVS SNAPSHOT
After Visit Summary   6/22/2018    Bj Ponce    MRN: [id number]           Patient Information     Date Of Birth          10/6/1929        Visit Information        Provider Department      6/22/2018 1:00 PM BED 7  SLEEP [facility]        Today's Diagnoses     Cerebrovascular accident (CVA) due to embolism of middle cerebral artery, unspecified blood vessel laterality (H)    -  1       Follow-ups after your visit        Your next 10 appointments already scheduled     Jul 06, 2018 10:30 AM CDT   Actigraphy Drop Off with  SLEEP CENTER DME   [facility] ([facility])    [address]  [facility] [address]   [phone]            Jul 06, 2018  1:30 PM CDT   Return Sleep Patient with Vivek No MD   [facility] ([facility])    [address]  [place] [address]   [phone]            Jul 09, 2018 11:00 AM CDT   Office Visit with Azar Zaldivar MD   [facility]    [address] [address]   [phone]           Bring a current list of meds and any records pertaining to this visit. For Physicals, please bring immunization records and any forms needing to be filled out. Please arrive 10 minutes early to complete paperwork.            Jul 23, 2018 11:00 AM CDT   P EP RETURN with Carri Sherman PA-C   [facility] ([facility])    [address]  [place] [place] [facility] [place] [address] [phone] OPT 2            Aug 13, 2018  3:45 PM CDT   New Visit with Emeterio Coleman MD   [facility] ([facility])    [id number]  [facility] [place] [address] [phone] OPT 2              Future tests that were ordered for you today     Open Future Orders        Priority Expected  Expires Ordered    Overnight oximetry study Routine  12/19/2018 6/22/2018    Comprehensive Sleep Study Routine  12/19/2018 6/22/2018            Who to contact     If you have questions or need follow up information about today's clinic visit or your schedule please contact [facility] directly at [phone].  Normal or non-critical lab and imaging results will be communicated to you by MyChart, letter or phone within 4 business days after the clinic has received the results. If you do not hear from us within 7 days, please contact the clinic through MyChart or phone. If you have a critical or abnormal lab result, we will notify you by phone as soon as possible.  Submit refill requests through [employer] or call your pharmacy and they will forward the refill request to us. Please allow 3 business days for your refill to be completed.          Additional Information About Your Visit        Care EveryWhere ID     This is your Care EveryWhere ID. This could be used by other organizations to access your [place] medical records  [id number]         Blood Pressure from Last 3 Encounters:   06/22/18 136/62   06/21/18 134/68   06/18/18 154/76    Weight from Last 3 Encounters:   06/22/18 56.7 kg (125 lb)   06/21/18 56.7 kg (125 lb)   06/18/18 55.2 kg (121 lb 9.6 oz)              Today, you had the following     No orders found for display         Today's Medication Changes          These changes are accurate as of 6/22/18  4:06 PM.  If you have any questions, ask your nurse or doctor.               Start taking these medicines.        Dose/Directions    modafinil 100 MG tablet   Commonly known as:  PROVIGIL   Used for:  Narcolepsy without cataplexy(347.00)   Started by:  Vivek No MD        Dose:  100 mg   Take 1 tablet (100 mg) by mouth daily   Quantity:  30 tablet   Refills:  3            Where to get your medicines      Some of these will need a paper prescription and others can be bought  over the counter.  Ask your nurse if you have questions.     Bring a paper prescription for each of these medications     modafinil 100 MG tablet                Primary Care Provider Office Phone # Fax #    Azar Zaldivar [phone] [address]  [facility] [address]        Equal Access to Services     LALITHA MERA : Hadii aad ku hadasho Soomaali, waaxda luqadaha, qaybta kaalmada adeegyada, waxay idiin hayaan adeeg kharash la'aan . So [facility] [phone].    ATENCIÓN: Si habla español, tiene a clifford disposición servicios gratuitos de asistencia lingüística. Llame al [phone].    We comply with applicable federal civil rights laws and [place] laws. We do not discriminate on the basis of race, color, national origin, age, disability, sex, sexual orientation, or gender identity.            Thank you!     Thank you for choosing [facility]  for your care. Our goal is always to provide you with excellent care. Hearing back from our patients is one way we can continue to improve our services. Please take a few minutes to complete the written survey that you may receive in the mail after your visit with us. Thank you!             Your Updated Medication List - Protect others around you: Learn how to safely use, store and throw away your medicines at www.disposemymeds.org.          This list is accurate as of 6/22/18  4:06 PM.  Always use your most recent med list.                   Brand Name Dispense Instructions for use Diagnosis    aspirin 81 MG EC tablet     90 tablet    Take 1 tablet (81 mg) by mouth daily    Cerebrovascular accident (CVA), unspecified mechanism (H)       clotrimazole 1 % cream    LOTRIMIN     Apply topically 2 times daily Apply to feet and between toes    Cerebrovascular accident (CVA), unspecified mechanism (H)       clotrimazole-betamethasone cream    LOTRISONE    15 g    Apply topically 2 times daily        LOSARTAN POTASSIUM PO      Take 50  mg by mouth daily        melatonin 1 MG Tabs tablet      Take 1 tablet (1 mg) by mouth At Bedtime    Syncope, unspecified syncope type       metoprolol tartrate 25 MG tablet    LOPRESSOR    120 tablet    Take 2 tablets (50 mg) by mouth 2 times daily    Cerebrovascular accident (CVA), unspecified mechanism (H)       modafinil 100 MG tablet    PROVIGIL    30 tablet    Take 1 tablet (100 mg) by mouth daily    Narcolepsy without cataplexy(347.00)       multivitamin, therapeutic with minerals Tabs tablet     30 each    Take 1 tablet by mouth daily    Syncope, unspecified syncope type       sertraline 50 MG tablet    ZOLOFT    90 tablet    Take 1 tablet (50 mg) by mouth daily    Anxiety       TORSEMIDE PO      Take 10 mg by mouth daily

## 2018-06-22 NOTE — MR AVS SNAPSHOT
After Visit Summary   6/22/2018    Bj Ponce    MRN: [id number]           Patient Information     Date Of Birth          10/6/1929        Visit Information        Provider Department      6/22/2018 12:00 PM Vivek No MD [facility]        Today's Diagnoses     Circadian rhythm sleep disorder    -  1    Syncope, unspecified syncope type        Cognitive impairment        Cheyne-Arguello breathing disorder        Ischemic cardiomyopathy        Narcolepsy without cataplexy(347.00)        Excessive daytime sleepiness          Care Instructions      Your BMI is Body mass index is 18.45 kg/(m^2).  Weight management is a personal decision.  If you are interested in exploring weight loss strategies, the following discussion covers the approaches that may be successful. Body mass index (BMI) is one way to tell whether you are at a healthy weight, overweight, or obese. It measures your weight in relation to your height.  A BMI of 18.5 to 24.9 is in the healthy range. A person with a BMI of 25 to 29.9 is considered overweight, and someone with a BMI of 30 or greater is considered obese. More than two-thirds of American adults are considered overweight or obese.  Being overweight or obese increases the risk for further weight gain. Excess weight may lead to heart disease and diabetes.  Creating and following plans for healthy eating and physical activity may help you improve your health.  Weight control is part of healthy lifestyle and includes exercise, emotional health, and healthy eating habits. Careful eating habits lifelong are the mainstay of weight control. Though there are significant health benefits from weight loss, long-term weight loss with diet alone may be very difficult to achieve- studies show long-term success with dietary management in less than 10% of people. Attaining a healthy weight may be especially difficult to achieve in those with severe obesity. In  some cases, medications, devices and surgical management might be considered.  What can you do?  If you are overweight or obese and are interested in methods for weight loss, you should discuss this with your provider.     Consider reducing daily calorie intake by 500 calories.     Keep a food journal.     Avoiding skipping meals, consider cutting portions instead.    Diet combined with exercise helps maintain muscle while optimizing fat loss. Strength training is particularly important for building and maintaining muscle mass. Exercise helps reduce stress, increase energy, and improves fitness. Increasing exercise without diet control, however, may not burn enough calories to loose weight.       Start walking three days a week 10-20 minutes at a time    Work towards walking thirty minutes five days a week     Eventually, increase the speed of your walking for 1-2 minutes at time    In addition, we recommend that you review healthy lifestyles and methods for weight loss available through the National Institutes of Health patient information sites:  http://win.niddk.nih.gov/publications/index.htm    And look into health and wellness programs that may be available through your health insurance provider, employer, local community center, or [employer] club.    Weight management plan Patient was referred to their PCP to discuss a diet and exercise plan.              Follow-ups after your visit        Additional Services     Oxygen Therapy Referral       [place] [facility] [place] [phone]    [email or web address]    Click the [facility] Oxygen Form box (in the SmartSet) to get the [facility] Certificate of Necessity                  Your next 10 appointments already scheduled     Jul 06, 2018 10:30 AM CDT   Actigraphy Drop Off with  SLEEP CENTER DME   [facility])    [address] [address]    [phone]            Jul 06, 2018  1:30 PM CDT   Return Sleep Patient with Vivek No MD   [facility] ([facility])    [address]  [facility] [address] [phone]            Jul 09, 2018 11:00 AM CDT   Office Visit with Azar Zaldivar MD   [facility]    [address] [address] [phone]           Bring a current list of meds and any records pertaining to this visit. For Physicals, please bring immunization records and any forms needing to be filled out. Please arrive 10 minutes early to complete paperwork.            Jul 23, 2018 11:00 AM CDT   P EP RETURN with Carri Sherman PA-C   [facility] ([facility])    [address]  [facility] [address] [phone] OPT 2            Aug 13, 2018  3:45 PM CDT   New Visit with Emeterio Coleman MD   [facility] ([facility])    [address]  [facility] [id number] [phone] OPT 2              Future tests that were ordered for you today     Open Future Orders        Priority Expected Expires Ordered    Overnight oximetry study Routine  12/19/2018 6/22/2018    Comprehensive Sleep Study Routine  12/19/2018 6/22/2018            Who to contact     If you have questions or need follow up information about today's clinic visit or your schedule please contact [facility] directly at [phone].  Normal or non-critical lab and imaging results will be communicated to you by MyChart, letter or phone within 4 business days after the clinic has received the results. If you do not hear from us within 7 days, please contact the clinic through MyChart or phone. If you have a critical or abnormal lab result, we will notify you by phone as soon as possible.  Submit refill  "requests through [employer] or call your pharmacy and they will forward the refill request to us. Please allow 3 business days for your refill to be completed.          Additional Information About Your Visit        Care EveryWhere ID     This is your Care EveryWhere ID. This could be used by other organizations to access your [place] medical records  [id number]        Your Vitals Were     Pulse Respirations Height Pulse Oximetry BMI (Body Mass Index)       56 16 1.753 m (5' 9.02\") 95% 18.45 kg/m2        Blood Pressure from Last 3 Encounters:   06/22/18 136/62   06/21/18 134/68   06/18/18 154/76    Weight from Last 3 Encounters:   06/22/18 56.7 kg (125 lb)   06/21/18 56.7 kg (125 lb)   06/18/18 55.2 kg (121 lb 9.6 oz)              We Performed the Following     Oxygen Therapy Referral     SLEEP EVALUATION & MANAGEMENT REFERRAL - [facility] [phone]  (Age 18 and up)          Today's Medication Changes          These changes are accurate as of 6/22/18  1:45 PM.  If you have any questions, ask your nurse or doctor.               Start taking these medicines.        Dose/Directions    modafinil 100 MG tablet   Commonly known as:  PROVIGIL   Used for:  Narcolepsy without cataplexy(347.00)   Started by:  Vivek No MD        Dose:  100 mg   Take 1 tablet (100 mg) by mouth daily   Quantity:  30 tablet   Refills:  3            Where to get your medicines      Some of these will need a paper prescription and others can be bought over the counter.  Ask your nurse if you have questions.     Bring a paper prescription for each of these medications     modafinil 100 MG tablet                Primary Care Provider Office Phone # Fax #    Azar Zaldivar [phone] [address] [address]        Equal Access to Services     LALITHA MERA AH: Hadii adarsh garnica hadasho Soomaali, waaxda luqadaha, qaybta kaalmada adeegyada, rc cerda " michael hinkle'aan ah. So [facility] [phone].    ATENCIÓN: Si poli chi, tiene a clifford disposición servicios gratuitos de asistencia lingüística. Kenny valdivia [phone].    We comply with applicable federal civil rights laws and [place] laws. We do not discriminate on the basis of race, color, national origin, age, disability, sex, sexual orientation, or gender identity.            Thank you!     Thank you for choosing [facility]  for your care. Our goal is always to provide you with excellent care. Hearing back from our patients is one way we can continue to improve our services. Please take a few minutes to complete the written survey that you may receive in the mail after your visit with us. Thank you!             Your Updated Medication List - Protect others around you: Learn how to safely use, store and throw away your medicines at www.disposemymeds.org.          This list is accurate as of 6/22/18  1:45 PM.  Always use your most recent med list.                   Brand Name Dispense Instructions for use Diagnosis    aspirin 81 MG EC tablet     90 tablet    Take 1 tablet (81 mg) by mouth daily    Cerebrovascular accident (CVA), unspecified mechanism (H)       clotrimazole 1 % cream    LOTRIMIN     Apply topically 2 times daily Apply to feet and between toes    Cerebrovascular accident (CVA), unspecified mechanism (H)       clotrimazole-betamethasone cream    LOTRISONE    15 g    Apply topically 2 times daily        LOSARTAN POTASSIUM PO      Take 50 mg by mouth daily        melatonin 1 MG Tabs tablet      Take 1 tablet (1 mg) by mouth At Bedtime    Syncope, unspecified syncope type       metoprolol tartrate 25 MG tablet    LOPRESSOR    120 tablet    Take 2 tablets (50 mg) by mouth 2 times daily    Cerebrovascular accident (CVA), unspecified mechanism (H)       modafinil 100 MG tablet    PROVIGIL    30 tablet    Take 1 tablet (100 mg) by mouth daily    Narcolepsy without cataplexy(347.00)        multivitamin, therapeutic with minerals Tabs tablet     30 each    Take 1 tablet by mouth daily    Syncope, unspecified syncope type       sertraline 50 MG tablet    ZOLOFT    90 tablet    Take 1 tablet (50 mg) by mouth daily    Anxiety       TORSEMIDE PO      Take 10 mg by mouth daily

## 2018-06-22 NOTE — PROGRESS NOTES
Actigraphy . Patient demonstrated and verbalized knowledge of use. Patient given sleep log and will fill out accordingly for the next 2 weeks.      Trinity Penaloza  [facility] Clinic - Specialist

## 2018-07-06 NOTE — TELEPHONE ENCOUNTER
Called Xuan, she states Jayden is doing fine and doesn't have any concerns.    Appt on 7/9 cancelled.    Juancho GILBERT RN

## 2018-07-06 NOTE — TELEPHONE ENCOUNTER
Next 5 appointments (look out 90 days)     Jul 09, 2018 11:00 AM CDT   [facility]   [facility]    [address] [facility] [id number] [phone]                Per below patient is being followed by providers at [facility] currently   Please advise if okay to cancel upcoming appointment on 7/9/18 with Dr. Zaldivar?     Janice NORRIS RN

## 2018-07-06 NOTE — TELEPHONE ENCOUNTER
If pt does not have any concerns and he is getting the care he needs, then ok to cancel appt with Dr Zaldivar.

## 2018-07-06 NOTE — TELEPHONE ENCOUNTER
Reason for Call:  Other appointment and call back    Detailed comments: Kapil called asking if it was necessary for Jayden to have his appointment on 7/9 as he is currently at [facility] and being seen by multiple other providers currently.      Phone Number Patient can be reached at: Home number on file [phone] (home)    Best Time: anytime     Can we leave a detailed message on this number? YES    Call taken on 7/6/2018 at 8:59 AM by Caitlin Barlow

## 2018-07-06 NOTE — PROGRESS NOTES
F/u multiple sleep concerns.  1. Hypersomnia/ excessive daytime sleepiness   2. Possible Central Sleep Apnea /Cheyne-Arguello Breathing Disorder    2 week actigraphy download:  6/22/2018 - 7/6/2018  Gain had to be increased significantly above normal (250 - 0 activity scale versus typical 1000 - 2000 max activity).  4 extended periods of off-wrist time.  No discernable day-night rhythm on actigraphy    Per [place], patient was confused on Modafinil 100 mg.  They stopped the medication then restarted and decreased dose back to 50 mg.    Discussed with NP at [facility].  Patient was more awake and alert on 100 mg but confusion was concerning.  50 mg not helping as much.    Some concern on acti with light exposure at night.  Patient denied knowledge but on discussion with [facility] NP he gets up to go watch TV at night.      Overnight oximetry on room air showed central sleep apnea pattern with oscillatory desaturations.  Recommend continuing nocturnal oxygen supplementation.    Plan to repeat oximetry on 2 LPM.  Avoid overnight light exposure and try to cut out electronics between 11 PM - 7 AM but could get exposure in the evenings.  No other good medication options.  PAP Therapy unlikely to be tolerated.  Consider bed alarm.    Total time of care was 60 minutes, with > 50% of time spent on counseling and coordination of care.      Vivek No MD, Sleep Physician  Jul 6, 2018

## 2018-07-06 NOTE — MR AVS SNAPSHOT
After Visit Summary   7/6/2018    Bj Ponce    MRN: [id number]           Patient Information     Date Of Birth          10/6/1929        Visit Information        Provider Department      7/6/2018 1:30 PM Vivek No MD [facility] Instructions      Your BMI is Body mass index is 18.46 kg/(m^2).  Weight management is a personal decision.  If you are interested in exploring weight loss strategies, the following discussion covers the approaches that may be successful. Body mass index (BMI) is one way to tell whether you are at a healthy weight, overweight, or obese. It measures your weight in relation to your height.  A BMI of 18.5 to 24.9 is in the healthy range. A person with a BMI of 25 to 29.9 is considered overweight, and someone with a BMI of 30 or greater is considered obese. More than two-thirds of American adults are considered overweight or obese.  Being overweight or obese increases the risk for further weight gain. Excess weight may lead to heart disease and diabetes.  Creating and following plans for healthy eating and physical activity may help you improve your health.  Weight control is part of healthy lifestyle and includes exercise, emotional health, and healthy eating habits. Careful eating habits lifelong are the mainstay of weight control. Though there are significant health benefits from weight loss, long-term weight loss with diet alone may be very difficult to achieve- studies show long-term success with dietary management in less than 10% of people. Attaining a healthy weight may be especially difficult to achieve in those with severe obesity. In some cases, medications, devices and surgical management might be considered.  What can you do?  If you are overweight or obese and are interested in methods for weight loss, you should discuss this with your provider.     Consider reducing daily calorie intake by 500 calories.     Keep a  food journal.     Avoiding skipping meals, consider cutting portions instead.    Diet combined with exercise helps maintain muscle while optimizing fat loss. Strength training is particularly important for building and maintaining muscle mass. Exercise helps reduce stress, increase energy, and improves fitness. Increasing exercise without diet control, however, may not burn enough calories to loose weight.       Start walking three days a week 10-20 minutes at a time    Work towards walking thirty minutes five days a week     Eventually, increase the speed of your walking for 1-2 minutes at time    In addition, we recommend that you review healthy lifestyles and methods for weight loss available through the National Institutes of Health patient information sites:  http://win.niddk.nih.gov/publications/index.htm    And look into health and wellness programs that may be available through your health insurance provider, employer, local community center, or [employer].    Weight management plan Patient was referred to their PCP to discuss a diet and exercise plan.            Follow-ups after your visit        Your next 10 appointments already scheduled     Jul 23, 2018 11:00 AM CDT   [facility] EP RETURN with Carri Sherman PA-C   [facility])    [address] [place] [id number] [phone] OPT 2            Aug 13, 2018  3:45 PM CDT   New Visit with Emeterio Coleman MD   [facility] ([facility])    [address] [place] [address] [id number] [phone] OPT 2              Who to contact     If you have questions or need follow up information about today's clinic visit or your schedule please contact [facility] directly at [phone].  Normal or non-critical lab and imaging results will be communicated to you by MyChart, letter or phone within 4 business  "days after the clinic has received the results. If you do not hear from us within 7 days, please contact the clinic through [employer] or phone. If you have a critical or abnormal lab result, we will notify you by phone as soon as possible.  Submit refill requests through [employer] or call your pharmacy and they will forward the refill request to us. Please allow 3 business days for your refill to be completed.          Additional Information About Your Visit        Care EveryWhere ID     This is your Care EveryWhere ID. This could be used by other organizations to access your [place] medical records  [id number]        Your Vitals Were     Pulse Respirations Height Pulse Oximetry BMI (Body Mass Index)       62 16 1.753 m (5' 9\") 92% 18.46 kg/m2        Blood Pressure from Last 3 Encounters:   07/06/18 129/64   06/22/18 136/62   06/21/18 134/68    Weight from Last 3 Encounters:   07/06/18 56.7 kg (125 lb)   06/22/18 56.7 kg (125 lb)   06/21/18 56.7 kg (125 lb)              Today, you had the following     No orders found for display         Today's Medication Changes          These changes are accurate as of 7/6/18  2:57 PM.  If you have any questions, ask your nurse or doctor.               These medicines have changed or have updated prescriptions.        Dose/Directions    modafinil 100 MG tablet   Commonly known as:  PROVIGIL   This may have changed:  how much to take   Used for:  Narcolepsy without cataplexy(347.00)        Dose:  100 mg   Take 1 tablet (100 mg) by mouth daily   Quantity:  30 tablet   Refills:  3       sertraline 50 MG tablet   Commonly known as:  ZOLOFT   This may have changed:  how much to take   Used for:  Anxiety        Dose:  50 mg   Take 1 tablet (50 mg) by mouth daily   Quantity:  90 tablet   Refills:  1                Primary Care Provider Office Phone # Fax #    Azar Zaldivar [phone] [address] [place] [address] [place] [id number]        Equal Access to Services  "    [facility] CenterSTEFANIE : Hadii aad ku ken Reinoso, waaxda luqadaha, qaybta kaalmada adeallison, rc billie urmilaalireza ellisrehanluis skelton . So [facility] [phone].    ATENCIÓN: Si habla español, tiene a clifford disposición servicios gratuitos de asistencia lingüística. Lauraame al [phone].    We comply with applicable federal civil rights laws and [place] laws. We do not discriminate on the basis of race, color, national origin, age, disability, sex, sexual orientation, or gender identity.            Thank you!     Thank you for choosing [facility]  for your care. Our goal is always to provide you with excellent care. Hearing back from our patients is one way we can continue to improve our services. Please take a few minutes to complete the written survey that you may receive in the mail after your visit with us. Thank you!             Your Updated Medication List - Protect others around you: Learn how to safely use, store and throw away your medicines at www.disposemymeds.org.          This list is accurate as of 7/6/18  2:57 PM.  Always use your most recent med list.                   Brand Name Dispense Instructions for use Diagnosis    aspirin 81 MG EC tablet     90 tablet    Take 1 tablet (81 mg) by mouth daily    Cerebrovascular accident (CVA), unspecified mechanism (H)       clotrimazole 1 % cream    LOTRIMIN     Apply topically 2 times daily Apply to feet and between toes    Cerebrovascular accident (CVA), unspecified mechanism (H)       clotrimazole-betamethasone cream    LOTRISONE    15 g    Apply topically 2 times daily        LOSARTAN POTASSIUM PO      Take 50 mg by mouth daily        melatonin 1 MG Tabs tablet      Take 1 tablet (1 mg) by mouth At Bedtime    Syncope, unspecified syncope type       metoprolol tartrate 25 MG tablet    LOPRESSOR    120 tablet    Take 2 tablets (50 mg) by mouth 2 times daily    Cerebrovascular accident (CVA), unspecified mechanism (H)       modafinil 100 MG  tablet    PROVIGIL    30 tablet    Take 1 tablet (100 mg) by mouth daily    Narcolepsy without cataplexy(347.00)       multivitamin, therapeutic with minerals Tabs tablet     30 each    Take 1 tablet by mouth daily    Syncope, unspecified syncope type       sertraline 50 MG tablet    ZOLOFT    90 tablet    Take 1 tablet (50 mg) by mouth daily    Anxiety       TORSEMIDE PO      Take 10 mg by mouth daily

## 2018-07-06 NOTE — PATIENT INSTRUCTIONS

## 2018-07-06 NOTE — NURSING NOTE
"Chief Complaint   Patient presents with     Study Results     Actigraphy watch f/u       Initial /64  Pulse 62  Resp 16  Ht 1.753 m (5' 9\")  Wt 56.7 kg (125 lb)  SpO2 92%  BMI 18.46 kg/m2 Estimated body mass index is 18.46 kg/(m^2) as calculated from the following:    Height as of this encounter: 1.753 m (5' 9\").    Weight as of this encounter: 56.7 kg (125 lb).    Medication Reconciliation: complete     ESS   Brooklyn Napoles        "

## 2018-07-06 NOTE — TELEPHONE ENCOUNTER
Xuan called and said to call her at # [phone] (or) # [phone]  Do not call the number in 1st message    Thank you

## 2018-07-11 NOTE — TELEPHONE ENCOUNTER
"warfarin (COUMADIN) 2.5 MG tablet (Discontinued) 180 tablet 0 4/25/2018     Discontinued    Last Written Prescription Date:  4/25/18  Last Fill Quantity: 180,  # refills: 0   Last office visit: 6/11/2018 with prescribing provider:  Kayley   Future Office Visit:  none    Requested Prescriptions   Pending Prescriptions Disp Refills     warfarin (COUMADIN) 2.5 MG tablet [Pharmacy Med Name: WARFARIN SODIUM 2.5 MG TABLET] 180 tablet 0     Sig: TAKE 2-3 TABLETS DAILY (5-7.5 MG) BY MOUTH DAILY OR AS INSTRUCTED BY INR CLINIC    Vitamin K Antagonists Failed    7/11/2018  3:52 AM       Failed - INR is within goal in the past 6 weeks    Confirm INR is within goal in the past 6 weeks.     Recent Labs   Lab Test  06/11/18   1745   INR  1.20*                      Passed - Recent (12 mo) or future (30 days) visit within the authorizing provider's specialty    Patient had office visit in the last 12 months or has a visit in the next 30 days with authorizing provider or within the authorizing provider's specialty.  See \"Patient Info\" tab in inbasket, or \"Choose Columns\" in Meds & Orders section of the refill encounter.           Passed - Patient is 18 years of age or older        No flowsheet data found.  "

## 2018-07-11 NOTE — TELEPHONE ENCOUNTER
Just to verify patient is off of Warfarin correct??    Please advise.  Tish Crisostomo RN      CAD, ischemic cardiomyopathy  High CHADS-VASC score however not anticoagulation candidate secondary to frequent falls. EP following him and planning to follow-up in the clinic to discuss possible watchman procedure. Echo 6/12 with EF 30-40% with WMA. Currently A. fib with controlled ventricular rate. Noted abnormal stress test and given his chronic kidney disease frequent falls and other medical comorbidities cardiology recommending medical management at this point.    - Continue torsemide, Lopressor and losartan, aspirin .  Statin discontinued for his elevated LFTs..

## 2018-07-11 NOTE — TELEPHONE ENCOUNTER
Left message asking patient to call back to verify if taking med  (see routing comments from PCP)    Janice NORRIS RN

## 2018-07-24 NOTE — TELEPHONE ENCOUNTER
Tried to call pt but the number would not go through. Called pt's son Kris (approved on consent to communicate 6/20/18) to call us back.

## 2018-07-24 NOTE — TELEPHONE ENCOUNTER
PCP see below, please advise if ok to give verbal on below hospice orders or if facility provider should follow per below    Janice NORRIS RN

## 2018-07-24 NOTE — TELEPHONE ENCOUNTER
I think this patient will be better served by having a doctor that can come out to his facility, so I would rec that.    Thanks    Azar Zaldivar M.D.

## 2018-07-24 NOTE — TELEPHONE ENCOUNTER
Reason for Call: Request for an order or referral:    Order or referral being requested: Order for admission to assisted living under hospice from TCU    Date needed: as soon as possible    Has the patient been seen by the PCP for this problem? Not Applicable    Additional comments: Do you want to follow and take hospice or should transfer to local PCP that comes to assisted living facility.  [facility] is faxing over discharge summary.    Phone number Patient can be reached at:  Other phone number:  [facility] [phone]    Best Time:  any    Can we leave a detailed message on this number?  YES    Call taken on 7/24/2018 at 4:29 PM by Kathy Rader

## 2018-07-27 NOTE — TELEPHONE ENCOUNTER
Another request from pharmacy.  See TE 7/24/18 - patient may be going into hospice care and transitioning to care with provider at facility.  Need to try family again (C2C on file for several)    RT George (R)

## 2018-07-31 NOTE — TELEPHONE ENCOUNTER
Called Alexandra (daughter of patient, on C2C).   States patient is no longer taking warfarin   1) med already has been stopped from med list  2) verified pt has been removed pt from list ([facility])  3) resolved INR episode  4) denial sent to pharmacy   Janice NORRIS RN

## 2021-08-02 NOTE — IP AVS SNAPSHOT
MRN:[id number]                      After Visit Summary   6/11/2018    Bj Ponce    MRN: [id number]           Thank you!     Thank you for choosing [facility] for your care. Our goal is always to provide you with excellent care. Hearing back from our patients is one way we can continue to improve our services. Please take a few minutes to complete the written survey that you may receive in the mail after you visit with us. Thank you!        Patient Information     Date Of Birth          10/6/1929        Designated Caregiver       Most Recent Value    Caregiver    Will someone help with your care after discharge? yes    Name of designated caregiver Mingo    Phone number of caregiver [phone]    Caregiver address Unknown      About your hospital stay     You were admitted on:  June 11, 2018 You last received care in the:  [place] [address] [facility]    You were discharged on:  June 18, 2018        Reason for your hospital stay       You were hospitalized for falls, and falling asleep recurrently.                  Who to Call     For medical emergencies, please call 911.  For non-urgent questions about your medical care, please call your primary care provider or clinic, [phone]          Attending Provider     Provider Specialty    Gerardo Paul MD Internal Medicine    Jonathan Dove DO Internal Medicine       Primary Care Provider Office Phone # Fax #    Azar Zaldivar [phone]      After Care Instructions     Activity - Up with nursing assistance           Additional Discharge Instructions       Patient is a fall risk given his sleeping spells, and should not be left unattended when standing, walking, or in the bathroom.            Advance Diet as Tolerated       Follow this diet upon discharge: Orders Placed This Encounter      Room Service      Regular Diet Adult            Fall precautions           General info for SNF       Length of Stay  Estimate: Short Term Care: Estimated # of Days <30  Condition at Discharge: Improving  Level of care:skilled   Rehabilitation Potential: Good  Admission H&P remains valid and up-to-date: Yes  Recent Chemotherapy: N/A  Use Nursing Home Standing Orders: Yes            Mantoux instructions       Give two-step Mantoux (PPD) Per Facility Policy Yes                  Follow-up Appointments     Follow-up and recommended labs and tests        Please arrive 30 minutes early to your appointment with Dr. No, the sleep specialist.                  Your next 10 appointments already scheduled     Jun 21, 2018 12:30 PM CDT   Return Discharge with AGUSTÍN Landeros CNP   [facility] ([facility])    [id number]  [facility] [address] [phone] OPT 2            Jul 05, 2018  1:30 PM CDT   New Sleep Patient with Vivek No MD   [facility] ([facility])    [address]  [facility] [id number] [phone]            Jul 09, 2018 11:00 AM CDT   [facility]    [address] [id number] [phone]           Bring a current list of meds and any records pertaining to this visit. For Physicals, please bring immunization records and any forms needing to be filled out. Please arrive 10 minutes early to complete paperwork.            Jul 23, 2018 11:00 AM CDT   [facility] EP RETURN with Carri Sherman PA-C   [facility] ([facility])    [id number]  [facility] [id number] [phone] OPT 2            Aug 13, 2018  3:45 PM CDT   New Visit with Emeterio Coleman MD   [facility] ([facility])    [id number]  [facility] [id number] [phone] OPT 2               Additional Services     Occupational Therapy Adult Consult       Evaluate and treat as clinically indicated.    Reason:  Deconditioning            Physical Therapy Adult Consult       Evaluate and treat as clinically indicated.    Reason:  Deconditioning            SLE[place] EVALUATION & MANAG[place] [phone]  (Age 18 and up)       Please be aware that coverage of these services is subject to the terms and limitations of your health insurance plan.  Call member services at your health plan with any benefit or coverage questions.      Please bring the following to your appointment:    >>   List of current medications   >>   This referral request   >>   Any documents/labs given to you for this referral                Follow-Up with Cardiac Advanced Practice Provider                 Future tests that were ordered for you     Cardiac event monitor                 Further instructions from your care team       Wallet;clothing;glasses;shoes;walker    Mingo took wallet and keys home    Pending Results     No orders found from 6/9/2018 to 6/12/2018.            Statement of Approval     Ordered          06/18/18 1419  I have reviewed and agree with all the recommendations and orders detailed in this document.  EFFECTIVE NOW     Approved and electronically signed by:  Pa Upton MD           06/18/18 1430  I have reviewed and agree with all the recommendations and orders detailed in this document.     Approved and electronically signed by:  Pa Upton MD             Admission Information     Date & Time Provider Department Dept. Phone    6/11/2018 Jonathan Dove DO [facility] Medical Specialty Unit [phone]      Your Vitals Were     Blood Pressure Pulse Temperature Respirations Weight Pulse Oximetry    154/76 (BP Location: Left arm) 64 97.3  F (36.3  C) (Axillary) 16 55.2 kg (121 lb 9.6 oz) 93%    BMI (Body Mass Index)                 "   17.96 kg/m2           [facility] Information     [facility] lets you send messages to your doctor, view your test results, renew your prescriptions, schedule appointments and more. To sign up, go to [email or web address] . Click on \"Log in\" on the left side of the screen, which will take you to the Welcome page. Then click on \"Sign up Now\" on the right side of the page.     You will be asked to enter the access code listed below, as well as some personal information. Please follow the directions to create your username and password.     Your access code is: [email or web address]  Expires: 2018  1:08 PM     Your access code will  in 90 days. If you need help or a new code, please call your [facility] clinic or [phone].        Care EveryWhere ID     This is your Care EveryWhere ID. This could be used by other organizations to access your [facility] medical records  [id number]        Equal Access to Services     LALITHA MERA : Hadii aad ku hadasho Sokatyali, waaxda luqadaha, qaybta kaalmada adeegyanathen, rc skelton . So [place] [phone].    ATENCIÓN: Si habla español, tiene a clifford disposición servicios gratuitos de asistencia lingüística. Llame al [phone].    We comply with applicable federal civil rights laws and [place] laws. We do not discriminate on the basis of race, color, national origin, age, disability, sex, sexual orientation, or gender identity.               Review of your medicines      START taking        Dose / Directions    melatonin 1 MG Tabs tablet   Used for:  Syncope, unspecified syncope type        Dose:  1 mg   Take 1 tablet (1 mg) by mouth At Bedtime   Refills:  0       multivitamin, therapeutic with minerals Tabs tablet   Used for:  Syncope, unspecified syncope type        Dose:  1 tablet   Start taking on:  2018   Take 1 tablet by mouth daily   Quantity:  30 each   Refills:  0         CONTINUE these medicines which have NOT CHANGED        Dose / " Directions    aspirin 81 MG EC tablet   Used for:  Cerebrovascular accident (CVA), unspecified mechanism (H)        Dose:  81 mg   Take 1 tablet (81 mg) by mouth daily   Quantity:  90 tablet   Refills:  3       clotrimazole 1 % cream   Commonly known as:  LOTRIMIN   Used for:  Cerebrovascular accident (CVA), unspecified mechanism (H)        Apply topically 2 times daily Apply to feet and between toes   Refills:  0       clotrimazole-betamethasone cream   Commonly known as:  LOTRISONE        Apply topically 2 times daily   Quantity:  15 g   Refills:  1       LOSARTAN POTASSIUM PO        Dose:  50 mg   Take 50 mg by mouth daily   Refills:  0       metoprolol tartrate 25 MG tablet   Commonly known as:  LOPRESSOR   Used for:  Cerebrovascular accident (CVA), unspecified mechanism (H)        Dose:  50 mg   Take 2 tablets (50 mg) by mouth 2 times daily   Quantity:  120 tablet   Refills:  11       sertraline 50 MG tablet   Commonly known as:  ZOLOFT   Used for:  Anxiety        Dose:  50 mg   Take 1 tablet (50 mg) by mouth daily   Quantity:  90 tablet   Refills:  1       TORSEMIDE PO        Dose:  10 mg   Take 10 mg by mouth daily   Refills:  0            Where to get your medicines      Some of these will need a paper prescription and others can be bought over the counter. Ask your nurse if you have questions.     You don't need a prescription for these medications     melatonin 1 MG Tabs tablet    multivitamin, therapeutic with minerals Tabs tablet                Protect others around you: Learn how to safely use, store and throw away your medicines at www.disposemymeds.org.             Medication List: This is a list of all your medications and when to take them. Check marks below indicate your daily home schedule. Keep this list as a reference.      Medications           Morning Afternoon Evening Bedtime As Needed    aspirin 81 MG EC tablet   Take 1 tablet (81 mg) by mouth daily   Last time this was given:  81 mg on  6/18/2018  8:31 AM   Next Dose Due:  6/19/18 in AM            6/19/18                       clotrimazole 1 % cream   Commonly known as:  LOTRIMIN   Apply topically 2 times daily Apply to feet and between toes                                clotrimazole-betamethasone cream   Commonly known as:  LOTRISONE   Apply topically 2 times daily                                LOSARTAN POTASSIUM PO   Take 50 mg by mouth daily   Last time this was given:  50 mg on 6/18/2018  8:30 AM   Next Dose Due:  6/19/18 AM                                   melatonin 1 MG Tabs tablet   Take 1 tablet (1 mg) by mouth At Bedtime   Last time this was given:  1 mg on 6/17/2018 11:36 PM   Next Dose Due:  6/18/18 at bedtime                                   metoprolol tartrate 25 MG tablet   Commonly known as:  LOPRESSOR   Take 2 tablets (50 mg) by mouth 2 times daily   Last time this was given:  50 mg on 6/18/2018  8:31 AM   Next Dose Due:  6/18/18 in the evening                                   multivitamin, therapeutic with minerals Tabs tablet   Take 1 tablet by mouth daily   Start taking on:  6/19/2018   Last time this was given:  1 tablet on 6/18/2018  8:31 AM                                sertraline 50 MG tablet   Commonly known as:  ZOLOFT   Take 1 tablet (50 mg) by mouth daily   Last time this was given:  50 mg on 6/18/2018  8:31 AM   Next Dose Due:  6/19/18 AM                                   TORSEMIDE PO   Take 10 mg by mouth daily   Last time this was given:  10 mg on 6/18/2018  8:30 AM   Next Dose Due:  6/19/18 AM                                      supine (semi) fowlers

## 2022-03-20 NOTE — PROGRESS NOTES
"Biotel heart monitor - \"Patient has chronic AF. Monitor placed to assess for pauses or severe pedrito that may be contributing to his increase in falls and \"syncopal type spells\". \"    Initial strips show AF. Folder started. SK  "
Yes

## 2023-07-09 NOTE — CONSULTS
"Consult Date:  06/15/2018      NEUROPSYCHOLOGICAL EVALUATION       ATTENDING PHYSICIAN: Dr. Paul       CONSULTANT:  Barry Sullivan, PhD, LP       IDENTIFYING INFORMATION AND REASON FOR EVALUATION:  This is an 88-year-old right-handed  gentleman with history of hypertension, hyperlipidemia, chronic kidney disease, coronary disease status post coronary artery bypass grafting in 2004, chronic atrial fibrillation and recent CVA 11/2017.  He presents with somnolence, and frequent falls.  He was admitted to the hospital 06/11/2018 with frequent falls and failure to thrive.      CT scan and MRI of the brain revealed loss of cerebral volume and periventricular white matter disease.      I was asked to provide neuropsychological testing.  The patient was interviewed and an abbreviated testing was attempted 06/15/2018 with results as follows:      On examination, the patient was pleasant and cooperative.  He is an 88-year-old  gentleman who appeared his stated age.  Speech was fluent.  Affect was appropriate.  The patient attempted to be cooperative with testing, but was extremely somnolent and I observed him nodding off and then spontaneously drifting into slumber about 6-8 times over the course of a very abbreviated neuropsychological evaluation.  Accordingly, I felt that further neuropsychological testing would not be valid and terminated evaluation.      Within broad parameters on testing that was administered, his performance on the Mini-Mental Status Exam was intact at a raw score of 30/30.  He showed no difficulty with orientation or registration of new information and subsequent recall.  He could concentrate and accurately spell the word \"world\" backwards.  He showed no aphasia or obvious apraxia.  His writing tended to be a bit micrographic.      On the draw a clock test, his performance was also affected given that by some elements micrographia, but he was able to correctly again within brought " parameters reproduce the face of the clock with correct placement of hands.  He was unable to perform the Dale-Osterrieth Complex Figure Test, however, and that measures performance deteriorated as he drifted off into sleep.  He showed a very serial and disorganized approach to this moderately complex design task.      Based on my evaluation, high probability of at least mild neurocognitive disorder which given history may well be vascular in nature.  In actuality though I am not able to perform a full neuropsychological evaluation at this juncture secondary to the patient being extremely drowsy and not being able to stay awake for more than a few moments at that time.      I would do further exploration of his underlying causes for his drowsiness, exploration of ways to keep him safe within his home environment or another structured living environment recommended and he remains a risk and vulnerable for continued difficulties secondary to his obvious drowsiness.  Beyond difficulties related to this, it would appear his cognitive functions are adequate for his current living situation.  If further assessment is indicated, I would recommend a home occupational therapy evaluation to address his functionality and safety within his home environment.  Occupational therapy might also be able to comment meaningfully on options or alternatives to keep patient safe within an environment and prevent or minimize falls if no further medical options to treat underlying conditions impacting this are not forthcoming.      DIAGNOSTIC IMPRESSIONS:  Mild neurocognitive disorder, unspecified etiology, rule out vascular, rule out other sources.      Thank you for this kind referral.         DOROTHY LOGAN, PHD, LP             D: 06/15/2018   T: 06/15/2018   MT: SHAYAN      Name:     DEMI BAILEY   MRN:      [id number]        Account:       [id number]   :      10/06/1929           Consult Date:  06/15/2018      Document: [id number]        cc: Gerardo Sullivan PhD, LP       Benjamin Zaldivar MD    - - -

## (undated) RX ORDER — REGADENOSON 0.08 MG/ML
INJECTION, SOLUTION INTRAVENOUS
Status: DISPENSED
Start: 2018-01-01